# Patient Record
Sex: FEMALE | Race: WHITE | NOT HISPANIC OR LATINO | Employment: OTHER | URBAN - METROPOLITAN AREA
[De-identification: names, ages, dates, MRNs, and addresses within clinical notes are randomized per-mention and may not be internally consistent; named-entity substitution may affect disease eponyms.]

---

## 2017-08-15 ENCOUNTER — APPOINTMENT (OUTPATIENT)
Dept: PREADMISSION TESTING | Facility: HOSPITAL | Age: 57
End: 2017-08-15

## 2017-08-15 ENCOUNTER — HOSPITAL ENCOUNTER (OUTPATIENT)
Dept: GENERAL RADIOLOGY | Facility: HOSPITAL | Age: 57
Discharge: HOME | End: 2017-08-15
Admitting: ORTHOPAEDIC SURGERY

## 2017-08-15 ENCOUNTER — HOSPITAL ENCOUNTER (OUTPATIENT)
Dept: GENERAL RADIOLOGY | Facility: HOSPITAL | Age: 57
Discharge: HOME | End: 2017-08-15

## 2017-08-15 LAB
ABO GROUP BLD: NORMAL
ALBUMIN SERPL-MCNC: 4 G/DL (ref 3.5–5.2)
ALBUMIN/GLOB SERPL: 1.2 G/DL
ALP SERPL-CCNC: 74 U/L (ref 39–117)
ALT SERPL W P-5'-P-CCNC: 18 U/L (ref 1–33)
ANION GAP SERPL CALCULATED.3IONS-SCNC: 14.3 MMOL/L
AST SERPL-CCNC: 18 U/L (ref 1–32)
BILIRUB SERPL-MCNC: 0.2 MG/DL (ref 0.1–1.2)
BILIRUB UR QL STRIP: NEGATIVE
BLD GP AB SCN SERPL QL: NEGATIVE
BUN BLD-MCNC: 16 MG/DL (ref 6–20)
BUN/CREAT SERPL: 23.9 (ref 7–25)
CALCIUM SPEC-SCNC: 9.3 MG/DL (ref 8.6–10.5)
CHLORIDE SERPL-SCNC: 105 MMOL/L (ref 98–107)
CLARITY UR: CLEAR
CO2 SERPL-SCNC: 23.7 MMOL/L (ref 22–29)
COLOR UR: YELLOW
CREAT BLD-MCNC: 0.67 MG/DL (ref 0.57–1)
DEPRECATED RDW RBC AUTO: 49.6 FL (ref 37–54)
ERYTHROCYTE [DISTWIDTH] IN BLOOD BY AUTOMATED COUNT: 13.3 % (ref 11.7–13)
GFR SERPL CREATININE-BSD FRML MDRD: 91 ML/MIN/1.73
GLOBULIN UR ELPH-MCNC: 3.3 GM/DL
GLUCOSE BLD-MCNC: 117 MG/DL (ref 65–99)
GLUCOSE UR STRIP-MCNC: NEGATIVE MG/DL
HCT VFR BLD AUTO: 40.8 % (ref 35.6–45.5)
HGB BLD-MCNC: 12.8 G/DL (ref 11.9–15.5)
HGB UR QL STRIP.AUTO: NEGATIVE
INR PPP: 0.9 (ref 0.9–1.1)
KETONES UR QL STRIP: NEGATIVE
LEUKOCYTE ESTERASE UR QL STRIP.AUTO: NEGATIVE
MCH RBC QN AUTO: 31.6 PG (ref 26.9–32)
MCHC RBC AUTO-ENTMCNC: 31.4 G/DL (ref 32.4–36.3)
MCV RBC AUTO: 100.7 FL (ref 80.5–98.2)
NITRITE UR QL STRIP: NEGATIVE
PH UR STRIP.AUTO: <=5 [PH] (ref 5–8)
PLATELET # BLD AUTO: 287 10*3/MM3 (ref 140–500)
PMV BLD AUTO: 10.4 FL (ref 6–12)
POTASSIUM BLD-SCNC: 3.7 MMOL/L (ref 3.5–5.2)
PROT SERPL-MCNC: 7.3 G/DL (ref 6–8.5)
PROT UR QL STRIP: NEGATIVE
PROTHROMBIN TIME: 11.8 SECONDS (ref 11.7–14.2)
RBC # BLD AUTO: 4.05 10*6/MM3 (ref 3.9–5.2)
RH BLD: POSITIVE
SODIUM BLD-SCNC: 143 MMOL/L (ref 136–145)
SP GR UR STRIP: 1.02 (ref 1–1.03)
UROBILINOGEN UR QL STRIP: NORMAL
WBC NRBC COR # BLD: 7.27 10*3/MM3 (ref 4.5–10.7)

## 2017-08-15 PROCEDURE — 71020 HC CHEST PA AND LATERAL: CPT

## 2017-08-15 PROCEDURE — 86901 BLOOD TYPING SEROLOGIC RH(D): CPT | Performed by: ORTHOPAEDIC SURGERY

## 2017-08-15 PROCEDURE — 73560 X-RAY EXAM OF KNEE 1 OR 2: CPT

## 2017-08-15 PROCEDURE — 86850 RBC ANTIBODY SCREEN: CPT | Performed by: ORTHOPAEDIC SURGERY

## 2017-08-15 PROCEDURE — 81003 URINALYSIS AUTO W/O SCOPE: CPT | Performed by: ORTHOPAEDIC SURGERY

## 2017-08-15 PROCEDURE — 86900 BLOOD TYPING SEROLOGIC ABO: CPT | Performed by: ORTHOPAEDIC SURGERY

## 2017-08-15 PROCEDURE — 85027 COMPLETE CBC AUTOMATED: CPT | Performed by: ORTHOPAEDIC SURGERY

## 2017-08-15 PROCEDURE — 80053 COMPREHEN METABOLIC PANEL: CPT | Performed by: ORTHOPAEDIC SURGERY

## 2017-08-15 PROCEDURE — 85610 PROTHROMBIN TIME: CPT | Performed by: ORTHOPAEDIC SURGERY

## 2017-08-15 PROCEDURE — 93010 ELECTROCARDIOGRAM REPORT: CPT | Performed by: INTERNAL MEDICINE

## 2017-08-15 PROCEDURE — 93005 ELECTROCARDIOGRAM TRACING: CPT

## 2017-08-15 PROCEDURE — 36415 COLL VENOUS BLD VENIPUNCTURE: CPT

## 2017-08-15 RX ORDER — LANOLIN ALCOHOL/MO/W.PET/CERES
1000 CREAM (GRAM) TOPICAL DAILY
COMMUNITY
End: 2021-10-13

## 2017-08-15 RX ORDER — FEXOFENADINE HCL 180 MG/1
180 TABLET ORAL DAILY
COMMUNITY
End: 2019-08-02

## 2017-08-15 RX ORDER — ATORVASTATIN CALCIUM 10 MG/1
10 TABLET, FILM COATED ORAL NIGHTLY
COMMUNITY
End: 2019-09-06 | Stop reason: SDUPTHER

## 2017-08-15 RX ORDER — IBUPROFEN 200 MG
400 TABLET ORAL EVERY 6 HOURS PRN
COMMUNITY
End: 2017-08-25 | Stop reason: HOSPADM

## 2017-08-15 RX ORDER — NAPROXEN SODIUM 220 MG
220 TABLET ORAL 2 TIMES DAILY PRN
COMMUNITY
End: 2017-08-25 | Stop reason: HOSPADM

## 2017-08-15 RX ORDER — TRAMADOL HYDROCHLORIDE 50 MG/1
50 TABLET ORAL AS NEEDED
COMMUNITY
End: 2019-02-01

## 2017-08-15 RX ORDER — ZOLPIDEM TARTRATE 10 MG/1
10 TABLET ORAL NIGHTLY
COMMUNITY
End: 2019-02-01

## 2017-08-15 RX ORDER — MELOXICAM 7.5 MG/1
7.5 TABLET ORAL 2 TIMES DAILY
COMMUNITY
End: 2017-08-25 | Stop reason: HOSPADM

## 2017-08-15 RX ORDER — UREA 40 %
1 CREAM (GRAM) TOPICAL DAILY PRN
COMMUNITY
End: 2020-09-30

## 2017-08-15 RX ORDER — ACETAMINOPHEN, ASPIRIN AND CAFFEINE 250; 250; 65 MG/1; MG/1; MG/1
1 TABLET, FILM COATED ORAL EVERY 6 HOURS PRN
COMMUNITY
End: 2019-02-01

## 2017-08-15 RX ORDER — ESCITALOPRAM OXALATE 10 MG/1
10 TABLET ORAL DAILY
COMMUNITY
End: 2019-09-06 | Stop reason: SDUPTHER

## 2017-08-15 RX ORDER — ESTRADIOL 1 MG/1
1 TABLET ORAL DAILY
COMMUNITY
End: 2019-09-06 | Stop reason: SDUPTHER

## 2017-08-15 NOTE — DISCHARGE INSTRUCTIONS
Take the following medications the morning of surgery with a small sip of water:  NONE    Arrive to hospital on your day of surgery at 7:30 AM.        General Instructions:  • Do not eat solid food after midnight the night before surgery.  • You may drink clear liquids day of surgery but must stop at least one hour before your hospital arrival time 6:30 AM.  • It is beneficial for you to have a clear drink that contains carbohydrates the day of surgery.  We suggest a 20 ounce bottle of Gatorade or Powerade for non-diabetic patients or a 20 ounce bottle of G2 or Powerade Zero for diabetic patients. (Pediatric patients, are not advised to drink a 20 ounce carbohydrate drink)    Clear liquids are liquids you can see through.  Nothing red in color.     Plain water                               Sports drinks  Sodas                                   Gelatin (Jell-O)  Fruit juices without pulp such as white grape juice and apple juice  Popsicles that contain no fruit or yogurt  Tea or coffee (no cream or milk added)  Gatorade / Powerade  G2 / Powerade Zero    • Infants may have breast milk up to four hours before surgery.  • Infants drinking formula may drink formula up to six hours before surgery.   • Patients who avoid smoking, chewing tobacco and alcohol for 4 weeks prior to surgery have a reduced risk of post-operative complications.  Quit smoking as many days before surgery as you can.  • Do not smoke, use chewing tobacco or drink alcohol the day of surgery.   • If applicable bring your C-PAP/ BI-PAP machine.  • Bring any papers given to you in the doctor’s office.  • Wear clean comfortable clothes and socks.  • Do not wear contact lenses or make-up.  Bring a case for your glasses.   • Bring crutches or walker if applicable.  • Leave all other valuables and jewelry at home.  • The Pre-Admission Testing nurse will instruct you to bring medications if unable to obtain an accurate list in Pre-Admission Testing.        If  you were given a blood bank ID arm band remember to bring it with you the day of surgery.    Preventing a Surgical Site Infection:  • For 2 to 3 days before surgery, avoid shaving with a razor because the razor can irritate skin and make it easier to develop an infection.  • The night prior to surgery sleep in a clean bed with clean clothing.  Do not allow pets to sleep with you.  • Shower on the morning of surgery using a fresh bar of anti-bacterial soap (such as Dial) and clean washcloth.  Dry with a clean towel and dress in clean clothing.  • Ask your surgeon if you will be receiving antibiotics prior to surgery.  • Make sure you, your family, and all healthcare providers clean their hands with soap and water or an alcohol based hand  before caring for you or your wound.    Day of surgery:  Upon arrival, a Pre-op nurse and Anesthesiologist will review your health history, obtain vital signs, and answer questions you may have.  The only belongings needed at this time will be your home medications and if applicable your C-PAP/BI-PAP machine.  If you are staying overnight your family can leave the rest of your belongings in the car and bring them to your room later.  A Pre-op nurse will start an IV and you may receive medication in preparation for surgery, including something to help you relax.  Your family will be able to see you in the Pre-op area.  While you are in surgery your family should notify the waiting room  if they leave the waiting room area and provide a contact phone number.    Please be aware that surgery does come with discomfort.  We want to make every effort to control your discomfort so please discuss any uncontrolled symptoms with your nurse.   Your doctor will most likely have prescribed pain medications.      If you are going home after surgery you will receive individualized written care instructions before being discharged.  A responsible adult must drive you to and from  the hospital on the day of your surgery and stay with you for 24 hours.    If you are staying overnight following surgery, you will be transported to your hospital room following the recovery period.  [facility].    If you have any questions please call Pre-Admission Testing at [phone].  Deductibles and co-payments are collected on the day of service. Please be prepared to pay the required co-pay, deductible or deposit on the day of service as defined by your plan.    2% CHLORAHEXIDINE GLUCONATE* CLOTH  Preparing or “prepping” skin before surgery can reduce the risk of infection at the surgical site. To make the process easier, [facility] has chosen disposable cloths moistened with a rinse-free, 2% Chlorhexidine Gluconate (CHG) antiseptic solution. The steps below outline the prepping process and should be carefully followed.        Use the prep cloth on the area that is circled in the diagram             Directions Night before Surgery  1) Shower using a fresh bar of anti-bacterial soap (such as Dial) and clean washcloth.  Use a clean towel to completely dry your skin.  2) Do not use any lotions, oils or creams on your skin.  3) Open the package and remove 1 cloth, wipe your skin for 30 seconds in a circular motion.  Allow to dry for 3 minutes.  4) Repeat #3 with second cloth.  5) Do not touch your eyes, ears, or mouth with the prep cloth.  6) Allow the wet prep solution to air dry.  7) Discard the prep cloth and wash your hands with soap and water.   8) Dress in clean bed clothes and sleep on fresh clean bed sheets.   9) You may experience some temporary itching after the prep.    Directions Day of Surgery  1) Repeat steps 1,2,3,4,5,6,7, and 9.   2) Dress in clean clothes before coming to the hospital.    BACTROBAN NASAL OINTMENT  There are many germs normally in your nose. Bactroban is an ointment that will help reduce these germs. Please follow these  instructions for Bactroban use:      ____The day before surgery in the morning  Date________    ____The day before surgery in the evening              Date________    ____The day of surgery in the morning    Date________    **Squirt ½ package of Bactroban Ointment onto a cotton applicator and apply to inside of 1st nostril.  Squirt the remaining Bactroban and apply to the inside of the other nostril.

## 2017-08-22 ENCOUNTER — HOSPITAL ENCOUNTER (INPATIENT)
Facility: HOSPITAL | Age: 57
LOS: 3 days | Discharge: HOME HEALTH | End: 2017-08-25
Attending: ORTHOPAEDIC SURGERY | Admitting: ORTHOPAEDIC SURGERY

## 2017-08-22 ENCOUNTER — APPOINTMENT (OUTPATIENT)
Dept: GENERAL RADIOLOGY | Facility: HOSPITAL | Age: 57
End: 2017-08-22

## 2017-08-22 ENCOUNTER — ANESTHESIA EVENT (OUTPATIENT)
Dept: PERIOP | Facility: HOSPITAL | Age: 57
End: 2017-08-22

## 2017-08-22 ENCOUNTER — ANESTHESIA (OUTPATIENT)
Dept: PERIOP | Facility: HOSPITAL | Age: 57
End: 2017-08-22

## 2017-08-22 DIAGNOSIS — R26.2 DIFFICULTY WALKING: Primary | ICD-10-CM

## 2017-08-22 PROBLEM — F41.9 ANXIETY: Status: ACTIVE | Noted: 2017-08-22

## 2017-08-22 PROBLEM — M17.9 OA (OSTEOARTHRITIS) OF KNEE: Status: ACTIVE | Noted: 2017-08-22

## 2017-08-22 PROBLEM — D72.829 LEUKOCYTOSIS: Status: ACTIVE | Noted: 2017-08-22

## 2017-08-22 PROBLEM — R06.00 DYSPNEA: Status: ACTIVE | Noted: 2017-08-22

## 2017-08-22 LAB
ANION GAP SERPL CALCULATED.3IONS-SCNC: 19.7 MMOL/L
BASOPHILS # BLD AUTO: 0.01 10*3/MM3 (ref 0–0.2)
BASOPHILS NFR BLD AUTO: 0.1 % (ref 0–1.5)
BUN BLD-MCNC: 13 MG/DL (ref 6–20)
BUN/CREAT SERPL: 16.7 (ref 7–25)
CALCIUM SPEC-SCNC: 8.6 MG/DL (ref 8.6–10.5)
CHLORIDE SERPL-SCNC: 99 MMOL/L (ref 98–107)
CO2 SERPL-SCNC: 19.3 MMOL/L (ref 22–29)
CREAT BLD-MCNC: 0.78 MG/DL (ref 0.57–1)
DEPRECATED RDW RBC AUTO: 49.8 FL (ref 37–54)
EOSINOPHIL # BLD AUTO: 0 10*3/MM3 (ref 0–0.7)
EOSINOPHIL NFR BLD AUTO: 0 % (ref 0.3–6.2)
ERYTHROCYTE [DISTWIDTH] IN BLOOD BY AUTOMATED COUNT: 13.5 % (ref 11.7–13)
GFR SERPL CREATININE-BSD FRML MDRD: 76 ML/MIN/1.73
GLUCOSE BLD-MCNC: 168 MG/DL (ref 65–99)
GLUCOSE BLDC GLUCOMTR-MCNC: 159 MG/DL (ref 70–130)
HCT VFR BLD AUTO: 37.5 % (ref 35.6–45.5)
HGB BLD-MCNC: 11.9 G/DL (ref 11.9–15.5)
IMM GRANULOCYTES # BLD: 0.07 10*3/MM3 (ref 0–0.03)
IMM GRANULOCYTES NFR BLD: 0.4 % (ref 0–0.5)
LYMPHOCYTES # BLD AUTO: 0.65 10*3/MM3 (ref 0.9–4.8)
LYMPHOCYTES NFR BLD AUTO: 3.5 % (ref 19.6–45.3)
MCH RBC QN AUTO: 32 PG (ref 26.9–32)
MCHC RBC AUTO-ENTMCNC: 31.7 G/DL (ref 32.4–36.3)
MCV RBC AUTO: 100.8 FL (ref 80.5–98.2)
MONOCYTES # BLD AUTO: 0.31 10*3/MM3 (ref 0.2–1.2)
MONOCYTES NFR BLD AUTO: 1.7 % (ref 5–12)
NEUTROPHILS # BLD AUTO: 17.35 10*3/MM3 (ref 1.9–8.1)
NEUTROPHILS NFR BLD AUTO: 94.3 % (ref 42.7–76)
PLATELET # BLD AUTO: 264 10*3/MM3 (ref 140–500)
PMV BLD AUTO: 10.3 FL (ref 6–12)
POTASSIUM BLD-SCNC: 4.2 MMOL/L (ref 3.5–5.2)
RBC # BLD AUTO: 3.72 10*6/MM3 (ref 3.9–5.2)
SODIUM BLD-SCNC: 138 MMOL/L (ref 136–145)
WBC NRBC COR # BLD: 18.39 10*3/MM3 (ref 4.5–10.7)

## 2017-08-22 PROCEDURE — 25010000002 PROPOFOL 10 MG/ML EMULSION: Performed by: NURSE ANESTHETIST, CERTIFIED REGISTERED

## 2017-08-22 PROCEDURE — 25010000002 MORPHINE (PF) 10 MG/ML SOLUTION 1 ML VIAL: Performed by: ORTHOPAEDIC SURGERY

## 2017-08-22 PROCEDURE — 25010000003 CEFAZOLIN IN DEXTROSE 2-4 GM/100ML-% SOLUTION: Performed by: ORTHOPAEDIC SURGERY

## 2017-08-22 PROCEDURE — 25010000002 MIDAZOLAM PER 1 MG: Performed by: ANESTHESIOLOGY

## 2017-08-22 PROCEDURE — 25010000002 EPINEPHRINE PER 0.1 MG: Performed by: ORTHOPAEDIC SURGERY

## 2017-08-22 PROCEDURE — 25010000002 DEXAMETHASONE PER 1 MG: Performed by: NURSE ANESTHETIST, CERTIFIED REGISTERED

## 2017-08-22 PROCEDURE — 82962 GLUCOSE BLOOD TEST: CPT

## 2017-08-22 PROCEDURE — 25010000002 ROPIVACAINE PER 1 MG: Performed by: ANESTHESIOLOGY

## 2017-08-22 PROCEDURE — C1776 JOINT DEVICE (IMPLANTABLE): HCPCS | Performed by: ORTHOPAEDIC SURGERY

## 2017-08-22 PROCEDURE — 25010000002 FENTANYL CITRATE (PF) 100 MCG/2ML SOLUTION: Performed by: ANESTHESIOLOGY

## 2017-08-22 PROCEDURE — 97162 PT EVAL MOD COMPLEX 30 MIN: CPT

## 2017-08-22 PROCEDURE — C1713 ANCHOR/SCREW BN/BN,TIS/BN: HCPCS | Performed by: ORTHOPAEDIC SURGERY

## 2017-08-22 PROCEDURE — 25010000002 KETOROLAC TROMETHAMINE PER 15 MG: Performed by: ORTHOPAEDIC SURGERY

## 2017-08-22 PROCEDURE — 97110 THERAPEUTIC EXERCISES: CPT

## 2017-08-22 PROCEDURE — 85025 COMPLETE CBC W/AUTO DIFF WBC: CPT | Performed by: HOSPITALIST

## 2017-08-22 PROCEDURE — 25010000002 MORPHINE SULFATE (PF) 2 MG/ML SOLUTION: Performed by: ORTHOPAEDIC SURGERY

## 2017-08-22 PROCEDURE — 25010000002 ROPIVACAINE PER 1 MG: Performed by: ORTHOPAEDIC SURGERY

## 2017-08-22 PROCEDURE — 25010000002 FENTANYL CITRATE (PF) 100 MCG/2ML SOLUTION: Performed by: NURSE ANESTHETIST, CERTIFIED REGISTERED

## 2017-08-22 PROCEDURE — 25010000002 ONDANSETRON PER 1 MG: Performed by: NURSE ANESTHETIST, CERTIFIED REGISTERED

## 2017-08-22 PROCEDURE — 0SRD0J9 REPLACEMENT OF LEFT KNEE JOINT WITH SYNTHETIC SUBSTITUTE, CEMENTED, OPEN APPROACH: ICD-10-PCS | Performed by: ORTHOPAEDIC SURGERY

## 2017-08-22 PROCEDURE — 25010000002 ONDANSETRON PER 1 MG: Performed by: ORTHOPAEDIC SURGERY

## 2017-08-22 PROCEDURE — 25010000002 HYDROMORPHONE PER 4 MG: Performed by: NURSE ANESTHETIST, CERTIFIED REGISTERED

## 2017-08-22 PROCEDURE — 80048 BASIC METABOLIC PNL TOTAL CA: CPT | Performed by: HOSPITALIST

## 2017-08-22 PROCEDURE — 73560 X-RAY EXAM OF KNEE 1 OR 2: CPT

## 2017-08-22 DEVICE — CMT BONE PALACOS 120001: Type: IMPLANTABLE DEVICE | Site: KNEE | Status: FUNCTIONAL

## 2017-08-22 DEVICE — COMP FEM/KN VANGUARD INTLK CR 62.5MM NS LT: Type: IMPLANTABLE DEVICE | Site: KNEE | Status: FUNCTIONAL

## 2017-08-22 DEVICE — IMPLANTABLE DEVICE: Type: IMPLANTABLE DEVICE | Site: KNEE | Status: FUNCTIONAL

## 2017-08-22 DEVICE — TRY TIB INTERLK PRI 67MM: Type: IMPLANTABLE DEVICE | Site: KNEE | Status: FUNCTIONAL

## 2017-08-22 DEVICE — STEM TIB PRI FINN 46X40MM: Type: IMPLANTABLE DEVICE | Site: KNEE | Status: FUNCTIONAL

## 2017-08-22 DEVICE — PAT 3PEG THN 31X6.2  31MM: Type: IMPLANTABLE DEVICE | Site: KNEE | Status: FUNCTIONAL

## 2017-08-22 DEVICE — BEAR TIB/KN VANGUARD AS 10X67MM NS: Type: IMPLANTABLE DEVICE | Site: KNEE | Status: FUNCTIONAL

## 2017-08-22 RX ORDER — HYDROMORPHONE HCL 110MG/55ML
PATIENT CONTROLLED ANALGESIA SYRINGE INTRAVENOUS AS NEEDED
Status: DISCONTINUED | OUTPATIENT
Start: 2017-08-22 | End: 2017-08-22 | Stop reason: SURG

## 2017-08-22 RX ORDER — SODIUM CHLORIDE 0.9 % (FLUSH) 0.9 %
1-10 SYRINGE (ML) INJECTION AS NEEDED
Status: DISCONTINUED | OUTPATIENT
Start: 2017-08-22 | End: 2017-08-22 | Stop reason: HOSPADM

## 2017-08-22 RX ORDER — PROPOFOL 10 MG/ML
VIAL (ML) INTRAVENOUS AS NEEDED
Status: DISCONTINUED | OUTPATIENT
Start: 2017-08-22 | End: 2017-08-22 | Stop reason: SURG

## 2017-08-22 RX ORDER — BISACODYL 10 MG
10 SUPPOSITORY, RECTAL RECTAL DAILY PRN
Status: DISCONTINUED | OUTPATIENT
Start: 2017-08-22 | End: 2017-08-25 | Stop reason: HOSPADM

## 2017-08-22 RX ORDER — ACETAMINOPHEN, ASPIRIN AND CAFFEINE 250; 250; 65 MG/1; MG/1; MG/1
1 TABLET, FILM COATED ORAL EVERY 6 HOURS PRN
Status: DISCONTINUED | OUTPATIENT
Start: 2017-08-22 | End: 2017-08-25 | Stop reason: HOSPADM

## 2017-08-22 RX ORDER — MIDAZOLAM HYDROCHLORIDE 1 MG/ML
1 INJECTION INTRAMUSCULAR; INTRAVENOUS
Status: DISCONTINUED | OUTPATIENT
Start: 2017-08-22 | End: 2017-08-22 | Stop reason: HOSPADM

## 2017-08-22 RX ORDER — DOCUSATE SODIUM 100 MG/1
100 CAPSULE, LIQUID FILLED ORAL 2 TIMES DAILY PRN
Status: DISCONTINUED | OUTPATIENT
Start: 2017-08-22 | End: 2017-08-25 | Stop reason: HOSPADM

## 2017-08-22 RX ORDER — CHOLECALCIFEROL (VITAMIN D3) 125 MCG
1000 CAPSULE ORAL DAILY
Status: DISCONTINUED | OUTPATIENT
Start: 2017-08-22 | End: 2017-08-25 | Stop reason: HOSPADM

## 2017-08-22 RX ORDER — FENTANYL CITRATE 50 UG/ML
50 INJECTION, SOLUTION INTRAMUSCULAR; INTRAVENOUS
Status: DISCONTINUED | OUTPATIENT
Start: 2017-08-22 | End: 2017-08-22 | Stop reason: HOSPADM

## 2017-08-22 RX ORDER — EPHEDRINE SULFATE 50 MG/ML
5 INJECTION, SOLUTION INTRAVENOUS ONCE AS NEEDED
Status: DISCONTINUED | OUTPATIENT
Start: 2017-08-22 | End: 2017-08-22 | Stop reason: HOSPADM

## 2017-08-22 RX ORDER — KETOROLAC TROMETHAMINE 30 MG/ML
15 INJECTION, SOLUTION INTRAMUSCULAR; INTRAVENOUS EVERY 8 HOURS PRN
Status: DISPENSED | OUTPATIENT
Start: 2017-08-22 | End: 2017-08-24

## 2017-08-22 RX ORDER — HYDRALAZINE HYDROCHLORIDE 20 MG/ML
5 INJECTION INTRAMUSCULAR; INTRAVENOUS
Status: DISCONTINUED | OUTPATIENT
Start: 2017-08-22 | End: 2017-08-22 | Stop reason: HOSPADM

## 2017-08-22 RX ORDER — FERROUS SULFATE 325(65) MG
325 TABLET ORAL
Status: DISCONTINUED | OUTPATIENT
Start: 2017-08-23 | End: 2017-08-25 | Stop reason: HOSPADM

## 2017-08-22 RX ORDER — FLUMAZENIL 0.1 MG/ML
0.2 INJECTION INTRAVENOUS AS NEEDED
Status: DISCONTINUED | OUTPATIENT
Start: 2017-08-22 | End: 2017-08-22 | Stop reason: HOSPADM

## 2017-08-22 RX ORDER — MIDAZOLAM HYDROCHLORIDE 1 MG/ML
2 INJECTION INTRAMUSCULAR; INTRAVENOUS
Status: DISCONTINUED | OUTPATIENT
Start: 2017-08-22 | End: 2017-08-22 | Stop reason: HOSPADM

## 2017-08-22 RX ORDER — PROMETHAZINE HYDROCHLORIDE 25 MG/ML
12.5 INJECTION, SOLUTION INTRAMUSCULAR; INTRAVENOUS ONCE AS NEEDED
Status: DISCONTINUED | OUTPATIENT
Start: 2017-08-22 | End: 2017-08-22 | Stop reason: HOSPADM

## 2017-08-22 RX ORDER — HYDROMORPHONE HYDROCHLORIDE 1 MG/ML
0.5 INJECTION, SOLUTION INTRAMUSCULAR; INTRAVENOUS; SUBCUTANEOUS
Status: DISCONTINUED | OUTPATIENT
Start: 2017-08-22 | End: 2017-08-22 | Stop reason: HOSPADM

## 2017-08-22 RX ORDER — HYDROCODONE BITARTRATE AND ACETAMINOPHEN 7.5; 325 MG/1; MG/1
1 TABLET ORAL ONCE AS NEEDED
Status: DISCONTINUED | OUTPATIENT
Start: 2017-08-22 | End: 2017-08-22 | Stop reason: HOSPADM

## 2017-08-22 RX ORDER — DIPHENHYDRAMINE HYDROCHLORIDE 50 MG/ML
12.5 INJECTION INTRAMUSCULAR; INTRAVENOUS
Status: DISCONTINUED | OUTPATIENT
Start: 2017-08-22 | End: 2017-08-22 | Stop reason: HOSPADM

## 2017-08-22 RX ORDER — ZOLPIDEM TARTRATE 5 MG/1
10 TABLET ORAL NIGHTLY
Status: DISCONTINUED | OUTPATIENT
Start: 2017-08-22 | End: 2017-08-25 | Stop reason: HOSPADM

## 2017-08-22 RX ORDER — ONDANSETRON 4 MG/1
4 TABLET, FILM COATED ORAL EVERY 6 HOURS PRN
Status: DISCONTINUED | OUTPATIENT
Start: 2017-08-22 | End: 2017-08-25 | Stop reason: HOSPADM

## 2017-08-22 RX ORDER — TRANEXAMIC ACID 100 MG/ML
INJECTION, SOLUTION INTRAVENOUS AS NEEDED
Status: DISCONTINUED | OUTPATIENT
Start: 2017-08-22 | End: 2017-08-22 | Stop reason: SURG

## 2017-08-22 RX ORDER — ONDANSETRON 2 MG/ML
INJECTION INTRAMUSCULAR; INTRAVENOUS AS NEEDED
Status: DISCONTINUED | OUTPATIENT
Start: 2017-08-22 | End: 2017-08-22 | Stop reason: SURG

## 2017-08-22 RX ORDER — SENNA AND DOCUSATE SODIUM 50; 8.6 MG/1; MG/1
2 TABLET, FILM COATED ORAL 2 TIMES DAILY
Status: DISCONTINUED | OUTPATIENT
Start: 2017-08-22 | End: 2017-08-25 | Stop reason: HOSPADM

## 2017-08-22 RX ORDER — CETIRIZINE HYDROCHLORIDE 10 MG/1
10 TABLET ORAL DAILY
Status: DISCONTINUED | OUTPATIENT
Start: 2017-08-22 | End: 2017-08-25 | Stop reason: HOSPADM

## 2017-08-22 RX ORDER — TRAMADOL HYDROCHLORIDE 50 MG/1
50 TABLET ORAL AS NEEDED
Status: DISCONTINUED | OUTPATIENT
Start: 2017-08-22 | End: 2017-08-25 | Stop reason: HOSPADM

## 2017-08-22 RX ORDER — PROMETHAZINE HYDROCHLORIDE 25 MG/1
25 TABLET ORAL ONCE AS NEEDED
Status: DISCONTINUED | OUTPATIENT
Start: 2017-08-22 | End: 2017-08-22 | Stop reason: HOSPADM

## 2017-08-22 RX ORDER — FAMOTIDINE 10 MG/ML
20 INJECTION, SOLUTION INTRAVENOUS ONCE
Status: COMPLETED | OUTPATIENT
Start: 2017-08-22 | End: 2017-08-22

## 2017-08-22 RX ORDER — CEFAZOLIN SODIUM 2 G/100ML
2 INJECTION, SOLUTION INTRAVENOUS ONCE
Status: COMPLETED | OUTPATIENT
Start: 2017-08-22 | End: 2017-08-22

## 2017-08-22 RX ORDER — ONDANSETRON 2 MG/ML
4 INJECTION INTRAMUSCULAR; INTRAVENOUS EVERY 6 HOURS PRN
Status: DISCONTINUED | OUTPATIENT
Start: 2017-08-22 | End: 2017-08-25 | Stop reason: HOSPADM

## 2017-08-22 RX ORDER — OXYCODONE HYDROCHLORIDE AND ACETAMINOPHEN 5; 325 MG/1; MG/1
2 TABLET ORAL EVERY 4 HOURS PRN
Status: DISCONTINUED | OUTPATIENT
Start: 2017-08-22 | End: 2017-08-23

## 2017-08-22 RX ORDER — EPHEDRINE SULFATE 50 MG/ML
INJECTION, SOLUTION INTRAVENOUS AS NEEDED
Status: DISCONTINUED | OUTPATIENT
Start: 2017-08-22 | End: 2017-08-22 | Stop reason: SURG

## 2017-08-22 RX ORDER — ASPIRIN 325 MG
325 TABLET, DELAYED RELEASE (ENTERIC COATED) ORAL 2 TIMES DAILY WITH MEALS
Status: DISCONTINUED | OUTPATIENT
Start: 2017-08-22 | End: 2017-08-25 | Stop reason: HOSPADM

## 2017-08-22 RX ORDER — DIAZEPAM 5 MG/1
5 TABLET ORAL 2 TIMES DAILY PRN
Status: DISCONTINUED | OUTPATIENT
Start: 2017-08-22 | End: 2017-08-25 | Stop reason: HOSPADM

## 2017-08-22 RX ORDER — ONDANSETRON 2 MG/ML
4 INJECTION INTRAMUSCULAR; INTRAVENOUS ONCE AS NEEDED
Status: DISCONTINUED | OUTPATIENT
Start: 2017-08-22 | End: 2017-08-22 | Stop reason: HOSPADM

## 2017-08-22 RX ORDER — ACETAMINOPHEN 325 MG/1
325 TABLET ORAL EVERY 4 HOURS PRN
Status: DISCONTINUED | OUTPATIENT
Start: 2017-08-22 | End: 2017-08-25 | Stop reason: HOSPADM

## 2017-08-22 RX ORDER — ATORVASTATIN CALCIUM 10 MG/1
10 TABLET, FILM COATED ORAL NIGHTLY
Status: DISCONTINUED | OUTPATIENT
Start: 2017-08-22 | End: 2017-08-25 | Stop reason: HOSPADM

## 2017-08-22 RX ORDER — ACETAMINOPHEN 500 MG
1000 TABLET ORAL ONCE
Status: DISCONTINUED | OUTPATIENT
Start: 2017-08-22 | End: 2017-08-22 | Stop reason: HOSPADM

## 2017-08-22 RX ORDER — UREA 10 %
1 LOTION (ML) TOPICAL NIGHTLY PRN
Status: DISCONTINUED | OUTPATIENT
Start: 2017-08-22 | End: 2017-08-25 | Stop reason: HOSPADM

## 2017-08-22 RX ORDER — NALOXONE HCL 0.4 MG/ML
0.2 VIAL (ML) INJECTION AS NEEDED
Status: DISCONTINUED | OUTPATIENT
Start: 2017-08-22 | End: 2017-08-22 | Stop reason: HOSPADM

## 2017-08-22 RX ORDER — LABETALOL HYDROCHLORIDE 5 MG/ML
5 INJECTION, SOLUTION INTRAVENOUS
Status: DISCONTINUED | OUTPATIENT
Start: 2017-08-22 | End: 2017-08-22 | Stop reason: HOSPADM

## 2017-08-22 RX ORDER — SODIUM CHLORIDE 0.9 % (FLUSH) 0.9 %
1-10 SYRINGE (ML) INJECTION AS NEEDED
Status: DISCONTINUED | OUTPATIENT
Start: 2017-08-22 | End: 2017-08-25 | Stop reason: HOSPADM

## 2017-08-22 RX ORDER — DIPHENHYDRAMINE HCL 25 MG
50 CAPSULE ORAL EVERY 6 HOURS PRN
Status: DISCONTINUED | OUTPATIENT
Start: 2017-08-22 | End: 2017-08-25 | Stop reason: HOSPADM

## 2017-08-22 RX ORDER — CHLORHEXIDINE GLUCONATE 4 G/100ML
SOLUTION TOPICAL ONCE
COMMUNITY
End: 2017-08-25 | Stop reason: HOSPADM

## 2017-08-22 RX ORDER — LIDOCAINE HYDROCHLORIDE 20 MG/ML
INJECTION, SOLUTION INFILTRATION; PERINEURAL AS NEEDED
Status: DISCONTINUED | OUTPATIENT
Start: 2017-08-22 | End: 2017-08-22 | Stop reason: SURG

## 2017-08-22 RX ORDER — ACETAMINOPHEN 325 MG/1
650 TABLET ORAL EVERY 4 HOURS PRN
Status: DISCONTINUED | OUTPATIENT
Start: 2017-08-22 | End: 2017-08-25 | Stop reason: HOSPADM

## 2017-08-22 RX ORDER — ESCITALOPRAM OXALATE 10 MG/1
10 TABLET ORAL DAILY
Status: DISCONTINUED | OUTPATIENT
Start: 2017-08-22 | End: 2017-08-25 | Stop reason: HOSPADM

## 2017-08-22 RX ORDER — SODIUM CHLORIDE, SODIUM LACTATE, POTASSIUM CHLORIDE, CALCIUM CHLORIDE 600; 310; 30; 20 MG/100ML; MG/100ML; MG/100ML; MG/100ML
9 INJECTION, SOLUTION INTRAVENOUS CONTINUOUS
Status: DISCONTINUED | OUTPATIENT
Start: 2017-08-22 | End: 2017-08-25 | Stop reason: HOSPADM

## 2017-08-22 RX ORDER — DEXAMETHASONE SODIUM PHOSPHATE 10 MG/ML
INJECTION INTRAMUSCULAR; INTRAVENOUS AS NEEDED
Status: DISCONTINUED | OUTPATIENT
Start: 2017-08-22 | End: 2017-08-22 | Stop reason: SURG

## 2017-08-22 RX ORDER — ONDANSETRON 4 MG/1
4 TABLET, ORALLY DISINTEGRATING ORAL EVERY 6 HOURS PRN
Status: DISCONTINUED | OUTPATIENT
Start: 2017-08-22 | End: 2017-08-25 | Stop reason: HOSPADM

## 2017-08-22 RX ORDER — ROPIVACAINE HYDROCHLORIDE 5 MG/ML
INJECTION, SOLUTION EPIDURAL; INFILTRATION; PERINEURAL AS NEEDED
Status: DISCONTINUED | OUTPATIENT
Start: 2017-08-22 | End: 2017-08-22 | Stop reason: SURG

## 2017-08-22 RX ORDER — SODIUM CHLORIDE 450 MG/100ML
100 INJECTION, SOLUTION INTRAVENOUS CONTINUOUS
Status: DISCONTINUED | OUTPATIENT
Start: 2017-08-22 | End: 2017-08-23

## 2017-08-22 RX ORDER — ESTRADIOL 1 MG/1
1 TABLET ORAL DAILY
Status: DISCONTINUED | OUTPATIENT
Start: 2017-08-22 | End: 2017-08-25 | Stop reason: HOSPADM

## 2017-08-22 RX ORDER — OXYCODONE HYDROCHLORIDE AND ACETAMINOPHEN 5; 325 MG/1; MG/1
1 TABLET ORAL EVERY 4 HOURS PRN
Status: DISCONTINUED | OUTPATIENT
Start: 2017-08-22 | End: 2017-08-23

## 2017-08-22 RX ORDER — CLINDAMYCIN PHOSPHATE 900 MG/50ML
900 INJECTION INTRAVENOUS EVERY 8 HOURS
Status: COMPLETED | OUTPATIENT
Start: 2017-08-22 | End: 2017-08-22

## 2017-08-22 RX ORDER — OXYCODONE AND ACETAMINOPHEN 7.5; 325 MG/1; MG/1
1 TABLET ORAL ONCE AS NEEDED
Status: DISCONTINUED | OUTPATIENT
Start: 2017-08-22 | End: 2017-08-22 | Stop reason: HOSPADM

## 2017-08-22 RX ORDER — NALOXONE HCL 0.4 MG/ML
0.4 VIAL (ML) INJECTION
Status: DISCONTINUED | OUTPATIENT
Start: 2017-08-22 | End: 2017-08-25 | Stop reason: HOSPADM

## 2017-08-22 RX ORDER — PROMETHAZINE HYDROCHLORIDE 25 MG/1
25 SUPPOSITORY RECTAL ONCE AS NEEDED
Status: DISCONTINUED | OUTPATIENT
Start: 2017-08-22 | End: 2017-08-22 | Stop reason: HOSPADM

## 2017-08-22 RX ORDER — MORPHINE SULFATE 2 MG/ML
6 INJECTION, SOLUTION INTRAMUSCULAR; INTRAVENOUS
Status: DISCONTINUED | OUTPATIENT
Start: 2017-08-22 | End: 2017-08-25 | Stop reason: HOSPADM

## 2017-08-22 RX ORDER — PROMETHAZINE HYDROCHLORIDE 25 MG/1
12.5 TABLET ORAL ONCE AS NEEDED
Status: DISCONTINUED | OUTPATIENT
Start: 2017-08-22 | End: 2017-08-22 | Stop reason: HOSPADM

## 2017-08-22 RX ORDER — ALBUTEROL SULFATE 2.5 MG/3ML
2.5 SOLUTION RESPIRATORY (INHALATION) ONCE
Status: COMPLETED | OUTPATIENT
Start: 2017-08-22 | End: 2017-08-22

## 2017-08-22 RX ORDER — DIAZEPAM 5 MG/ML
5 INJECTION, SOLUTION INTRAMUSCULAR; INTRAVENOUS 2 TIMES DAILY PRN
Status: ACTIVE | OUTPATIENT
Start: 2017-08-22 | End: 2017-08-23

## 2017-08-22 RX ORDER — ALBUTEROL SULFATE 2.5 MG/3ML
SOLUTION RESPIRATORY (INHALATION)
Status: COMPLETED
Start: 2017-08-22 | End: 2017-08-22

## 2017-08-22 RX ADMIN — OXYCODONE HYDROCHLORIDE AND ACETAMINOPHEN 1 TABLET: 5; 325 TABLET ORAL at 20:59

## 2017-08-22 RX ADMIN — SODIUM CHLORIDE, POTASSIUM CHLORIDE, SODIUM LACTATE AND CALCIUM CHLORIDE: 600; 310; 30; 20 INJECTION, SOLUTION INTRAVENOUS at 10:11

## 2017-08-22 RX ADMIN — ONDANSETRON 4 MG: 2 INJECTION INTRAMUSCULAR; INTRAVENOUS at 18:00

## 2017-08-22 RX ADMIN — ZOLPIDEM TARTRATE 10 MG: 5 TABLET, FILM COATED ORAL at 20:59

## 2017-08-22 RX ADMIN — SODIUM CHLORIDE 100 ML/HR: 4.5 INJECTION, SOLUTION INTRAVENOUS at 14:10

## 2017-08-22 RX ADMIN — ALBUTEROL SULFATE 2.5 MG: 2.5 SOLUTION RESPIRATORY (INHALATION) at 18:09

## 2017-08-22 RX ADMIN — SODIUM CHLORIDE, POTASSIUM CHLORIDE, SODIUM LACTATE AND CALCIUM CHLORIDE: 600; 310; 30; 20 INJECTION, SOLUTION INTRAVENOUS at 11:30

## 2017-08-22 RX ADMIN — FAMOTIDINE 20 MG: 10 INJECTION, SOLUTION INTRAVENOUS at 09:01

## 2017-08-22 RX ADMIN — ONDANSETRON 4 MG: 2 INJECTION INTRAMUSCULAR; INTRAVENOUS at 11:47

## 2017-08-22 RX ADMIN — KETOROLAC TROMETHAMINE 15 MG: 30 INJECTION, SOLUTION INTRAMUSCULAR; INTRAVENOUS at 16:11

## 2017-08-22 RX ADMIN — FENTANYL CITRATE 50 MCG: 50 INJECTION INTRAMUSCULAR; INTRAVENOUS at 08:52

## 2017-08-22 RX ADMIN — HYDROMORPHONE HYDROCHLORIDE 1 MG: 2 INJECTION, SOLUTION INTRAMUSCULAR; INTRAVENOUS; SUBCUTANEOUS at 10:34

## 2017-08-22 RX ADMIN — FENTANYL CITRATE 25 MCG: 50 INJECTION INTRAMUSCULAR; INTRAVENOUS at 10:56

## 2017-08-22 RX ADMIN — ESCITALOPRAM 10 MG: 10 TABLET, FILM COATED ORAL at 21:00

## 2017-08-22 RX ADMIN — OXYCODONE HYDROCHLORIDE AND ACETAMINOPHEN 2 TABLET: 5; 325 TABLET ORAL at 16:11

## 2017-08-22 RX ADMIN — CLINDAMYCIN PHOSPHATE 900 MG: 18 INJECTION, SOLUTION INTRAMUSCULAR; INTRAVENOUS at 21:37

## 2017-08-22 RX ADMIN — ROPIVACAINE HYDROCHLORIDE 30 ML: 5 INJECTION, SOLUTION EPIDURAL; INFILTRATION; PERINEURAL at 08:59

## 2017-08-22 RX ADMIN — DEXAMETHASONE SODIUM PHOSPHATE 8 MG: 10 INJECTION INTRAMUSCULAR; INTRAVENOUS at 10:19

## 2017-08-22 RX ADMIN — EPHEDRINE SULFATE 10 MG: 50 INJECTION INTRAMUSCULAR; INTRAVENOUS; SUBCUTANEOUS at 10:34

## 2017-08-22 RX ADMIN — CLINDAMYCIN PHOSPHATE 900 MG: 18 INJECTION, SOLUTION INTRAMUSCULAR; INTRAVENOUS at 15:23

## 2017-08-22 RX ADMIN — LIDOCAINE HYDROCHLORIDE 60 MG: 20 INJECTION, SOLUTION INFILTRATION; PERINEURAL at 10:14

## 2017-08-22 RX ADMIN — FENTANYL CITRATE 50 MCG: 50 INJECTION INTRAMUSCULAR; INTRAVENOUS at 12:34

## 2017-08-22 RX ADMIN — MUPIROCIN: 20 OINTMENT TOPICAL at 21:25

## 2017-08-22 RX ADMIN — FENTANYL CITRATE 25 MCG: 50 INJECTION INTRAMUSCULAR; INTRAVENOUS at 11:04

## 2017-08-22 RX ADMIN — CEFAZOLIN SODIUM 2 G: 2 INJECTION, SOLUTION INTRAVENOUS at 11:48

## 2017-08-22 RX ADMIN — MORPHINE SULFATE 6 MG: 2 INJECTION, SOLUTION INTRAMUSCULAR; INTRAVENOUS at 14:10

## 2017-08-22 RX ADMIN — MIDAZOLAM 2 MG: 1 INJECTION INTRAMUSCULAR; INTRAVENOUS at 08:52

## 2017-08-22 RX ADMIN — DOCUSATE SODIUM 100 MG: 100 CAPSULE, LIQUID FILLED ORAL at 16:11

## 2017-08-22 RX ADMIN — ATORVASTATIN CALCIUM 10 MG: 10 TABLET, FILM COATED ORAL at 21:01

## 2017-08-22 RX ADMIN — CEFAZOLIN SODIUM 2 G: 2 INJECTION, SOLUTION INTRAVENOUS at 10:15

## 2017-08-22 RX ADMIN — TRANEXAMIC ACID 1000 MG: 100 INJECTION, SOLUTION INTRAVENOUS at 11:24

## 2017-08-22 RX ADMIN — FENTANYL CITRATE 50 MCG: 50 INJECTION INTRAMUSCULAR; INTRAVENOUS at 10:44

## 2017-08-22 RX ADMIN — ASPIRIN 325 MG: 325 TABLET, DELAYED RELEASE ORAL at 20:59

## 2017-08-22 RX ADMIN — PROPOFOL 200 MG: 10 INJECTION, EMULSION INTRAVENOUS at 10:14

## 2017-08-22 RX ADMIN — ESTRADIOL 1 MG: 1 TABLET ORAL at 21:00

## 2017-08-22 NOTE — NURSING NOTE
"Patient called staff into room at approximately 1800 c/o SOA and difficulty breathing. This RN walked into room, patient up to chair with visible increased work of breathing. o2 saturation 100% on room air. 2 liters of o2 per NC applied. BP stable (see flowsheet). RRT called. Attempted to call Dr. Priest. Unable to reach- message left on voicemail. RRT responded to call and breathing treatment given by RT. Patient stable and voices \"feeling better\" after treatment. VSS post treatment. RRT and this RN left patient stable in chair. Approximately 15-20 minutes later patient called out for staff again c/o SOA and difficulty breathing. Patient asking for another breathing treatment. This RN educated patient on time frame for breathing treatment and it is too close to the previous dose. Patient also c/o of feeling \"shaky\" and \"nervous.\" This RN attempted to explain that those are side effect of the Albuterol treatment. o2 saturation % on 2 liters at this time. Another rapid response called. Patient was escorted back to bed with the assistance of RN and RRT. This RN spoke with Dr. Blood (on-call for Dr. Priest) who ordered PRN breathing treatments and STAT [facility] consult. This RN called in consult to [facility] office. RRT left patient stable in bed. This RN spoke with Dr. Priest who said to continue control treatment and see what [facility] has to say. This RN spoke with Dr. Soares with A and let him know what was going on and that the patient is currently stable. He stated he will see shortly.   "

## 2017-08-22 NOTE — CODE DOCUMENTATION
Consult placed to A, pt assisted back to bed. No acute distress noted. Pt complains of nausea. Prn med not avail at this time.

## 2017-08-22 NOTE — ANESTHESIA PREPROCEDURE EVALUATION
Anesthesia Evaluation     Nursing notes reviewed   history of anesthetic complications:  NPO Solid Status: > 8 hours  NPO Liquid Status: > 2 hours     Airway   Mallampati: II  TM distance: >3 FB  no difficulty expected  Dental - normal exam     Pulmonary - normal exam   Cardiovascular - normal exam        Neuro/Psych  GI/Hepatic/Renal/Endo      Musculoskeletal     Abdominal    Substance History      OB/GYN          Other                                        Anesthesia Plan    ASA 2     general   (Adductor canal block)  intravenous induction   Anesthetic plan and risks discussed with patient.

## 2017-08-22 NOTE — THERAPY EVALUATION
Acute Care - Physical Therapy Initial Evaluation  [facility]     Patient Name: Denisse Flores  : 1960  MRN: [id number]  Today's Date: 2017   Onset of Illness/Injury or Date of Surgery Date: 17            Admit Date: 2017     Visit Dx:    ICD-10-CM ICD-9-CM   1. Difficulty walking R26.2 719.7     Patient Active Problem List   Diagnosis   • OA (osteoarthritis) of knee     Past Medical History:   Diagnosis Date   • Anxiety    • Arthritis    • High cholesterol    • History of skin cancer    • Migraine    • Vitamin D deficiency      Past Surgical History:   Procedure Laterality Date   •  SECTION      X 3   • CHOLECYSTECTOMY     • HYSTERECTOMY            PT ASSESSMENT (last 72 hours)      PT Evaluation       17 1539 17 1412    Rehab Evaluation    Document Type evaluation  -     Subjective Information agree to therapy  -     Patient Effort, Rehab Treatment good  -CH     Symptoms Noted During/After Treatment none  -CH     General Information    Onset of Illness/Injury or Date of Surgery Date 17  -     General Observations supine in bed, no acute distress noted at rest  -CH     Pertinent History Of Current Problem pt is POD 0 L TKA  -CH     Precautions/Limitations fall precautions  -CH     Prior Level of Function independent:;gait;transfer;bed mobility;ADL's  -     Equipment Currently Used at Home none  -     Plans/Goals Discussed With patient  -     Barriers to Rehab medically complex  -     Living Environment    Lives With  spouse  -    Living Arrangements  apartment  -    Home Accessibility  no concerns;bed and bath on same level  -AA    Stair Railings at Home  none  -AA    Type of Financial/Environmental Concern  none  -AA    Transportation Available  car;family or friend will provide  -    Living Environment Comment  wants rehab at discharge  -AA    Clinical Impression    Patient/Family Goals Statement to return to [facility]  -     Criteria for  Skilled Therapeutic Interventions Met treatment indicated  -     Impairments Found (describe specific impairments) gait, locomotion, and balance;muscle performance  -     Rehab Potential good, to achieve stated therapy goals  -     Pain Assessment    Pain Assessment 0-10  -     Pain Score 6  -     Pain Location Knee  -     Pain Orientation Left  -     Pain Intervention(s) Repositioned  -     Cognitive Assessment/Intervention    Current Cognitive/Communication Assessment functional  -     Orientation Status oriented x 4  -     Follows Commands/Answers Questions 100% of the time  -     Personal Safety WNL/WFL  -     Personal Safety Interventions fall prevention program maintained;gait belt;nonskid shoes/slippers when out of bed  -     ROM (Range of Motion)    General ROM lower extremity range of motion deficits identified   L knee ROM limited post-op  -     MMT (Manual Muscle Testing)    General MMT Assessment lower extremity strength deficits identified   L LE weakness noted post-op  -     Bed Mobility, Assessment/Treatment    Bed Mob, Supine to Sit, Titus verbal cues required;nonverbal cues required (demo/gesture);minimum assist (75% patient effort)  -     Bed Mob, Sit to Supine, Titus not tested   sitting in chair  -     Transfer Assessment/Treatment    Transfers, Sit-Stand Titus verbal cues required;nonverbal cues required (demo/gesture);minimum assist (75% patient effort)  -     Transfers, Stand-Sit Titus verbal cues required;nonverbal cues required (demo/gesture);minimum assist (75% patient effort)  -     Transfers, Sit-Stand-Sit, Assist Device rolling walker  -     Gait Assessment/Treatment    Gait, Titus Level verbal cues required;nonverbal cues required (demo/gesture);contact guard assist  -     Gait, Assistive Device rolling walker  -     Gait, Distance (Feet) 5   bed to chair  -     Gait, Gait Deviations tre  decreased;forward flexed posture;step length decreased;stride length decreased  -     Gait, Safety Issues balance decreased during turns;step length decreased  -     Gait, Impairments strength decreased;impaired balance  -     Gait, Comment pt with dizziness, nausea and feeling as though she is going to pass out  -     Motor Skills/Interventions    Additional Documentation Balance Skills Training (Group)  -     Balance Skills Training    Standing-Level of Assistance Contact guard  -     Static Standing Balance Support assistive device  -     Gait Balance-Level of Assistance Contact guard  -     Gait Balance Support assistive device  -     Therapy Exercises    Exercise Protocols total knee  -     Total Knee Exercises left:;10 reps;completed protocol;with assist  -     Positioning and Restraints    Pre-Treatment Position in bed  -     Post Treatment Position chair  -     In Chair reclined;call light within reach;encouraged to call for assist;notified nsg;TEMITOPEE elevated  -       08/22/17 0805       General Information    Equipment Currently Used at Home none  -KM       User Key  (r) = Recorded By, (t) = Taken By, (c) = Cosigned By    Initials Name Provider Type     Stefania Lepe, EDISON Physical Therapist    AA Joana Gr, RN Registered Nurse     Chanel Chisholm, RN Registered Nurse          Physical Therapy Education     Title: PT OT SLP Therapies (Done)     Topic: Physical Therapy (Done)     Point: Mobility training (Done)    Learning Progress Summary    Learner Readiness Method Response Comment Documented by Status   Patient Acceptance E,BEENA,CORY MATAMOROS,NR   08/22/17 1603 Done               Point: Home exercise program (Done)    Learning Progress Summary    Learner Readiness Method Response Comment Documented by Status   Patient Acceptance E,TB,CORY MATAMOROS,NR   08/22/17 1603 Done               Point: Body mechanics (Done)    Learning Progress Summary    Learner Readiness Method Response  Comment Documented by Status   Patient Acceptance E,TB,CORY MATAMOROS,NR   08/22/17 1603 Done               Point: Precautions (Done)    Learning Progress Summary    Learner Readiness Method Response Comment Documented by Status   Patient Acceptance BEENA TRIPATHI D VU,NR   08/22/17 1603 Done                      User Key     Initials Effective Dates Name Provider Type Discipline     12/01/15 -  Stefania Lepe, PT Physical Therapist PT                PT Recommendation and Plan  Anticipated Discharge Disposition: skilled nursing facility, home with home health (pt states she is interested in SNF because  works)  Planned Therapy Interventions: balance training, bed mobility training, gait training, home exercise program, patient/family education, transfer training  PT Frequency: 2 times/day  Plan of Care Review  Plan Of Care Reviewed With: patient  Outcome Summary/Follow up Plan: Pt presents with impaired functional mobility and gait secondary to L LE pain, weakness, and decreased activity tolerance s/p L TKA. Gait distance was limited today secondary to pt having dizziness, nausea and feeling as though she may pass out. Pt may benefit from skilled PT to address strength, ROM, and mobilty.          IP PT Goals       08/22/17 1604          Transfer Training PT LTG    Transfer Training PT LTG, Time to Achieve 3 days  -CH      Transfer Training PT LTG, Activity Type all transfers  -CH      Transfer Training PT LTG, [place] Level supervision required  -CH      Transfer Training PT LTG, Assist Device walker, rolling  -CH      Gait Training PT LTG    Gait Training Goal PT LTG, Time to Achieve 1 wk  -CH      Gait Training Goal PT LTG, [place] Level supervision required  -CH      Gait Training Goal PT LTG, Assist Device walker, rolling  -CH      Gait Training Goal PT LTG, Distance to Achieve 150  -CH      Range of Motion PT LTG    Range of Motion Goal PT LTG, Time to Achieve 3 days  -CH      Range fo Motion Goal PT LTG,  Joint L knee  -      Range of Motion Goal PT LTG, AROM Measure 0-90  -        User Key  (r) = Recorded By, (t) = Taken By, (c) = Cosigned By    Initials Name Provider Type     Stefania Lepe PT Physical Therapist                Outcome Measures       08/22/17 1600          How much help from another person do you currently need...    Turning from your back to your side while in flat bed without using bedrails? 3  -CH      Moving from lying on back to sitting on the side of a flat bed without bedrails? 3  -CH      Moving to and from a bed to a chair (including a wheelchair)? 3  -CH      Standing up from a chair using your arms (e.g., wheelchair, bedside chair)? 3  -CH      Climbing 3-5 steps with a railing? 2  -CH      To walk in hospital room? 2  -CH      AM-PAC 6 Clicks Score 16  -CH      Functional Assessment    Outcome Measure Options AM-PAC 6 Clicks Basic Mobility (PT)  -        User Key  (r) = Recorded By, (t) = Taken By, (c) = Cosigned By    Initials Name Provider Type     Stefania Lepe PT Physical Therapist           Time Calculation:         PT Charges       08/22/17 1607          Time Calculation    Start Time 1520  -      Stop Time 1539  -      Time Calculation (min) 19 min  -      PT Received On 08/22/17  -      PT - Next Appointment 08/23/17  -      PT Goal Re-Cert Due Date 08/29/17  -        User Key  (r) = Recorded By, (t) = Taken By, (c) = Cosigned By    Initials Name Provider Type     Stefania Lepe PT Physical Therapist          Therapy Charges for Today     Code Description Service Date Service Provider Modifiers Qty    32678033718  PT EVAL MOD COMPLEXITY 2 8/22/2017 Stefania Lepe, PT GP 1    70350925169 HC PT THER PROC EA 15 MIN 8/22/2017 Stefania Lepe, PT GP 1          PT G-Codes  Outcome Measure Options: AM-PAC 6 Clicks Basic Mobility (PT)      Stefania Lepe, PT  8/22/2017

## 2017-08-22 NOTE — ANESTHESIA POSTPROCEDURE EVALUATION
"Patient: Denisse Flores    Procedure Summary     Date Anesthesia Start Anesthesia Stop Room / Location    08/22/17 1011 1214  [facility] / [facility]       Procedure Diagnosis Surgeon Provider    LT TOTAL KNEE ARTHROPLASTY (Left Knee) No diagnosis on file. MD Danilo Martni MD          Anesthesia Type: general  Last vitals  BP   119/77 (08/22/17 1315)    Temp        Pulse   95 (08/22/17 1315)   Resp   16 (08/22/17 1315)    SpO2   98 % (08/22/17 1315)      Post Anesthesia Care and Evaluation    Patient location during evaluation: PACU  Patient participation: complete - patient participated  Level of consciousness: awake and alert  Pain management: adequate  Airway patency: patent  Anesthetic complications: No anesthetic complications  PONV Status: none  Cardiovascular status: acceptable  Respiratory status: acceptable  Hydration status: acceptable    Comments: /77  Pulse 95  Temp 36.7 °C (98 °F) (Oral)   Resp 16  Ht 65\" (165.1 cm)  Wt 155 lb (70.3 kg)  SpO2 98%  BMI 25.79 kg/m2        "

## 2017-08-22 NOTE — CODE DOCUMENTATION
RRT called d/t pt complaint of SOA. Pt has been sitting up in chair. Called RN d/t SOA. Upon arrival pt sitting up in bed, currently O2 sats 99% RA, placed on O2 d/t complaint of soa. Pt had shallow respirations, diff to assess lung sounds d/t noisy breathing. No acute distress noted. Rt at bedside for breathing tx.

## 2017-08-22 NOTE — OP NOTE
Operative Note    Patient Name:  Denisse Flores  YOB: 1960  Medical Records Number:  [id number]    Date of Procedure:  8/22/2017    Pre-operative Diagnosis:  Primary Osteoarthritis Left Knee    Post-operative Diagnosis:  Primary Osteoarthritis Left Knee    Procedure Performed:  Left Total Knee Arthroplasty    Implants:  Biomet Vanguard TKA, 62.5 Femoral Component, 67 Tibial Tray with a 40 mm Modular Stem, 31 3-Peg Patella, 10 AS  Polyethylene Insert    Surgeon:  Pedro Priest M.D.    Assistant: Devi Stratton (who was present during the critical portions of the case, thereby decreasing operative time and patient morbidity)    Anesthetic Type:  General    Estimated Blood Loss:  250cc's  No Complications      Indications for Procedure:  Denisse Flores is a 57 y.o. female suffering from end stage degenerative changes in the left knee.  The patients pain is becoming disabling, despite extensive conservative care, including NSAIDS, therapy and injections.  The patient wishes to undergo left total knee arthroplasty.  The risks, benefits and alternatives were discussed and the patient wishes to proceed with total knee arthroplasty.      Procedure Performed:    After informed consent was obtained and pre-operative IV Kefzol given, prior to tourniquet inflation, the patient was taken to the operating room and placed supine on the operating table.  After general anesthesia induced, the patient's left lower extremity was prepped with chloraprep and draped in a sterile fashion.    A midline incision was made overlying the left knee and we sharply dissected down to expose the parapatellar retinaculum.  A mid-vastus, muscle sparing, parapatellar arthrotomy was performed and we elevated the soft tissue both medially and laterally.  The menisci and ACL were excised.  We everted the patella and measured this to be 22 mm and we removed 7 mm of bone down to 15mm.  We measured the patella to be 31 and drilled our three  "drill holes.  We protected the patella with the patella protector and turned our attention to the femur and the tibia.    We drilled drill holes into the femoral and the tibial intramedullary canals and proceeded to irrigate the canals to remove the fatty marrow.  We used the intramedullary henry and the 5 degree valgus cut block to make our distal femoral cut.  Once we had a smooth surface, we measured the femur to be 62.5.  We assured we had rotational alignment using the epicondylar axis, then we used the four-in-one cut block to make our anterior, posterior, anterior and posterior chamfer cuts.  We placed our femoral trial, had excellent fit, and extended the knee and marked Wilkinson's line on the tibia.      We then turned our attention to the tibia, where we irrigated the canal again.  We used the intramedullary henry and the 3 degree posterior sloped cut block to remove 2 mm of bone from the effected side of the tibia.  Prior to making our cut, we assured we had rotational alignment using the external guide and Amber's line.  Once we had a smooth surface, we measured the tibia to be 67.  We then removed soft tissue and bony debris from the posterior aspect of the knee.    We placed our trial implants and had excellent fit, range of motion, stability and ligament balance, throughout a complete arc of motion with a 10 AS polyethylene liner.  We removed our trial implants, punched the tibial keel, copiously irrigated the knee, gave 1 gm of IV Tranxemic Acid, then cemented our implants in place using palacos cement.  Once the cement cured, we trialed again with the 10 and 12 AS, standard and posterior lipped polyethylene liners.  The 10 AS gave us the best range of motion, stability and ligament balance, throughout a complete arc of motion.  We removed the trials, copiously irrigated the knee, gave IV antibiotics and local anesthetic, then placed our permanent polyethylene liner.  We placed a 1/8\" hemovac drain, " then closed the arthrotomy with #1 Vicryl pop-off sutures.  The subcutaneous tissue was closed with 2-0 vicryl pop-off sutures.  The skin was closed with staples.  We placed a sterile dressing of Xeroform, 4x4's, abdominal pads, cast padding and an Ace Wrap.  The patient was then awakened from general anesthesia and taken to the recovery room in stable condition.    The patient will be started on Aspirin 325mg twice daily for DVT prophylaxis.  IV antibiotics will be discontinued within 24 hours of surgery.  Immediately prior to surgery, there were no acute Thromboembolic nor Cardiovascular risk factors.  An updated Medical Reconciliation form is on the chart.

## 2017-08-22 NOTE — CODE DOCUMENTATION
RRT called (second one) d/t pt complains again of SOA. Pt still sitting up in bed, resp even, unlabored. No distress noted. Lung sounds clear/diminished. Resp shallow. No wheezing noted. O2 sats 100% 1L/NC. Call placed to MD,  Primary RN spoke with Dr. Blood. New orders to consult A rec'd.

## 2017-08-22 NOTE — CODE DOCUMENTATION
Breathing tx done. Pt with quiet respirations. States feels much better. No distress noted. Primary Rn to notify MD. O2 sats 100% 1 L/NC.

## 2017-08-22 NOTE — H&P
"History and Physical    Patient Name:  Denises Flores  YOB: 1960    Medical Records Number:  [id number]    Date of Admission:  8/22/2017  7:12 AM    Chief Complaint:  OA (osteoarthritis) of knee [M17.9]    Denisse Flores is a 57 y.o. female who presents c/o severe left knee pain.  The pain has been on and off for many years, worsening to the point where the pain is becoming disabling.  The pain is a constant dull ache with occasional sharp, stabbing pain.  The patient has failed conservative treatment and would like to proceed with total knee arthroplasty.    /69 (BP Location: Left arm, Patient Position: Lying)  Pulse 72  Temp 98 °F (36.7 °C)  Resp 14  Ht 65\" (165.1 cm)  Wt 155 lb (70.3 kg)  SpO2 98%  BMI 25.79 kg/m2    Past Medical History:    Past Medical History:   Diagnosis Date   • Anxiety    • Arthritis    • High cholesterol    • History of skin cancer 1970'S   • Migraine    • Vitamin D deficiency        Social History:    Social History     Social History   • Marital status:      Spouse name: N/A   • Number of children: N/A   • Years of education: N/A     Occupational History   • Not on file.     Social History Main Topics   • Smoking status: Never Smoker   • Smokeless tobacco: Never Used   • Alcohol use Yes      Comment: RARELY   • Drug use: No   • Sexual activity: Defer     Other Topics Concern   • Not on file     Social History Narrative       Family History:    Family History   Problem Relation Age of Onset   • Malig Hyperthermia Neg Hx        Current Medications:    Current Facility-Administered Medications:   •  acetaminophen (TYLENOL) tablet 1,000 mg, 1,000 mg, Oral, Once, Pedro Priest MD  •  ceFAZolin in dextrose (ANCEF) IVPB solution 2 g, 2 g, Intravenous, Once, Pedro Priest MD  •  fentaNYL citrate (PF) (SUBLIMAZE) injection 50 mcg, 50 mcg, Intravenous, Q10 Min PRN, Fausto Orellana MD, 50 mcg at 08/22/17 0852  •  lactated ringers infusion, 9 mL/hr, Intravenous, " Continuous, Fausto Orellana MD  •  midazolam (VERSED) injection 1 mg, 1 mg, Intravenous, Q5 Min PRN, 2 mg at 08/22/17 0852 **OR** midazolam (VERSED) injection 2 mg, 2 mg, Intravenous, Q5 Min PRN, Fausto Orellana MD  •  ropivacaine (NAROPIN) 50 mL, Morphine (PF) 5 mg, ketorolac (TORADOL) 30 mg, EPINEPHrine (ADRENALIN) 0.3 mg in sodium chloride 0.9 % 101.8 mL, , Injection, Once, Pedro Priest MD  •  sodium chloride 0.9 % flush 1-10 mL, 1-10 mL, Intravenous, PRN, Fausto Orellana MD    Facility-Administered Medications Ordered in Other Encounters:   •  ropivacaine (NAROPIN) 0.5 % injection, , , PRN, Fausto Orellana MD, 30 mL at 08/22/17 0859    Allergies:    Allergies   Allergen Reactions   • Celecoxib Swelling     FACIAL SWELLING       Review of Systems:   HEENT: Patient denies any headaches, vision changes, change in hearing, or tinnitus, Patient denies any rhinorrhea,epistaxis, sinus pain, mouth or dental problems, sore throat or hoarseness, or dysphagia  Pulmonary: Patient denies any cough, congestion, SOA, or wheezing  Cardiovascular: Patient denies any chest pain, dyspnea, palpitations, weakness, intolerance of exercise, varicosities, swelling of extremities, known murmur  Gastrointestinal:  Patient denies nausea, vomiting, diarrhea, constipation, loss  of appetite, change in appetite, dysphagia, gas, heartburn, melena, change in bowel habits, use of laxatives or other drugs to alter the function of the gastrointestinal tract.  Genital/Urinary: Patient denies dysuria, change in color of urine, change in frequency of urination, pain with urgency, incontinence, retention, or nocturia.  Musculoskeletal: Patient denies increased warmth; redness; or swelling of joints; limitation of function; deformity; crepitation: pain in a joint or an extremity, the neck, or the back, especially with movement.  Neurological: Patient denies dizziness, tremor, ataxia, difficulty in speaking, change in speech, paresthesia, loss of  sensation, seizures, syncope, changes in memory.  Endocrine system: Patient denies tremors, palpitations, intolerance of heat or cold, polyuria, polydipsia, polyphagia, diaphoresis, exophthalmos, or goiter.  Psychological: Patient denies thoughts/plans or harming self or other; depression,  insomnia, night terrors, pasha, memory loss, disorientation.  Skin: Patient denies any bruising, rashes, discoloration, pruritus, wounds, ulcers, decubiti, changes in the hair or nails  Hematopoietic: Patient denies history of spontaneous or excessive bleeding, epistaxis, hematuria, melena, fatigue, enlarged or tender lymph nodes, pallor, history of anemia.        Physical Exam:   Awake, A&O x3, affect normal, no acute distress  Ambulating with a limp due to knee pain  Knee ROM is limited due to pain (5-115)  Strength is 4/5 in the quad, hamstring and calf  Cap refill is normal, Sensation intact    Card:  RR, HD Stable  Pulm:  Regular breathing, no S.O.A  Abd:  Soft, NT, ND    Lab Results (last 24 hours)     ** No results found for the last 24 hours. **          Xr Knee 1 Or 2 View Left    Result Date: 8/15/2017  Narrative: PA AND LATERAL CHEST X-RAY AND 2 VIEWS LEFT KNEE - 08/15/2017  HISTORY: Preop left knee surgery.  FINDINGS:  CHEST: There are no prior chest x-rays for comparison. The cardiomediastinal silhouette and the pulmonary vasculature are within normal limits. The lungs are clear. The costophrenic angles are sharp.      Impression: No active disease is seen in the chest.   LEFT KNEE:  Two views including standing AP and lateral views of the left knee are submitted for interpretation. I see no radiographic evidence of acute fracture or malalignment or acute osseous abnormality in the bones of the left knee.  There is perhaps minimal joint space narrowing in the medial and lateral compartments; no significant marginal spurring is seen.  IMPRESSION:  Other than equivocal minimal joint space narrowing in the medial and  lateral compartments of the left knee, unremarkable plain films of the left knee.  This report was finalized on 8/15/2017 1:07 PM by Dr. Pedro Blair MD.      Xr Chest Pa & Lateral    Result Date: 8/15/2017  Narrative: PA AND LATERAL CHEST X-RAY AND 2 VIEWS LEFT KNEE - 08/15/2017  HISTORY: Preop left knee surgery.  FINDINGS:  CHEST: There are no prior chest x-rays for comparison. The cardiomediastinal silhouette and the pulmonary vasculature are within normal limits. The lungs are clear. The costophrenic angles are sharp.      Impression: No active disease is seen in the chest.   LEFT KNEE:  Two views including standing AP and lateral views of the left knee are submitted for interpretation. I see no radiographic evidence of acute fracture or malalignment or acute osseous abnormality in the bones of the left knee.  There is perhaps minimal joint space narrowing in the medial and lateral compartments; no significant marginal spurring is seen.  IMPRESSION:  Other than equivocal minimal joint space narrowing in the medial and lateral compartments of the left knee, unremarkable plain films of the left knee.  This report was finalized on 8/15/2017 1:07 PM by Dr. Pedro Blair MD.          Assessment:  End-stage Primary left Knee Osteoarthritis    Plan:  Patient's pain is becoming disabling, despite extensive conservative treatment.  Radiographs reveal end-stage degenerative changes.  The risks of surgery, including, but not limited to, heart attack, stroke, dying, DVT, arthrofibrosis and infection were discussed.  The alternatives and benefits were also discussed.  All questions answered and the patient wishes to proceed with left total knee arthroplasty.

## 2017-08-22 NOTE — ANESTHESIA PROCEDURE NOTES
Peripheral Block    Patient location during procedure: holding area  Start time: 8/22/2017 8:54 AM  Stop time: 8/22/2017 9:00 AM  Reason for block: at surgeon's request and post-op pain management  Performed by  Anesthesiologist: DEEDEE WINTER  Preanesthetic Checklist  Completed: patient identified, site marked, surgical consent, pre-op evaluation, timeout performed, IV checked, risks and benefits discussed and monitors and equipment checked  Prep:  Sterile barriers:cap, gloves and mask  Prep: ChloraPrep  Patient monitoring: blood pressure monitoring, continuous pulse oximetry and EKG  Procedure  Sedation:yes  Performed under: PNB  Guidance:ultrasound guided  ULTRASOUND INTERPRETATION.  Using ultrasound guidance a 21 G gauge needle was placed in close proximity to the femoral nerve, at which point, under ultrasound guidance anesthetic was injected in the area of the nerve and spread of the anesthesia was seen on ultrasound in close proximity thereto.  There were no abnormalities seen on ultrasound; a digital image was taken; and the patient tolerated the procedure with no complications. Images:still images obtained    Laterality:left  Block Type:adductor canal block  Injection Technique:single-shotNeedle Gauge:21 G    Medications  Local Injected:ropivacaine 0.5% without epinephrine Local Amount Injected:30mL  Post Assessment  Injection Assessment: negative aspiration for heme, no paresthesia on injection and incremental injection  Patient Tolerance:comfortable throughout block  Complications:no

## 2017-08-23 PROBLEM — G47.00 INSOMNIA: Status: ACTIVE | Noted: 2017-08-23

## 2017-08-23 LAB
ANION GAP SERPL CALCULATED.3IONS-SCNC: 15.3 MMOL/L
BUN BLD-MCNC: 12 MG/DL (ref 6–20)
BUN/CREAT SERPL: 16.4 (ref 7–25)
CALCIUM SPEC-SCNC: 8.8 MG/DL (ref 8.6–10.5)
CHLORIDE SERPL-SCNC: 100 MMOL/L (ref 98–107)
CO2 SERPL-SCNC: 21.7 MMOL/L (ref 22–29)
CREAT BLD-MCNC: 0.73 MG/DL (ref 0.57–1)
GFR SERPL CREATININE-BSD FRML MDRD: 82 ML/MIN/1.73
GLUCOSE BLD-MCNC: 150 MG/DL (ref 65–99)
HCT VFR BLD AUTO: 34.7 % (ref 35.6–45.5)
HGB BLD-MCNC: 11.5 G/DL (ref 11.9–15.5)
POTASSIUM BLD-SCNC: 4.5 MMOL/L (ref 3.5–5.2)
SODIUM BLD-SCNC: 137 MMOL/L (ref 136–145)

## 2017-08-23 PROCEDURE — 25010000002 MORPHINE SULFATE (PF) 2 MG/ML SOLUTION: Performed by: ORTHOPAEDIC SURGERY

## 2017-08-23 PROCEDURE — 85018 HEMOGLOBIN: CPT | Performed by: ORTHOPAEDIC SURGERY

## 2017-08-23 PROCEDURE — 85014 HEMATOCRIT: CPT | Performed by: ORTHOPAEDIC SURGERY

## 2017-08-23 PROCEDURE — 97110 THERAPEUTIC EXERCISES: CPT

## 2017-08-23 PROCEDURE — 97150 GROUP THERAPEUTIC PROCEDURES: CPT

## 2017-08-23 PROCEDURE — 80048 BASIC METABOLIC PNL TOTAL CA: CPT | Performed by: ORTHOPAEDIC SURGERY

## 2017-08-23 RX ORDER — HYDROCODONE BITARTRATE AND ACETAMINOPHEN 10; 325 MG/1; MG/1
2 TABLET ORAL EVERY 4 HOURS PRN
Status: DISCONTINUED | OUTPATIENT
Start: 2017-08-23 | End: 2017-08-25 | Stop reason: HOSPADM

## 2017-08-23 RX ORDER — HYDROCODONE BITARTRATE AND ACETAMINOPHEN 10; 325 MG/1; MG/1
1 TABLET ORAL EVERY 4 HOURS PRN
Status: DISCONTINUED | OUTPATIENT
Start: 2017-08-23 | End: 2017-08-25 | Stop reason: HOSPADM

## 2017-08-23 RX ORDER — POLYETHYLENE GLYCOL 3350 17 G/17G
17 POWDER, FOR SOLUTION ORAL DAILY PRN
Status: DISCONTINUED | OUTPATIENT
Start: 2017-08-23 | End: 2017-08-25 | Stop reason: HOSPADM

## 2017-08-23 RX ADMIN — HYDROCODONE BITARTRATE AND ACETAMINOPHEN 2 TABLET: 10; 325 TABLET ORAL at 17:44

## 2017-08-23 RX ADMIN — DOCUSATE SODIUM 100 MG: 100 CAPSULE, LIQUID FILLED ORAL at 17:44

## 2017-08-23 RX ADMIN — ESTRADIOL 1 MG: 1 TABLET ORAL at 09:16

## 2017-08-23 RX ADMIN — CYANOCOBALAMIN TAB 500 MCG 1000 MCG: 500 TAB at 09:15

## 2017-08-23 RX ADMIN — HYDROCODONE BITARTRATE AND ACETAMINOPHEN 2 TABLET: 10; 325 TABLET ORAL at 21:44

## 2017-08-23 RX ADMIN — ASPIRIN 325 MG: 325 TABLET, DELAYED RELEASE ORAL at 17:43

## 2017-08-23 RX ADMIN — POLYETHYLENE GLYCOL 3350 17 G: 17 POWDER, FOR SOLUTION ORAL at 18:20

## 2017-08-23 RX ADMIN — CETIRIZINE HYDROCHLORIDE 10 MG: 10 TABLET, FILM COATED ORAL at 09:16

## 2017-08-23 RX ADMIN — MORPHINE SULFATE 6 MG: 2 INJECTION, SOLUTION INTRAMUSCULAR; INTRAVENOUS at 18:20

## 2017-08-23 RX ADMIN — OXYCODONE HYDROCHLORIDE AND ACETAMINOPHEN 2 TABLET: 5; 325 TABLET ORAL at 14:52

## 2017-08-23 RX ADMIN — ESCITALOPRAM 10 MG: 10 TABLET, FILM COATED ORAL at 09:15

## 2017-08-23 RX ADMIN — ZOLPIDEM TARTRATE 10 MG: 5 TABLET, FILM COATED ORAL at 21:44

## 2017-08-23 RX ADMIN — OXYCODONE HYDROCHLORIDE AND ACETAMINOPHEN 1 TABLET: 5; 325 TABLET ORAL at 07:21

## 2017-08-23 RX ADMIN — ATORVASTATIN CALCIUM 10 MG: 10 TABLET, FILM COATED ORAL at 20:33

## 2017-08-23 RX ADMIN — DOCUSATE SODIUM -SENNOSIDES 2 TABLET: 50; 8.6 TABLET, COATED ORAL at 09:16

## 2017-08-23 RX ADMIN — ASPIRIN 325 MG: 325 TABLET, DELAYED RELEASE ORAL at 09:15

## 2017-08-23 RX ADMIN — MUPIROCIN: 20 OINTMENT TOPICAL at 17:44

## 2017-08-23 RX ADMIN — MUPIROCIN: 20 OINTMENT TOPICAL at 09:16

## 2017-08-23 RX ADMIN — OXYCODONE HYDROCHLORIDE AND ACETAMINOPHEN 1 TABLET: 5; 325 TABLET ORAL at 01:08

## 2017-08-23 RX ADMIN — FERROUS SULFATE TAB 325 MG (65 MG ELEMENTAL FE) 325 MG: 325 (65 FE) TAB at 09:15

## 2017-08-23 RX ADMIN — OXYCODONE HYDROCHLORIDE AND ACETAMINOPHEN 2 TABLET: 5; 325 TABLET ORAL at 11:04

## 2017-08-23 NOTE — PROGRESS NOTES
"Ortho POD 1    Patients Name:  Denisse Flores  YOB: 1960  Medical Records Number:  [id number]    No complaints except pain    /62 (BP Location: Left arm, Patient Position: Lying)  Pulse 93  Temp 98.3 °F (36.8 °C) (Oral)   Resp 16  Ht 65\" (165.1 cm)  Wt 155 lb (70.3 kg)  SpO2 95%  BMI 25.79 kg/m2    LLE:  NVI, calf nontender, Sensation intact  No signs of DVT    Incision: clean, no infection    Lab Results (last 24 hours)     Procedure Component Value Units Date/Time    POC Glucose Fingerstick [[id number]]  (Abnormal) Collected:  08/22/17 1801    Specimen:  Blood Updated:  08/22/17 1803     Glucose 159 (H) mg/dL     Narrative:       Meter: [id number] : [id number] Jatinder Mistry    CBC & Differential [[id number]] Collected:  08/22/17 2043    Specimen:  Blood Updated:  08/22/17 2059    Narrative:       The following orders were created for panel order CBC & Differential.  Procedure                               Abnormality         Status                     ---------                               -----------         ------                     CBC Auto Differential[[id number]]        Abnormal            Final result                 Please view results for these tests on the individual orders.    CBC Auto Differential [[id number]]  (Abnormal) Collected:  08/22/17 2043    Specimen:  Blood Updated:  08/22/17 2059     WBC 18.39 (H) 10*3/mm3      RBC 3.72 (L) 10*6/mm3      Hemoglobin 11.9 g/dL      Hematocrit 37.5 %      .8 (H) fL      MCH 32.0 pg      MCHC 31.7 (L) g/dL      RDW 13.5 (H) %      RDW-SD 49.8 fl      MPV 10.3 fL      Platelets 264 10*3/mm3      Neutrophil % 94.3 (H) %      Lymphocyte % 3.5 (L) %      Monocyte % 1.7 (L) %      Eosinophil % 0.0 (L) %      Basophil % 0.1 %      Immature Grans % 0.4 %      Neutrophils, Absolute 17.35 (H) 10*3/mm3      Lymphocytes, Absolute 0.65 (L) 10*3/mm3      Monocytes, Absolute 0.31 10*3/mm3      Eosinophils, Absolute 0.00 10*3/mm3      " Basophils, Absolute 0.01 10*3/mm3      Immature Grans, Absolute 0.07 (H) 10*3/mm3     Basic Metabolic Panel [[id number]]  (Abnormal) Collected:  08/22/17 2043    Specimen:  Blood Updated:  08/22/17 2125     Glucose 168 (H) mg/dL      BUN 13 mg/dL      Creatinine 0.78 mg/dL      Sodium 138 mmol/L      Potassium 4.2 mmol/L      Chloride 99 mmol/L      CO2 19.3 (L) mmol/L      Calcium 8.6 mg/dL      eGFR Non African Amer 76 mL/min/1.73      BUN/Creatinine Ratio 16.7     Anion Gap 19.7 mmol/L     Narrative:       GFR Normal >60  Chronic Kidney Disease <60  Kidney Failure <15    Hemoglobin & Hematocrit, Blood [[id number]]  (Abnormal) Collected:  08/23/17 0452    Specimen:  Blood Updated:  08/23/17 0522     Hemoglobin 11.5 (L) g/dL      Hematocrit 34.7 (L) %     Basic Metabolic Panel [[id number]]  (Abnormal) Collected:  08/23/17 0452    Specimen:  Blood Updated:  08/23/17 0541     Glucose 150 (H) mg/dL      BUN 12 mg/dL      Creatinine 0.73 mg/dL      Sodium 137 mmol/L      Potassium 4.5 mmol/L      Chloride 100 mmol/L      CO2 21.7 (L) mmol/L      Calcium 8.8 mg/dL      eGFR Non African Amer 82 mL/min/1.73      BUN/Creatinine Ratio 16.4     Anion Gap 15.3 mmol/L     Narrative:       GFR Normal >60  Chronic Kidney Disease <60  Kidney Failure <15          Xr Knee 1 Or 2 View Left    Result Date: 8/22/2017  Narrative: 2-VIEW PORTABLE LEFT KNEE  HISTORY: Knee replacement for osteoarthritis.  FINDINGS: The patient has had recent total knee replacement and the alignment appears satisfactory.  This report was finalized on 8/22/2017 1:49 PM by Dr. Dameon Bruno MD.      Xr Knee 1 Or 2 View Left    Result Date: 8/15/2017  Narrative: PA AND LATERAL CHEST X-RAY AND 2 VIEWS LEFT KNEE - 08/15/2017  HISTORY: Preop left knee surgery.  FINDINGS:  CHEST: There are no prior chest x-rays for comparison. The cardiomediastinal silhouette and the pulmonary vasculature are within normal limits. The lungs are clear. The costophrenic angles are  fred.      Impression: No active disease is seen in the chest.   LEFT KNEE:  Two views including standing AP and lateral views of the left knee are submitted for interpretation. I see no radiographic evidence of acute fracture or malalignment or acute osseous abnormality in the bones of the left knee.  There is perhaps minimal joint space narrowing in the medial and lateral compartments; no significant marginal spurring is seen.  IMPRESSION:  Other than equivocal minimal joint space narrowing in the medial and lateral compartments of the left knee, unremarkable plain films of the left knee.  This report was finalized on 8/15/2017 1:07 PM by Dr. Pedro Blair MD.      Xr Chest Pa & Lateral    Result Date: 8/15/2017  Narrative: PA AND LATERAL CHEST X-RAY AND 2 VIEWS LEFT KNEE - 08/15/2017  HISTORY: Preop left knee surgery.  FINDINGS:  CHEST: There are no prior chest x-rays for comparison. The cardiomediastinal silhouette and the pulmonary vasculature are within normal limits. The lungs are clear. The costophrenic angles are sharp.      Impression: No active disease is seen in the chest.   LEFT KNEE:  Two views including standing AP and lateral views of the left knee are submitted for interpretation. I see no radiographic evidence of acute fracture or malalignment or acute osseous abnormality in the bones of the left knee.  There is perhaps minimal joint space narrowing in the medial and lateral compartments; no significant marginal spurring is seen.  IMPRESSION:  Other than equivocal minimal joint space narrowing in the medial and lateral compartments of the left knee, unremarkable plain films of the left knee.  This report was finalized on 8/15/2017 1:07 PM by Dr. Pedro Blair MD.        S/p Left TKA  WBAT with walker  ASA for DVT prophylaxis

## 2017-08-23 NOTE — PROGRESS NOTES
Discharge Planning Assessment  [facility]     Patient Name: Denisse Flores  MRN: [id number]  Today's Date: 8/23/2017    Admit Date: 8/22/2017          Discharge Needs Assessment     None            Discharge Plan       08/23/17 1602    Case Management/Social Work Plan    Plan Home w/ spouse and [facility].    Patient/Family In Agreement With Plan yes    Additional Comments S/w pt verified facesheet and discussed d/c planning. Pt is agreeable to d/c home w/ [facility], she had planned to d/c to a SNF, but she did not register w/ a facility. [facility], she is walking 100 feet.  Karin/[facility] notified and can accept. CCP will follow pt's progress for d/c needs.         Discharge Placement     Facility/Agency Request Status Selected? Address Phone Number Fax Number    [facility] Accepted    [address] [address] [phone]                Demographic Summary     None            Functional Status     None            Psychosocial     None            Abuse/Neglect     None            Legal     None            Substance Abuse     None            Patient Forms     None          Liana Huffman, SHANIA

## 2017-08-23 NOTE — PROGRESS NOTES
Name: Denisse Floers ADMIT: 2017   : 1960  PCP: Maye Mccurdy, APRN    MRN: [id number] LOS: 1 days   AGE/SEX: 57 y.o. female  ROOM: [facility]   Subjective   Subjective  Cc - knee pain    Knee pain worse today  Worked with PT  Partial improved with po pain meds  No more soa/anxiety    ROS  No f/c  No n/v  Objective   Vital Signs  Temp:  [97.7 °F (36.5 °C)-99.3 °F (37.4 °C)] 98.3 °F (36.8 °C)  Heart Rate:  [87-98] 87  Resp:  [16-18] 16  BP: ()/(54-75) 120/75  SpO2:  [95 %-99 %] 99 %  on  Flow (L/min):  [1] 1;   O2 Device: room air  Body mass index is 25.79 kg/(m^2).    Physical Exam    Alert, nad  Lungs clear no resp distress  Soft, nt  Knee with post op swelling  o x 3    Results Review:       I reviewed the patient's new clinical results.    Results from last 7 days  Lab Units 17  0452 17  2043   WBC 10*3/mm3  --  18.39*   HEMOGLOBIN g/dL 11.5* 11.9   PLATELETS 10*3/mm3  --  264     Results from last 7 days  Lab Units 17  0452 17  2043   SODIUM mmol/L 137 138   POTASSIUM mmol/L 4.5 4.2   CHLORIDE mmol/L 100 99   CO2 mmol/L 21.7* 19.3*   BUN mg/dL 12 13   CREATININE mg/dL 0.73 0.78   GLUCOSE mg/dL 150* 168*   Estimated Creatinine Clearance: 84.6 mL/min (by C-G formula based on Cr of 0.73).  Results from last 7 days  Lab Units 17  0452 17  2043   CALCIUM mg/dL 8.8 8.6     XR Knee 1 or 2 View Left [[id number]] Not Reviewed        Order Status: Completed Collected: 17 1245        Updated: 17 1352       Narrative:         2-VIEW PORTABLE LEFT KNEE     HISTORY: Knee replacement for osteoarthritis.     FINDINGS: The patient has had recent total knee replacement and the  alignment appears satisfactory.     This report was finalized on 2017 1:49 PM by Dr. Dameon Bruno MD.          XR Knee 1 or 2 View Left [[id number]] Not Reviewed       Order Status: Completed Collected: 08/15/17 1025        Updated: 08/15/17 1310       Narrative:         PA AND  LATERAL CHEST X-RAY AND 2 VIEWS LEFT KNEE - 08/15/2017     HISTORY: Preop left knee surgery.     FINDINGS:    CHEST: There are no prior chest x-rays for comparison. The  cardiomediastinal silhouette and the pulmonary vasculature are within  normal limits. The lungs are clear. The costophrenic angles are sharp.          Impression:         No active disease is seen in the chest.        LEFT KNEE:  Two views including standing AP and lateral views of the  left knee are submitted for interpretation. I see no radiographic  evidence of acute fracture or malalignment or acute osseous abnormality  in the bones of the left knee.  There is perhaps minimal joint space  narrowing in the medial and lateral compartments; no significant  marginal spurring is seen.     IMPRESSION:  Other than equivocal minimal joint space narrowing in the  medial and lateral compartments of the left knee, unremarkable plain  films of the left knee.          aspirin 325 mg Oral BID With Meals   atorvastatin 10 mg Oral Nightly   cetirizine 10 mg Oral Daily   escitalopram 10 mg Oral Daily   estradiol 1 mg Oral Daily   ferrous sulfate 325 mg Oral Daily With Breakfast   mupirocin  Each Nare BID   sennosides-docusate sodium 2 tablet Oral BID   vitamin B-12 1,000 mcg Oral Daily   zolpidem 10 mg Oral Nightly       lactated ringers 9 mL/hr Last Rate: Stopped (08/22/17 1410)   sodium chloride 100 mL/hr Last Rate: 100 mL/hr (08/22/17 1410)   Diet Regular      Assessment/Plan   Assessment:     Active Hospital Problems (** Indicates Principal Problem)    Diagnosis Date Noted   • OA (osteoarthritis) of knee [M17.9] 08/22/2017   • Anxiety [F41.9] 08/22/2017   • Dyspnea [R06.00] 08/22/2017   • Leukocytosis [D72.829] 08/22/2017      Resolved Hospital Problems    Diagnosis Date Noted Date Resolved   No resolved problems to display.       Plan:   - Event 8/22 likely seems to be panic attack. No more anxiety, no more dyspnea.  -Continue pain control, PT, incentive  spirometry, DVT proph  -Repeat CBC in AM to monitor leukocytosis  -Ambien prn    D/W family, d/w RN    Disposition  TBD.      Luis Crow MD  [facility]  08/23/17  7:03 PM

## 2017-08-23 NOTE — CONSULTS
[facility]  [phone]    CONSULT NOTE    INTERNAL MEDICINE   [facility]     Referring Provider: Pedro Priest MD  Reason for Consultation:  Stat consult for shortness of breath  Chief complaint:  Shortness of breath and shaking    Subjective .     History of present illness:    This is a 57-year-old female in relatively good health who presents to the hospital for elective total knee.  She underwent a left total knee replacement today and tolerated the surgery well.  Postoperatively she was having an uneventful course until earlier this evening she developed some shortness of breath.  She come out of surgery without any difficulty and was resting in the room awaiting dinner to arrival when she began shaking and became short of breath.  The nurse evaluated her and called her rapid response.  The team came and evaluated the patient gave her breathing treatment and her symptoms improved.  About 40 minutes later she had another episode with shortness of breath and shaking.  This resolved on its own without difficulty.  A stat response was called again.  We were consulted to evaluate the patient for these shaking episodes with shortness of breath.  She's presently resting symptom free heart rate and blood pressure within normal limits.  She was never hypoxic never had any wheezing or nursing evaluation.  She denied any chest pain palpitations or abdominal pain or other symptoms.  Review of Systems  Review of Systems - History obtained from the patient  General ROS: negative for - chills, fatigue or fever  Psychological ROS: negative for - behavioral disorder, depression or sleep disturbances  Ophthalmic ROS: negative for - blurry vision, decreased vision or double vision  ENT ROS: negative for - headaches, hearing change or nasal congestion  Allergy and Immunology ROS: negative for - nasal congestion, postnasal drip or seasonal allergies  Hematological and Lymphatic  ROS: negative for - bleeding problems, blood clots or blood transfusions  Endocrine ROS: negative for - palpitations, polydipsia/polyuria or skin changes  Respiratory ROS: positive for - shortness of breath  negative for - cough or sputum changes  Cardiovascular ROS: no chest pain or dyspnea on exertion  Gastrointestinal ROS: no abdominal pain, change in bowel habits, or black or bloody stools  Genito-Urinary ROS: no dysuria, trouble voiding, or hematuria  Musculoskeletal ROS: negative for - muscle pain or muscular weakness  Neurological ROS: no TIA or stroke symptoms  Dermatological ROS: negative for rash and skin lesion changes      Past Medical History:   Diagnosis Date   • Anxiety    • Arthritis    • High cholesterol    • History of skin cancer    • Migraine    • Vitamin D deficiency      Past Surgical History:   Procedure Laterality Date   •  SECTION      X 3   • CHOLECYSTECTOMY     • HYSTERECTOMY       Family History   Problem Relation Age of Onset   • Malig Hyperthermia Neg Hx      Social History   Substance Use Topics   • Smoking status: Never Smoker   • Smokeless tobacco: Never Used   • Alcohol use Yes      Comment: RARELY     Prescriptions Prior to Admission   Medication Sig Dispense Refill Last Dose   • aspirin-acetaminophen-caffeine (EXCEDRIN MIGRAINE) 250-250-65 MG per tablet Take 1 tablet by mouth Every 6 (Six) Hours As Needed for Headache.   Past Month at Unknown time   • chlorhexidine (HIBICLENS) 4 % external liquid Apply  topically 1 (One) Time.   2017 at 0500   • fexofenadine (ALLEGRA) 180 MG tablet Take 180 mg by mouth Daily.   Past Month at Unknown time   • mupirocin (BACTROBAN) 2 % ointment Apply 1 application topically 1 (One) Time.   2017 at 0630   • urea (CARMOL) 40 % cream Apply 1 application topically Daily As Needed for Dry Skin (DRY FEET).   Past Week at Unknown time   • atorvastatin (LIPITOR) 10 MG tablet Take 10 mg by mouth Every Night.   2017 at 2130   •  "escitalopram (LEXAPRO) 10 MG tablet Take 10 mg by mouth Daily.   8/21/2017 at 1000   • estradiol (ESTRACE) 1 MG tablet Take 1 mg by mouth Daily.   8/21/2017 at 1000   • ibuprofen (ADVIL,MOTRIN) 200 MG tablet Take 400 mg by mouth Every 6 (Six) Hours As Needed for Mild Pain (1-3). PT TO HOLD MED PRIOR TO OR   8/8/2017 at 1000   • meloxicam (MOBIC) 7.5 MG tablet Take 7.5 mg by mouth 2 (Two) Times a Day.   8/8/2017 at 1000   • naproxen sodium (ALEVE) 220 MG tablet Take 220 mg by mouth 2 (Two) Times a Day As Needed for Mild Pain (1-3). PT TO HOLD MED PRIOR TO OR   8/8/2017 at 1000   • traMADol (ULTRAM) 50 MG tablet Take 50 mg by mouth As Needed for Moderate Pain (4-6).   8/8/2017 at 1000   • vitamin B-12 (CYANOCOBALAMIN) 1000 MCG tablet Take 1,000 mcg by mouth Daily.   8/15/2017 at 1000   • VITAMIN D, CHOLECALCIFEROL, PO Take 1.25 mg by mouth 2 (Two) Times a Week. MONDAYS AND THURSDAYS   8/15/2017 at 1000   • zolpidem (AMBIEN) 10 MG tablet Take 10 mg by mouth Every Night.   8/21/2017 at 2230       aspirin 325 mg Oral BID With Meals   atorvastatin 10 mg Oral Nightly   cetirizine 10 mg Oral Daily   clindamycin 900 mg Intravenous Q8H   escitalopram 10 mg Oral Daily   estradiol 1 mg Oral Daily   [START ON 8/23/2017] ferrous sulfate 325 mg Oral Daily With Breakfast   mupirocin  Each Nare BID   sennosides-docusate sodium 2 tablet Oral BID   vitamin B-12 1,000 mcg Oral Daily   zolpidem 10 mg Oral Nightly     Allergies:   Celecoxib    Objective     Vital Signs   Temp:  [97.7 °F (36.5 °C)-98.2 °F (36.8 °C)] 97.7 °F (36.5 °C)  Heart Rate:  [] 102  Resp:  [12-24] 22  BP: (116-144)/() 137/76    Intake/Output Summary (Last 24 hours) at 08/22/17 2035  Last data filed at 08/22/17 1900   Gross per 24 hour   Intake             1200 ml   Output              450 ml   Net              750 ml     Flowsheet Rows         First Filed Value    Admission Height  65\" (165.1 cm) Documented at 08/22/2017 0801    Admission Weight  155 lb " (70.3 kg) Documented at 08/22/2017 0801          Physical Exam:     General Appearance:    Alert, cooperative, in no acute distress   Head:    Normocephalic, without obvious abnormality, atraumatic   Eyes:            Lids and lashes normal, conjunctivae and sclerae normal, no   icterus, no pallor, corneas clear, PERRLA   Ears:    Ears appear intact with no abnormalities noted   Throat:   No oral lesions, no thrush, oral mucosa moist   Neck:   No adenopathy, supple, trachea midline, no thyromegaly, no   carotid bruit, no JVD   Back:     No kyphosis present, no scoliosis present, no skin lesions,      erythema or scars, no tenderness to percussion or                   palpation,   range of motion normal   Lungs:     Clear to auscultation,respirations regular, even and                  unlabored    Heart:    Regular rhythm and normal rate, normal S1 and S2, no            murmur, no gallop, no rub, no click   Chest Wall:    No abnormalities observed   Abdomen:     Normal bowel sounds, no masses, no organomegaly, soft        non-tender, non-distended, no guarding, no rebound                tenderness   Rectal:     Deferred   Extremities:   Moves all extremities well, no edema, no cyanosis, no             rednessLeft leg is in a Ace wrap with ice around the knee there is a drain in place with some serous segment is drainage in the back.     Pulses:   Pulses palpable and equal bilaterally   Skin:   No bleeding, bruising or rash   Lymph nodes:   No palpable adenopathy   Neurologic:   Cranial nerves 2 - 12 grossly intact, sensation intact, DTR       present and equal bilaterally         Results Review:   I reviewed the patient's new clinical results.  I reviewed the patient's new imaging results and agree with the interpretation.  I reviewed the patient's other test results and agree with the interpretation    Results from last 7 days  Lab Units 08/22/17 2043   WBC 10*3/mm3 18.39*   HEMOGLOBIN g/dL 11.9   HEMATOCRIT % 37.5    PLATELETS 10*3/mm3 264       Results from last 7 days  Lab Units 08/22/17  2043   SODIUM mmol/L 138   POTASSIUM mmol/L 4.2   CHLORIDE mmol/L 99   CO2 mmol/L 19.3*   BUN mg/dL 13   CREATININE mg/dL 0.78   GLUCOSE mg/dL 168*   CALCIUM mg/dL 8.6     Assessment/Plan     Active Problems:    OA (osteoarthritis) of knee    Anxiety    Dyspnea    Leukocytosis    I think she probably had a panic attack.  This doesn't clint a primary lung problem or oxygen saturations in the middle lobe.  She was a little tachycardic during the event that her heart rate is normalized now.  She is presently symptom free and feeling much better.  This could just be a reaction to anesthesia pain and other issues in the postoperative setting.  We'll monitor overnight tonight if she has another one of these episodes are coming up to the bedside and evaluated her during the episode.  Otherwise will continue supportive treatment.  She does have a significant leukocytosis but this is probably reactive from surgery.  If it persists tomorrow and into the next day may need further evaluation.      I discussed the patients findings and my recommendations with patient, family and nursing staff    Thank you very much for allowing us to participate in your patient's care.    Jonny Soares MD  08/22/17  8:35 PM    Time: 45 mins

## 2017-08-23 NOTE — THERAPY TREATMENT NOTE
Acute Care - Physical Therapy Treatment Note  [facility]     Patient Name: Denisse Flores  : 1960  MRN: [id number]  Today's Date: 2017  Onset of Illness/Injury or Date of Surgery Date: 17          Admit Date: 2017    Visit Dx:    ICD-10-CM ICD-9-CM   1. Difficulty walking R26.2 719.7     Patient Active Problem List   Diagnosis   • OA (osteoarthritis) of knee   • Anxiety   • Dyspnea   • Leukocytosis               Adult Rehabilitation Note       17 0942          Rehab Assessment/Intervention    Discipline physical therapy assistant  -RW      Document Type therapy note (daily note)  -RW      Subjective Information agree to therapy  -RW      Patient Effort, Rehab Treatment good  -RW      Precautions/Limitations fall precautions  -RW      Recorded by [RW] Eri Fonseca PTA      Pain Assessment    Pain Assessment 0-10  -RW      Pain Score 4  -RW      Pain Type Acute pain;Surgical pain  -RW      Pain Location Knee  -RW      Pain Orientation Left  -RW      Pain Intervention(s) Medication (See MAR);Repositioned;Ambulation/increased activity;Cold applied  -RW      Response to Interventions tolerated  -RW      Recorded by [RW] Eri Fonseca PTA      Cognitive Assessment/Intervention    Current Cognitive/Communication Assessment functional  -RW      Orientation Status oriented x 4  -RW      Follows Commands/Answers Questions 100% of the time  -RW      Personal Safety WNL/WFL  -RW      Personal Safety Interventions fall prevention program maintained;gait belt;nonskid shoes/slippers when out of bed  -RW      Recorded by [RW] Eri Fonseca PTA      ROM (Range of Motion)    General ROM Detail L knee 8-84'  -RW      Recorded by [RW] Eri Fonseca PTA      Bed Mobility, Assessment/Treatment    Bed Mobility, Assistive Device bed rails;head of bed elevated  -RW      Bed Mob, Supine to Sit, [place] not tested   Pt up in chair  -RW      Bed Mob, Sit to Supine, [place] contact guard  assist;verbal cues required  -RW      Recorded by [RW] Eri Fonseca PTA      Transfer Assessment/Treatment    Transfers, Sit-Stand [place] contact guard assist;stand by assist  -RW      Transfers, Stand-Sit [place] stand by assist  -RW      Transfers, Sit-Stand-Sit, Assist Device rolling walker  -RW      Recorded by [RW] Eri Fonseca PTA      Gait Assessment/Treatment    Gait, [place] Level stand by assist;verbal cues required  -RW      Gait, Assistive Device rolling walker  -RW      Gait, Distance (Feet) 100  -RW      Gait, Gait Pattern Analysis swing-through gait  -RW      Gait, Gait Deviations forward flexed posture;left:;antalgic;tre decreased  -RW      Recorded by [RW] Eri Fonseca PTA      Therapy Exercises    Exercise Protocols total knee  -RW      Total Knee Exercises left:;15 reps;completed protocol  -RW      Recorded by [RW] Eri Fonseca PTA      Positioning and Restraints    Pre-Treatment Position sitting in chair/recliner  -RW      Post Treatment Position bed  -RW      In Bed fowlers;call light within reach;encouraged to call for assist   ice applied to L knee  -RW      Recorded by [RW] Eri Fonseca PTA        User Key  (r) = Recorded By, (t) = Taken By, (c) = Cosigned By    Initials Name Effective Dates     Eri Fonseca PTA 04/06/16 -                 IP PT Goals       08/22/17 1604          Transfer Training PT LTG    Transfer Training PT LTG, Time to Achieve 3 days  -CH      Transfer Training PT LTG, Activity Type all transfers  -CH      Transfer Training PT LTG, [place] Level supervision required  -CH      Transfer Training PT LTG, Assist Device walker, rolling  -CH      Gait Training PT LTG    Gait Training Goal PT LTG, Time to Achieve 1 wk  -CH      Gait Training Goal PT LTG, [place] Level supervision required  -CH      Gait Training Goal PT LTG, Assist Device walker, rolling  -CH      Gait Training Goal PT LTG, Distance to Achieve 150  -CH       Range of Motion PT LTG    Range of Motion Goal PT LTG, Time to Achieve 3 days  -CH      Range fo Motion Goal PT LTG, Joint L knee  -CH      Range of Motion Goal PT LTG, AROM Measure 0-90  -CH        User Key  (r) = Recorded By, (t) = Taken By, (c) = Cosigned By    Initials Name Provider Type    CH Stefania Lepe, PT Physical Therapist          Physical Therapy Education     Title: PT OT SLP Therapies (Done)     Topic: Physical Therapy (Done)     Point: Mobility training (Done)    Learning Progress Summary    Learner Readiness Method Response Comment Documented by Status   Patient Acceptance E,TB,D VU,NR   08/23/17 1148 Done    Acceptance E,TB,D VU,NR   08/22/17 1603 Done               Point: Home exercise program (Done)    Learning Progress Summary    Learner Readiness Method Response Comment Documented by Status   Patient Acceptance E,TB,D VU,NR   08/23/17 1148 Done    Acceptance E,TB,D VU,NR   08/22/17 1603 Done               Point: Body mechanics (Done)    Learning Progress Summary    Learner Readiness Method Response Comment Documented by Status   Patient Acceptance E,TB,D VU,[facility] 08/23/17 1148 Done    Acceptance E,TB,D VU,NR   08/22/17 1603 Done               Point: Precautions (Done)    Learning Progress Summary    Learner Readiness Method Response Comment Documented by Status   Patient Acceptance E,TB,D VU,NR   08/23/17 1148 Done    Acceptance E,TB,D VU,NR   08/22/17 1603 Done                      User Key     Initials Effective Dates Name Provider Type Discipline     12/01/15 -  Stefania Lepe, PT Physical Therapist PT     04/06/16 -  Eri Fonseca PTA Physical Therapy Assistant PT                    PT Recommendation and Plan  Anticipated Discharge Disposition: skilled nursing facility, home with home health (pt states she is interested in SNF because  works)  Planned Therapy Interventions: balance training, bed mobility training, gait training, home exercise program,  patient/family education, transfer training  PT Frequency: 2 times/day  Plan of Care Review  Plan Of Care Reviewed With: patient  Outcome Summary/Follow up Plan: Pt increased ambulation distance and exercise tolerance. Progressing well w/ PT.           Outcome Measures       08/23/17 0942 08/22/17 1600       How much help from another person do you currently need...    Turning from your back to your side while in flat bed without using bedrails? 3  -RW 3  -CH     Moving from lying on back to sitting on the side of a flat bed without bedrails? 3  -RW 3  -CH     Moving to and from a bed to a chair (including a wheelchair)? 3  -RW 3  -CH     Standing up from a chair using your arms (e.g., wheelchair, bedside chair)? 3  -RW 3  -CH     Climbing 3-5 steps with a railing? 3  -RW 2  -CH     To walk in hospital room? 3  -RW 2  -CH     AM-PAC 6 Clicks Score 18  -RW 16  -CH     Functional Assessment    Outcome Measure Options AM-PAC 6 Clicks Basic Mobility (PT)  -RW AM-PAC 6 Clicks Basic Mobility (PT)  -CH       User Key  (r) = Recorded By, (t) = Taken By, (c) = Cosigned By    Initials Name Provider Type     Stefania Lepe, PT Physical Therapist    RW Eri Fonseca PTA Physical Therapy Assistant           Time Calculation:         PT Charges       08/23/17 1145          Time Calculation    Start Time 0942  -RW      Stop Time 1015  -RW      Time Calculation (min) 33 min  -RW      PT Received On 08/23/17  -      PT - Next Appointment 08/23/17  -        User Key  (r) = Recorded By, (t) = Taken By, (c) = Cosigned By    Initials Name Provider Type     Eri Fonseca PTA Physical Therapy Assistant          Therapy Charges for Today     Code Description Service Date Service Provider Modifiers Qty    70006149368 HC PT THER PROC EA 15 MIN 8/23/2017 Eri Fonseca PTA GP 1    65342440327 HC PT THER PROC GROUP 8/23/2017 Eri Fonseca PTA GP 1          PT G-Codes  Outcome Measure Options: AM-PAC 6 Clicks Basic  Mobility (PT)    Eri Fonseca, PTA  8/23/2017

## 2017-08-23 NOTE — THERAPY TREATMENT NOTE
Acute Care - Physical Therapy Treatment Note  [facility]     Patient Name: Denisse Flores  : 1960  MRN: [id number]  Today's Date: 2017  Onset of Illness/Injury or Date of Surgery Date: 17          Admit Date: 2017    Visit Dx:    ICD-10-CM ICD-9-CM   1. Difficulty walking R26.2 719.7     Patient Active Problem List   Diagnosis   • OA (osteoarthritis) of knee   • Anxiety   • Dyspnea   • Leukocytosis               Adult Rehabilitation Note       17 1400 17 0942       Rehab Assessment/Intervention    Discipline physical therapy assistant  -RW physical therapy assistant  -RW     Document Type therapy note (daily note)  -RW therapy note (daily note)  -RW     Subjective Information agree to therapy;complains of;pain  -RW agree to therapy  -RW     Patient Effort, Rehab Treatment good  -RW good  -RW     Precautions/Limitations  fall precautions  -RW     Recorded by [RW] Eri Fonseca PTA [RW] Eri Fonseca PTA     Pain Assessment    Pain Assessment 0-10  -RW 0-10  -RW     Pain Score 7  -RW 4  -RW     Pain Type Acute pain;Surgical pain  -RW Acute pain;Surgical pain  -RW     Pain Location Knee  -RW Knee  -RW     Pain Orientation Left  -RW Left  -RW     Pain Intervention(s) Medication (See MAR);Cold applied;Repositioned;Ambulation/increased activity  -RW Medication (See MAR);Repositioned;Ambulation/increased activity;Cold applied  -RW     Response to Interventions tolerated  -RW tolerated  -RW     Recorded by [RW] Eri Fonseca PTA [RW] Eri Fonseca PTA     Cognitive Assessment/Intervention    Current Cognitive/Communication Assessment functional  -RW functional  -RW     Orientation Status oriented x 4  -RW oriented x 4  -RW     Follows Commands/Answers Questions 100% of the time  -% of the time  -RW     Personal Safety WNL/WFL  -RW WNL/WFL  -RW     Personal Safety Interventions fall prevention program maintained;gait belt;nonskid shoes/slippers when out of bed  -RW fall  prevention program maintained;gait belt;nonskid shoes/slippers when out of bed  -RW     Recorded by [RW] Eri Fonseca PTA [RW] Eri Fonseca PTA     ROM (Range of Motion)    General ROM Detail  L knee 8-84'  -RW     Recorded by  [RW] Eri Fonseca PTA     Bed Mobility, Assessment/Treatment    Bed Mobility, Assistive Device bed rails;head of bed elevated  -RW bed rails;head of bed elevated  -RW     Bed Mob, Supine to Sit, [place] not tested   Pt up in chair  -RW not tested   Pt up in chair  -RW     Bed Mob, Sit to Supine, [place] minimum assist (75% patient effort);verbal cues required   assist w/ LLE  -RW contact guard assist;verbal cues required  -RW     Recorded by [RW] Eri Fonseca PTA [RW] Eri Fonseca PTA     Transfer Assessment/Treatment    Transfers, Sit-Stand [place] stand by assist  -RW contact guard assist;stand by assist  -RW     Transfers, Stand-Sit [place] stand by assist  -RW stand by assist  -RW     Transfers, Sit-Stand-Sit, Assist Device rolling walker  -RW rolling walker  -RW     Recorded by [RW] Eri Fonseca PTA [RW] Eri Fonseca PTA     Gait Assessment/Treatment    Gait, [place] Level stand by assist;verbal cues required  -RW stand by assist;verbal cues required  -RW     Gait, Assistive Device rolling walker  -RW rolling walker  -RW     Gait, Distance (Feet) 100  -  -RW     Gait, Gait Pattern Analysis swing-through gait  -RW swing-through gait  -RW     Gait, Gait Deviations forward flexed posture;left:;antalgic;tre decreased  -RW forward flexed posture;left:;antalgic;tre decreased  -RW     Recorded by [RW] Eri Fonseca PTA [RW] Eri Fonseca PTA     Therapy Exercises    Exercise Protocols total knee  -RW total knee  -RW     Total Knee Exercises left:;20 reps;completed protocol  -RW left:;15 reps;completed protocol  -RW     Recorded by [RW] Eri Fonseca PTA [RW] Eri Fonseca PTA     Positioning and Restraints     Pre-Treatment Position sitting in chair/recliner  -RW sitting in chair/recliner  -RW     Post Treatment Position bed  -RW bed  -RW     In Bed fowlers;call light within reach;encouraged to call for assist   ice applied to L knee  -RW fowlers;call light within reach;encouraged to call for assist   ice applied to L knee  -RW     Recorded by [RW] Eri Fonseca PTA [RW] Eri Fonseca PTA       User Key  (r) = Recorded By, (t) = Taken By, (c) = Cosigned By    Initials Name Effective Dates    RW Eri Fonseac PTA 04/06/16 -                 IP PT Goals       08/22/17 1604          Transfer Training PT LTG    Transfer Training PT LTG, Time to Achieve 3 days  -CH      Transfer Training PT LTG, Activity Type all transfers  -CH      Transfer Training PT LTG, [place] Level supervision required  -CH      Transfer Training PT LTG, Assist Device walker, rolling  -CH      Gait Training PT LTG    Gait Training Goal PT LTG, Time to Achieve 1 wk  -CH      Gait Training Goal PT LTG, [place] Level supervision required  -CH      Gait Training Goal PT LTG, Assist Device walker, rolling  -CH      Gait Training Goal PT LTG, Distance to Achieve 150  -CH      Range of Motion PT LTG    Range of Motion Goal PT LTG, Time to Achieve 3 days  -CH      Range fo Motion Goal PT LTG, Joint L knee  -CH      Range of Motion Goal PT LTG, AROM Measure 0-90  -CH        User Key  (r) = Recorded By, (t) = Taken By, (c) = Cosigned By    Initials Name Provider Type     Stefania Lepe, PT Physical Therapist          Physical Therapy Education     Title: PT OT SLP Therapies (Done)     Topic: Physical Therapy (Done)     Point: Mobility training (Done)    Learning Progress Summary    Learner Readiness Method Response Comment Documented by Status   Patient Acceptance BEENA TRIPATHI D VU, NR  RW 08/23/17 1148 Done    Acceptance BEENA TRIPATHI D VU, NR   08/22/17 1603 Done               Point: Home exercise program (Done)    Learning Progress Summary    Learner  Readiness Method Response Comment Documented by Status   Patient Acceptance E,TB,D VU,NR   08/23/17 1148 Done    Acceptance E,TB,D VU,NR   08/22/17 1603 Done               Point: Body mechanics (Done)    Learning Progress Summary    Learner Readiness Method Response Comment Documented by Status   Patient Acceptance E,TB,D VU,NR   08/23/17 1148 Done    Acceptance E,TB,D VU,NR   08/22/17 1603 Done               Point: Precautions (Done)    Learning Progress Summary    Learner Readiness Method Response Comment Documented by Status   Patient Acceptance E,TB,D VU,NR   08/23/17 1148 Done    Acceptance E,TB,D VU,NR   08/22/17 1603 Done                      User Key     Initials Effective Dates Name Provider Type Discipline     12/01/15 -  Stefania Lepe, PT Physical Therapist PT     04/06/16 -  Eri Fonseca, PTA Physical Therapy Assistant PT                    PT Recommendation and Plan  Anticipated Discharge Disposition: skilled nursing facility, home with home health (pt states she is interested in SNF because  works)  Planned Therapy Interventions: balance training, bed mobility training, gait training, home exercise program, patient/family education, transfer training  PT Frequency: 2 times/day  Plan of Care Review  Plan Of Care Reviewed With: patient  Outcome Summary/Follow up Plan: Pt increased ambulation distance and exercise tolerance. Progressing well w/ PT.           Outcome Measures       08/23/17 0942 08/22/17 1600       How much help from another person do you currently need...    Turning from your back to your side while in flat bed without using bedrails? 3  -RW 3  -CH     Moving from lying on back to sitting on the side of a flat bed without bedrails? 3  -RW 3  -CH     Moving to and from a bed to a chair (including a wheelchair)? 3  -RW 3  -CH     Standing up from a chair using your arms (e.g., wheelchair, bedside chair)? 3  -RW 3  -CH     Climbing 3-5 steps with a railing? 3   -RW 2  -CH     To walk in hospital room? 3  -RW 2  -CH     AM-PAC 6 Clicks Score 18  -RW 16  -CH     Functional Assessment    Outcome Measure Options AM-PAC 6 Clicks Basic Mobility (PT)  -RW AM-PAC 6 Clicks Basic Mobility (PT)  -CH       User Key  (r) = Recorded By, (t) = Taken By, (c) = Cosigned By    Initials Name Provider Type     Stefania Lepe, PT Physical Therapist    RW Eri Fonseca PTA Physical Therapy Assistant           Time Calculation:         PT Charges       08/23/17 1407 08/23/17 1145       Time Calculation    Start Time 1403  -RW 0942  -RW     Stop Time 1445  -RW 1015  -RW     Time Calculation (min) 42 min  -RW 33 min  -RW     PT Received On 08/23/17  -RW 08/23/17  -     PT - Next Appointment 08/24/17  - 08/23/17  -RW       User Key  (r) = Recorded By, (t) = Taken By, (c) = Cosigned By    Initials Name Provider Type     Eri Fonseca PTA Physical Therapy Assistant          Therapy Charges for Today     Code Description Service Date Service Provider Modifiers Qty    95588496153 HC PT THER PROC EA 15 MIN 8/23/2017 Eri Fonseca PTA GP 1    98889378333 HC PT THER PROC GROUP 8/23/2017 Eri Fonseca PTA GP 1    60714671584 HC PT THER PROC EA 15 MIN 8/23/2017 Eri Fonseca PTA GP 1    57627389573 HC PT THER PROC GROUP 8/23/2017 Eri Fonseca PTA GP 1          PT G-Codes  Outcome Measure Options: AM-PAC 6 Clicks Basic Mobility (PT)    Eri Fonseca PTA  8/23/2017

## 2017-08-24 LAB
DEPRECATED RDW RBC AUTO: 50.5 FL (ref 37–54)
ERYTHROCYTE [DISTWIDTH] IN BLOOD BY AUTOMATED COUNT: 13.7 % (ref 11.7–13)
HCT VFR BLD AUTO: 33.6 % (ref 35.6–45.5)
HGB BLD-MCNC: 10.9 G/DL (ref 11.9–15.5)
MCH RBC QN AUTO: 32.6 PG (ref 26.9–32)
MCHC RBC AUTO-ENTMCNC: 32.4 G/DL (ref 32.4–36.3)
MCV RBC AUTO: 100.6 FL (ref 80.5–98.2)
PLATELET # BLD AUTO: 250 10*3/MM3 (ref 140–500)
PMV BLD AUTO: 10.4 FL (ref 6–12)
RBC # BLD AUTO: 3.34 10*6/MM3 (ref 3.9–5.2)
WBC NRBC COR # BLD: 15.74 10*3/MM3 (ref 4.5–10.7)

## 2017-08-24 PROCEDURE — 85027 COMPLETE CBC AUTOMATED: CPT | Performed by: INTERNAL MEDICINE

## 2017-08-24 PROCEDURE — 97110 THERAPEUTIC EXERCISES: CPT

## 2017-08-24 PROCEDURE — 97150 GROUP THERAPEUTIC PROCEDURES: CPT

## 2017-08-24 RX ADMIN — HYDROCODONE BITARTRATE AND ACETAMINOPHEN 2 TABLET: 10; 325 TABLET ORAL at 13:43

## 2017-08-24 RX ADMIN — CETIRIZINE HYDROCHLORIDE 10 MG: 10 TABLET, FILM COATED ORAL at 08:00

## 2017-08-24 RX ADMIN — DOCUSATE SODIUM -SENNOSIDES 2 TABLET: 50; 8.6 TABLET, COATED ORAL at 18:04

## 2017-08-24 RX ADMIN — HYDROCODONE BITARTRATE AND ACETAMINOPHEN 2 TABLET: 10; 325 TABLET ORAL at 01:52

## 2017-08-24 RX ADMIN — ASPIRIN 325 MG: 325 TABLET, DELAYED RELEASE ORAL at 08:00

## 2017-08-24 RX ADMIN — ATORVASTATIN CALCIUM 10 MG: 10 TABLET, FILM COATED ORAL at 20:56

## 2017-08-24 RX ADMIN — HYDROCODONE BITARTRATE AND ACETAMINOPHEN 2 TABLET: 10; 325 TABLET ORAL at 18:05

## 2017-08-24 RX ADMIN — POLYETHYLENE GLYCOL 3350 17 G: 17 POWDER, FOR SOLUTION ORAL at 07:58

## 2017-08-24 RX ADMIN — HYDROCODONE BITARTRATE AND ACETAMINOPHEN 2 TABLET: 10; 325 TABLET ORAL at 06:05

## 2017-08-24 RX ADMIN — CYANOCOBALAMIN TAB 500 MCG 1000 MCG: 500 TAB at 08:00

## 2017-08-24 RX ADMIN — ZOLPIDEM TARTRATE 10 MG: 5 TABLET, FILM COATED ORAL at 22:05

## 2017-08-24 RX ADMIN — HYDROCODONE BITARTRATE AND ACETAMINOPHEN 2 TABLET: 10; 325 TABLET ORAL at 10:00

## 2017-08-24 RX ADMIN — ASPIRIN 325 MG: 325 TABLET, DELAYED RELEASE ORAL at 18:04

## 2017-08-24 RX ADMIN — DOCUSATE SODIUM -SENNOSIDES 2 TABLET: 50; 8.6 TABLET, COATED ORAL at 08:00

## 2017-08-24 RX ADMIN — MUPIROCIN: 20 OINTMENT TOPICAL at 08:02

## 2017-08-24 RX ADMIN — ESCITALOPRAM 10 MG: 10 TABLET, FILM COATED ORAL at 08:00

## 2017-08-24 RX ADMIN — HYDROCODONE BITARTRATE AND ACETAMINOPHEN 2 TABLET: 10; 325 TABLET ORAL at 22:05

## 2017-08-24 RX ADMIN — FERROUS SULFATE TAB 325 MG (65 MG ELEMENTAL FE) 325 MG: 325 (65 FE) TAB at 08:00

## 2017-08-24 RX ADMIN — ESTRADIOL 1 MG: 1 TABLET ORAL at 08:00

## 2017-08-24 NOTE — THERAPY TREATMENT NOTE
Acute Care - Physical Therapy Treatment Note  [facility]     Patient Name: Denisse Flores  : 1960  MRN: [id number]  Today's Date: 2017  Onset of Illness/Injury or Date of Surgery Date: 17          Admit Date: 2017    Visit Dx:    ICD-10-CM ICD-9-CM   1. Difficulty walking R26.2 719.7     Patient Active Problem List   Diagnosis   • OA (osteoarthritis) of knee   • Anxiety   • Dyspnea   • Leukocytosis   • Insomnia               Adult Rehabilitation Note       17 1535 17 1246 17 1400    Rehab Assessment/Intervention    Discipline physical therapist  -MA physical therapist  -MA physical therapy assistant  -RW    Document Type therapy note (daily note)  -MA therapy note (daily note)  -MA therapy note (daily note)  -RW    Subjective Information agree to therapy;complains of;pain  -MA agree to therapy;complains of;pain  -MA agree to therapy;complains of;pain  -RW    Patient Effort, Rehab Treatment good  -MA good  -MA good  -RW    Symptoms Noted During/After Treatment increased pain  -MA increased pain;dizziness  -MA     Precautions/Limitations fall precautions  -MA fall precautions  -MA     Recorded by [MA] Charmaine Espinoza, PT [MA] Charmaine Espinoza, PT [RW] Eri Fonseca, [facility]    Pain Assessment    Pain Assessment 0-10  -MA 0-10  -MA 0-10  -RW    Pain Score 8  -MA 4  -MA 7  -RW    Pain Type Acute pain;Surgical pain  -MA Acute pain;Surgical pain  -MA Acute pain;Surgical pain  -RW    Pain Location Knee  -MA Knee  -MA Knee  -RW    Pain Orientation Left  -MA Left  -MA Left  -RW    Pain Intervention(s) Cold applied;Ambulation/increased activity;Elevated;Rest  -MA Cold applied;Repositioned;Ambulation/increased activity;Rest  -MA Medication (See MAR);Cold applied;Repositioned;Ambulation/increased activity  -RW    Response to Interventions tolerance improved with PM session  -MA tolerated fairly  -MA tolerated  -RW    Recorded by [MA] Charmaine Espinoza, PT [MA] Charmaine Espinoza,  PT [RW] Eri Fonseca, RIAZ    Vision Assessment/Intervention    Visual Impairment WFL  -MA WFL  -MA     Recorded by [MA] Charmaine Espinoza, PT [MA] Charmaine Espinoza PT     Cognitive Assessment/Intervention    Current Cognitive/Communication Assessment functional  -MA functional  -MA functional  -RW    Orientation Status oriented x 4  -MA oriented x 4  -MA oriented x 4  -RW    Follows Commands/Answers Questions 100% of the time;able to follow multi-step instructions;needs increased time  -% of the time;able to follow multi-step instructions  -% of the time  -RW    Personal Safety WNL/WFL  -MA WNL/WFL  -MA WNL/WFL  -RW    Personal Safety Interventions fall prevention program maintained;gait belt;nonskid shoes/slippers when out of bed  -MA fall prevention program maintained;gait belt;nonskid shoes/slippers when out of bed  -MA fall prevention program maintained;gait belt;nonskid shoes/slippers when out of bed  -RW    Recorded by [MA] Charmaine Espinoza, PT [MA] Charmaine Espinoza, PT [RW] Eri Fonseca, PTA    ROM (Range of Motion)    General ROM Detail  L knee 12-84'  -MA     Recorded by  [MA] Charmaine Espinoza PT     Bed Mobility, Assessment/Treatment    Bed Mobility, Assistive Device   bed rails;head of bed elevated  -RW    Bed Mob, Supine to Sit, [place]   not tested   Pt up in chair  -RW    Bed Mob, Sit to Supine, [place]   minimum assist (75% patient effort);verbal cues required   assist w/ LLE  -RW    Bed Mobility, Comment Patient up in chair  -MA Patient up in chair  -MA     Recorded by [MA] Charmaine Espinoza, PT [MA] Charmaine Espinoza, PT [RW] Eri Fonseca, RIAZ    Transfer Assessment/Treatment    Transfers, Sit-Stand [place] stand by assist  -MA stand by assist;verbal cues required  -MA stand by assist  -RW    Transfers, Stand-Sit [place] stand by assist  -MA stand by assist  -MA stand by assist  -RW    Transfers, Sit-Stand-Sit, Assist Device rolling walker  -MA  rolling walker  -MA rolling walker  -RW    Transfer, Comment VC for pushing from chair for sit<>stand  -MA VC for posture  -MA     Recorded by [MA] Charmaine Espinoza, PT [MA] Charmaine Espinoza, PT [RW] Eri Fonseca PTA    Gait Assessment/Treatment    Gait, [place] Level stand by assist;verbal cues required  -MA stand by assist;verbal cues required  -MA stand by assist;verbal cues required  -RW    Gait, Assistive Device rolling walker  -MA rolling walker  -MA rolling walker  -RW    Gait, Distance (Feet) 75  -MA 50  -  -RW    Gait, Gait Pattern Analysis swing-through gait  -MA swing-through gait  -MA swing-through gait  -RW    Gait, Gait Deviations left:;antalgic;bilateral:;tre decreased;step length decreased  -MA left:;antalgic;bilateral:;tre decreased;decreased heel strike;step length decreased  -MA forward flexed posture;left:;antalgic;tre decreased  -RW    Gait, Comment  Episodes of dizziness noted and unable to further ambulation due to increased pain  -MA     Recorded by [MA] Charmaine Espinoza, PT [MA] Charmaine Espinoza, PT [RW] Eri Fonseca PTA    Therapy Exercises    Exercise Protocols total knee  -MA total knee  -MA total knee  -RW    Total Knee Exercises left:;30 reps;completed protocol   assistance required with SAQ and SLR  -MA left:;25 reps;completed protocol   required assistance for SLR  -MA left:;20 reps;completed protocol  -RW    Recorded by [MA] Charmaine Espinoza, PT [MA] Charmaine Espinoza, PT [RW] Eri Fonseca PTA    Positioning and Restraints    Pre-Treatment Position sitting in chair/recliner  -MA sitting in chair/recliner  -MA sitting in chair/recliner  -RW    Post Treatment Position chair  -MA chair  -MA bed  -RW    In Bed   fowlers;call light within reach;encouraged to call for assist   ice applied to L knee  -RW    In Chair reclined;call light within reach;encouraged to call for assist;legs elevated  -MA sitting;call light within reach;encouraged to  call for assist;legs elevated   ice pack over L knee  -MA     Recorded by [MA] Charmaine Espinoza, PT [MA] Charmaine Espinoza, PT [RW] Eri Fonseca PTA      08/23/17 0942          Rehab Assessment/Intervention    Discipline physical therapy assistant  -RW      Document Type therapy note (daily note)  -RW      Subjective Information agree to therapy  -RW      Patient Effort, Rehab Treatment good  -RW      Precautions/Limitations fall precautions  -RW      Recorded by [RW] Eri Fonseca PTA      Pain Assessment    Pain Assessment 0-10  -RW      Pain Score 4  -RW      Pain Type Acute pain;Surgical pain  -RW      Pain Location Knee  -RW      Pain Orientation Left  -RW      Pain Intervention(s) Medication (See MAR);Repositioned;Ambulation/increased activity;Cold applied  -RW      Response to Interventions tolerated  -RW      Recorded by [RW] Eri Fonseca PTA      Cognitive Assessment/Intervention    Current Cognitive/Communication Assessment functional  -RW      Orientation Status oriented x 4  -RW      Follows Commands/Answers Questions 100% of the time  -RW      Personal Safety WNL/WFL  -RW      Personal Safety Interventions fall prevention program maintained;gait belt;nonskid shoes/slippers when out of bed  -RW      Recorded by [RW] Eri Fonseca PTA      ROM (Range of Motion)    General ROM Detail L knee 8-84'  -RW      Recorded by [RW] Eri Fonseca PTA      Bed Mobility, Assessment/Treatment    Bed Mobility, Assistive Device bed rails;head of bed elevated  -RW      Bed Mob, Supine to Sit, [place] not tested   Pt up in chair  -RW      Bed Mob, Sit to Supine, [place] contact guard assist;verbal cues required  -RW      Recorded by [RW] Eri Fonseca PTA      Transfer Assessment/Treatment    Transfers, Sit-Stand [place] contact guard assist;stand by assist  -RW      Transfers, Stand-Sit [place] stand by assist  -RW      Transfers, Sit-Stand-Sit, Assist Device rolling walker  -RW       Recorded by [RW] Eri Fonseca PTA      Gait Assessment/Treatment    Gait, [place] Level stand by assist;verbal cues required  -RW      Gait, Assistive Device rolling walker  -RW      Gait, Distance (Feet) 100  -RW      Gait, Gait Pattern Analysis swing-through gait  -RW      Gait, Gait Deviations forward flexed posture;left:;antalgic;tre decreased  -RW      Recorded by [RW] Eri Fonseca PTA      Therapy Exercises    Exercise Protocols total knee  -RW      Total Knee Exercises left:;15 reps;completed protocol  -RW      Recorded by [RW] Eri Fonseca PTA      Positioning and Restraints    Pre-Treatment Position sitting in chair/recliner  -RW      Post Treatment Position bed  -RW      In Bed fowlers;call light within reach;encouraged to call for assist   ice applied to L knee  -RW      Recorded by [RW] Eri Fonseca PTA        User Key  (r) = Recorded By, (t) = Taken By, (c) = Cosigned By    Initials Name Effective Dates    RW Eri Fonesca, PTA 04/06/16 -     MA Charmaine Espinoza, PT 12/13/16 -                 IP PT Goals       08/22/17 1604          Transfer Training PT LTG    Transfer Training PT LTG, Time to Achieve 3 days  -CH      Transfer Training PT LTG, Activity Type all transfers  -CH      Transfer Training PT LTG, [place] Level supervision required  -CH      Transfer Training PT LTG, Assist Device walker, rolling  -CH      Gait Training PT LTG    Gait Training Goal PT LTG, Time to Achieve 1 wk  -CH      Gait Training Goal PT LTG, [place] Level supervision required  -CH      Gait Training Goal PT LTG, Assist Device walker, rolling  -CH      Gait Training Goal PT LTG, Distance to Achieve 150  -CH      Range of Motion PT LTG    Range of Motion Goal PT LTG, Time to Achieve 3 days  -CH      Range fo Motion Goal PT LTG, Joint L knee  -CH      Range of Motion Goal PT LTG, AROM Measure 0-90  -CH        User Key  (r) = Recorded By, (t) = Taken By, (c) = Cosigned By    Initials  Name Provider Type     Stefania Lepe, PT Physical Therapist          Physical Therapy Education     Title: PT OT SLP Therapies (Done)     Topic: Physical Therapy (Done)     Point: Mobility training (Done)    Learning Progress Summary    Learner Readiness Method Response Comment Documented by Status   Patient Acceptance E VU  MA 08/24/17 1249 Done    Acceptance E,TB,D VU,NR   08/23/17 1148 Done    Acceptance E,TB,D VU,NR   08/22/17 1603 Done               Point: Home exercise program (Done)    Learning Progress Summary    Learner Readiness Method Response Comment Documented by Status   Patient Acceptance E VU  MA 08/24/17 1249 Done    Acceptance E,TB,D VU,NR   08/23/17 1148 Done    Acceptance E,TB,D VU,NR   08/22/17 1603 Done               Point: Body mechanics (Done)    Learning Progress Summary    Learner Readiness Method Response Comment Documented by Status   Patient Acceptance E VU  MA 08/24/17 1249 Done    Acceptance E,TB,D VU,NR   08/23/17 1148 Done    Acceptance E,TB,D VU,NR   08/22/17 1603 Done               Point: Precautions (Done)    Learning Progress Summary    Learner Readiness Method Response Comment Documented by Status   Patient Acceptance E VU  MA 08/24/17 1249 Done    Acceptance E,TB,D VU,[facility] 08/23/17 1148 Done    Acceptance E,TB,D VU,[facility] 08/22/17 1603 Done                      User Key     Initials Effective Dates Name Provider Type Discipline     12/01/15 -  Stefania Lepe, PT Physical Therapist PT     04/06/16 -  Eri Fonseca, PTA Physical Therapy Assistant PT    MA 12/13/16 -  Charmaine Espinoza, PT Physical Therapist PT                    PT Recommendation and Plan  Anticipated Discharge Disposition: skilled nursing facility, home with home health (pt states she is interested in SNF because  works)  Planned Therapy Interventions: balance training, bed mobility training, gait training, home exercise program, patient/family education, transfer  training  PT Frequency: 2 times/day  Plan of Care Review  Plan Of Care Reviewed With: patient  Outcome Summary/Follow up Plan: Episodes of pain and dizziness noted this date. Tolerated exercises and ambulation fairly. Ambulaiton limited due to pain. Will continue to address defiicits and progress as appropriate. RW ordered for patient.          Outcome Measures       08/24/17 1200 08/23/17 0942 08/22/17 1600    How much help from another person do you currently need...    Turning from your back to your side while in flat bed without using bedrails? 3  -MA 3  -RW 3  -CH    Moving from lying on back to sitting on the side of a flat bed without bedrails? 3  -MA 3  -RW 3  -CH    Moving to and from a bed to a chair (including a wheelchair)? 3  -MA 3  -RW 3  -CH    Standing up from a chair using your arms (e.g., wheelchair, bedside chair)? 3  -MA 3  -RW 3  -CH    Climbing 3-5 steps with a railing? 3  -MA 3  -RW 2  -CH    To walk in hospital room? 3  -MA 3  -RW 2  -CH    AM-PAC 6 Clicks Score 18  -MA 18  -RW 16  -CH    Functional Assessment    Outcome Measure Options AM-PAC 6 Clicks Basic Mobility (PT)  -MA AM-PAC 6 Clicks Basic Mobility (PT)  -RW AM-PAC 6 Clicks Basic Mobility (PT)  -CH      User Key  (r) = Recorded By, (t) = Taken By, (c) = Cosigned By    Initials Name Provider Type    PATRICIA Lepe, PT Physical Therapist    KENIA Fonseca, PTA Physical Therapy Assistant    ROBER Espinoza, PT Physical Therapist           Time Calculation:         PT Charges       08/24/17 1538 08/24/17 1251       Time Calculation    Start Time 1400  -MA 1010  -MA     Stop Time 1450  -MA 1050  -MA     Time Calculation (min) 50 min  -MA 40 min  -MA     PT Received On 08/24/17  -MA 08/24/17  -MA     PT - Next Appointment 08/25/17  -MA 08/24/17  -MA       User Key  (r) = Recorded By, (t) = Taken By, (c) = Cosigned By    Initials Name Provider Type    ROBER Espinoza, PT Physical Therapist          Therapy Charges for  Today     Code Description Service Date Service Provider Modifiers Qty    89617825665 HC PT THER PROC GROUP 8/24/2017 Charmaine Espinoza, PT GP 1    51169346650 HC PT THER PROC EA 15 MIN 8/24/2017 Charmaine Espinoza, PT GP 1    43374556592 HC PT THER PROC GROUP 8/24/2017 Charmaine Espinoza, PT GP 1    36557111842 HC PT THER PROC EA 15 MIN 8/24/2017 Charmaine Espinoza, PT GP 1          PT G-Codes  Outcome Measure Options: AM-PAC 6 Clicks Basic Mobility (PT)    Charmaine Espinoza, PT  8/24/2017

## 2017-08-24 NOTE — THERAPY TREATMENT NOTE
Acute Care - Physical Therapy Treatment Note  [facility]     Patient Name: Denisse Flores  : 1960  MRN: [id number]  Today's Date: 2017  Onset of Illness/Injury or Date of Surgery Date: 17          Admit Date: 2017    Visit Dx:    ICD-10-CM ICD-9-CM   1. Difficulty walking R26.2 719.7     Patient Active Problem List   Diagnosis   • OA (osteoarthritis) of knee   • Anxiety   • Dyspnea   • Leukocytosis   • Insomnia               Adult Rehabilitation Note       17 1246 17 1400 17 0942    Rehab Assessment/Intervention    Discipline physical therapist  -MA physical therapy assistant  -RW physical therapy assistant  -RW    Document Type therapy note (daily note)  -MA therapy note (daily note)  -RW therapy note (daily note)  -RW    Subjective Information agree to therapy;complains of;pain  -MA agree to therapy;complains of;pain  -RW agree to therapy  -RW    Patient Effort, Rehab Treatment good  -MA good  -RW good  -RW    Symptoms Noted During/After Treatment increased pain;dizziness  -MA      Precautions/Limitations fall precautions  -MA  fall precautions  -RW    Recorded by [MA] Charmaine Espinoza, PT [RW] Eri Fonseca, PTA [RW] Eri Fonseca PTA    Pain Assessment    Pain Assessment 0-10  -MA 0-10  -RW 0-10  -RW    Pain Score 4  -MA 7  -RW 4  -RW    Pain Type Acute pain;Surgical pain  -MA Acute pain;Surgical pain  -RW Acute pain;Surgical pain  -RW    Pain Location Knee  -MA Knee  -RW Knee  -RW    Pain Orientation Left  -MA Left  -RW Left  -RW    Pain Intervention(s) Cold applied;Repositioned;Ambulation/increased activity;Rest  -MA Medication (See MAR);Cold applied;Repositioned;Ambulation/increased activity  -RW Medication (See MAR);Repositioned;Ambulation/increased activity;Cold applied  -RW    Response to Interventions tolerated fairly  -MA tolerated  -RW tolerated  -RW    Recorded by [MA] Charmaine Espinoza, PT [RW] Eri Fonseca, PTA [RW] Eri Fonseca PTA    Vision  Assessment/Intervention    Visual Impairment WFL  -MA      Recorded by [MA] Charmaine Espinoza, PT      Cognitive Assessment/Intervention    Current Cognitive/Communication Assessment functional  -MA functional  -RW functional  -RW    Orientation Status oriented x 4  -MA oriented x 4  -RW oriented x 4  -RW    Follows Commands/Answers Questions 100% of the time;able to follow multi-step instructions  -% of the time  -% of the time  -RW    Personal Safety WNL/WFL  -MA WNL/WFL  -RW WNL/WFL  -RW    Personal Safety Interventions fall prevention program maintained;gait belt;nonskid shoes/slippers when out of bed  -MA fall prevention program maintained;gait belt;nonskid shoes/slippers when out of bed  -RW fall prevention program maintained;gait belt;nonskid shoes/slippers when out of bed  -RW    Recorded by [MA] Charmaine Espinoza, PT [RW] Eri Fonseca, PTA [RW] Eri Fonseca PTA    ROM (Range of Motion)    General ROM Detail L knee 12-84'  -MA  L knee 8-84'  -RW    Recorded by [MA] Charmaine Espinoza, PT  [RW] Eri Fonseca PTA    Bed Mobility, Assessment/Treatment    Bed Mobility, Assistive Device  bed rails;head of bed elevated  -RW bed rails;head of bed elevated  -RW    Bed Mob, Supine to Sit, [place]  not tested   Pt up in chair  -RW not tested   Pt up in chair  -RW    Bed Mob, Sit to Supine, [place]  minimum assist (75% patient effort);verbal cues required   assist w/ LLE  -RW contact guard assist;verbal cues required  -RW    Bed Mobility, Comment Patient up in chair  -MA      Recorded by [MA] Charmaine Espinoza, PT [RW] Eri Fonseca, PTA [RW] Eri Fonseca, PTA    Transfer Assessment/Treatment    Transfers, Sit-Stand [place] stand by assist;verbal cues required  -MA stand by assist  -RW contact guard assist;stand by assist  -RW    Transfers, Stand-Sit [place] stand by assist  -MA stand by assist  -RW stand by assist  -RW    Transfers, Sit-Stand-Sit, Assist Device rolling  walker  -MA rolling walker  -RW rolling walker  -RW    Transfer, Comment VC for posture  -MA      Recorded by [MA] Charmaine Espinoza, PT [RW] Eri Fonseca PTA [RW] Eri Fonseca PTA    Gait Assessment/Treatment    Gait, [place] Level stand by assist;verbal cues required  -MA stand by assist;verbal cues required  -RW stand by assist;verbal cues required  -RW    Gait, Assistive Device rolling walker  -MA rolling walker  -RW rolling walker  -RW    Gait, Distance (Feet) 50  -  -  -RW    Gait, Gait Pattern Analysis swing-through gait  -MA swing-through gait  -RW swing-through gait  -RW    Gait, Gait Deviations left:;antalgic;bilateral:;tre decreased;decreased heel strike;step length decreased  -MA forward flexed posture;left:;antalgic;tre decreased  -RW forward flexed posture;left:;antalgic;tre decreased  -RW    Gait, Comment Episodes of dizziness noted and unable to further ambulation due to increased pain  -MA      Recorded by [MA] Charmaine Espinoza, PT [RW] Eri Fonseca PTA [RW] Eri Fonseca PTA    Therapy Exercises    Exercise Protocols total knee  -MA total knee  -RW total knee  -RW    Total Knee Exercises left:;25 reps;completed protocol   required assistance for SLR  -MA left:;20 reps;completed protocol  -RW left:;15 reps;completed protocol  -RW    Recorded by [MA] Charmaine Espinoza, PT [RW] Eri Fonseca PTA [RW] Eri Fonseca PTA    Positioning and Restraints    Pre-Treatment Position sitting in chair/recliner  -MA sitting in chair/recliner  -RW sitting in chair/recliner  -RW    Post Treatment Position chair  -MA bed  -RW bed  -RW    In Bed  fowlers;call light within reach;encouraged to call for assist   ice applied to L knee  -RW fowlers;call light within reach;encouraged to call for assist   ice applied to L knee  -RW    In Chair sitting;call light within reach;encouraged to call for assist;legs elevated   ice pack over L knee  -MA      Recorded by [MA]  Charmaine Espinoza, PT [RW] Eri Fonseca, PTA [RW] Eri Fonseca, PTA      User Key  (r) = Recorded By, (t) = Taken By, (c) = Cosigned By    Initials Name Effective Dates    RW Eri Fonseca, PTA 04/06/16 -     MA Charmaine LUZ Alexis, PT 12/13/16 -                 IP PT Goals       08/22/17 1604          Transfer Training PT LTG    Transfer Training PT LTG, Time to Achieve 3 days  -CH      Transfer Training PT LTG, Activity Type all transfers  -CH      Transfer Training PT LTG, [place] Level supervision required  -CH      Transfer Training PT LTG, Assist Device walker, rolling  -CH      Gait Training PT LTG    Gait Training Goal PT LTG, Time to Achieve 1 wk  -CH      Gait Training Goal PT LTG, [place] Level supervision required  -CH      Gait Training Goal PT LTG, Assist Device walker, rolling  -CH      Gait Training Goal PT LTG, Distance to Achieve 150  -CH      Range of Motion PT LTG    Range of Motion Goal PT LTG, Time to Achieve 3 days  -CH      Range fo Motion Goal PT LTG, Joint L knee  -CH      Range of Motion Goal PT LTG, AROM Measure 0-90  -CH        User Key  (r) = Recorded By, (t) = Taken By, (c) = Cosigned By    Initials Name Provider Type    PATRICIA Lepe, PT Physical Therapist          Physical Therapy Education     Title: PT OT SLP Therapies (Done)     Topic: Physical Therapy (Done)     Point: Mobility training (Done)    Learning Progress Summary    Learner Readiness Method Response Comment Documented by Status   Patient Acceptance E VU  MA 08/24/17 1249 Done    Acceptance E,TB,D VU,NR   08/23/17 1148 Done    Acceptance E,TB,D VU,NR   08/22/17 1603 Done               Point: Home exercise program (Done)    Learning Progress Summary    Learner Readiness Method Response Comment Documented by Status   Patient Acceptance E VU  MA 08/24/17 1249 Done    Acceptance E,TB,D VU,NR   08/23/17 1148 Done    Acceptance E,TB,D VU,NR   08/22/17 1603 Done               Point: Body  mechanics (Done)    Learning Progress Summary    Learner Readiness Method Response Comment Documented by Status   Patient Acceptance E VU  MA 08/24/17 1249 Done    Acceptance E,TB,D VU,NR   08/23/17 1148 Done    Acceptance E,TB,D VU,NR   08/22/17 1603 Done               Point: Precautions (Done)    Learning Progress Summary    Learner Readiness Method Response Comment Documented by Status   Patient Acceptance E VU  MA 08/24/17 1249 Done    Acceptance E,TB,D VU,NR   08/23/17 1148 Done    Acceptance E,TB,D VU,NR   08/22/17 1603 Done                      User Key     Initials Effective Dates Name Provider Type Discipline     12/01/15 -  Stefania Lepe, PT Physical Therapist PT     04/06/16 -  Eri Fonseca, PTA Physical Therapy Assistant PT    MA 12/13/16 -  Charmaine Espinoza, PT Physical Therapist PT                    PT Recommendation and Plan  Anticipated Discharge Disposition: skilled nursing facility, home with home health (pt states she is interested in SNF because  works)  Planned Therapy Interventions: balance training, bed mobility training, gait training, home exercise program, patient/family education, transfer training  PT Frequency: 2 times/day  Plan of Care Review  Plan Of Care Reviewed With: (P) patient  Outcome Summary/Follow up Plan: (P) Episodes of pain and dizziness noted this date. Tolerated exercises and ambulation fairly. Ambulaiton limited due to pain. Will continue to address defiicits and progress as appropriate. RW ordered for patient.          Outcome Measures       08/24/17 1200 08/23/17 0942 08/22/17 1600    How much help from another person do you currently need...    Turning from your back to your side while in flat bed without using bedrails? 3  -MA 3  -RW 3  -CH    Moving from lying on back to sitting on the side of a flat bed without bedrails? 3  -MA 3  -RW 3  -CH    Moving to and from a bed to a chair (including a wheelchair)? 3  -MA 3  -RW 3  -CH    Standing  up from a chair using your arms (e.g., wheelchair, bedside chair)? 3  -MA 3  -RW 3  -CH    Climbing 3-5 steps with a railing? 3  -MA 3  -RW 2  -CH    To walk in hospital room? 3  -MA 3  -RW 2  -CH    AM-PAC 6 Clicks Score 18  -MA 18  -RW 16  -CH    Functional Assessment    Outcome Measure Options AM-PAC 6 Clicks Basic Mobility (PT)  -MA AM-PAC 6 Clicks Basic Mobility (PT)  -RW AM-PAC 6 Clicks Basic Mobility (PT)  -CH      User Key  (r) = Recorded By, (t) = Taken By, (c) = Cosigned By    Initials Name Provider Type     Stefania Lepe, PT Physical Therapist    KENIA Fonseca, PTA Physical Therapy Assistant    ROBER Espinoza, PT Physical Therapist           Time Calculation:         PT Charges       08/24/17 1251          Time Calculation    Start Time 1010  -MA      Stop Time 1050  -MA      Time Calculation (min) 40 min  -MA      PT Received On 08/24/17  -MA      PT - Next Appointment 08/24/17  -MA        User Key  (r) = Recorded By, (t) = Taken By, (c) = Cosigned By    Initials Name Provider Type    ROBER Espinoza, PT Physical Therapist          Therapy Charges for Today     Code Description Service Date Service Provider Modifiers Qty    09968721654  PT THER PROC GROUP 8/24/2017 Charmaine Espinoza, PT GP 1    [id number]  PT THER PROC EA 15 MIN 8/24/2017 Charmaine Espinoza, PT GP 1          PT G-Codes  Outcome Measure Options: AM-PAC 6 Clicks Basic Mobility (PT)    Charmaine Espinoza PT  8/24/2017

## 2017-08-24 NOTE — PROGRESS NOTES
"Ortho POD 2    Patient Name:  Denisse Flores  YOB: 1960  Medical Records Number:  [id number]    No complaints except pain    /77 (BP Location: Left arm, Patient Position: Lying)  Pulse 75  Temp 97.8 °F (36.6 °C) (Oral)   Resp 16  Ht 65\" (165.1 cm)  Wt 155 lb (70.3 kg)  SpO2 93%  BMI 25.79 kg/m2    LLE:  NVI, calf nontender, sensation intact  No signs of DVT    Incision: clean, no infection    Lab Results (last 24 hours)     Procedure Component Value Units Date/Time    CBC (No Diff) [[id number]]  (Abnormal) Collected:  08/24/17 0437    Specimen:  Blood Updated:  08/24/17 0508     WBC 15.74 (H) 10*3/mm3      RBC 3.34 (L) 10*6/mm3      Hemoglobin 10.9 (L) g/dL      Hematocrit 33.6 (L) %      .6 (H) fL      MCH 32.6 (H) pg      MCHC 32.4 g/dL      RDW 13.7 (H) %      RDW-SD 50.5 fl      MPV 10.4 fL      Platelets 250 10*3/mm3           S/p Left TKA  WBAT with walker  ASA for DVT prophylaxis  Dc tomorrow to  or rehab   "

## 2017-08-24 NOTE — PROGRESS NOTES
Name: Denisse Flores ADMIT: 2017   : 1960  PCP: Maye Mccurdy, APRN    MRN: [id number] LOS: 2 days   AGE/SEX: 57 y.o. female  ROOM: [facility]   Subjective   Subjective  CC/Reason for follow-up: left knee pain  No acute events.  Patient c/o increased left knee pain with mobilization, norco helps with this but she does not like the way it makes her feel. Taking PO well.  No n/v/d.  Denies cough, dyspnea, and dysuria.  Objective   Vital Signs  Temp:  [97.3 °F (36.3 °C)-98.7 °F (37.1 °C)] 97.3 °F (36.3 °C)  Heart Rate:  [61-87] 80  Resp:  [16] 16  BP: (104-126)/(52-79) 126/74  SpO2:  [93 %-99 %] 99 %  on   ;   O2 Device: room air  Body mass index is 25.79 kg/(m^2).    Physical Exam  General: alert, no distress  Head: NCAT  Eyes: EOMI, conjunctivae normal  Mouth/Throat: Oropharynx clear and moist  Neck: supple, no JVD  Cardiac: Normal rate, regular rhythm  Respiratory: Normal effort, clear to ascultation  Abdomen: soft, non-tender, bowel sounds are normoactive  Musculoskeletal: left knee in surgical dressing without swelling or surrounding erythema or drainage, no tenderness  Extremities: well-perfused, no cyanosis, no edema  Neuro: oriented x3, no gross deficits  Psych: mood and affect appropriate  Results Review:       I reviewed the patient's new clinical results.    Results from last 7 days  Lab Units 17  0437 17  04517  2043   WBC 10*3/mm3 15.74*  --  18.39*   HEMOGLOBIN g/dL 10.9* 11.5* 11.9   PLATELETS 10*3/mm3 250  --  264     Results from last 7 days  Lab Units 17  0452 17  2043   SODIUM mmol/L 137 138   POTASSIUM mmol/L 4.5 4.2   CHLORIDE mmol/L 100 99   CO2 mmol/L 21.7* 19.3*   BUN mg/dL 12 13   CREATININE mg/dL 0.73 0.78   GLUCOSE mg/dL 150* 168*   Estimated Creatinine Clearance: 84.6 mL/min (by C-G formula based on Cr of 0.73).  Results from last 7 days  Lab Units 17  0452 17  2043   CALCIUM mg/dL 8.8 8.6         aspirin 325 mg Oral BID With  Meals   atorvastatin 10 mg Oral Nightly   cetirizine 10 mg Oral Daily   escitalopram 10 mg Oral Daily   estradiol 1 mg Oral Daily   ferrous sulfate 325 mg Oral Daily With Breakfast   sennosides-docusate sodium 2 tablet Oral BID   vitamin B-12 1,000 mcg Oral Daily   zolpidem 10 mg Oral Nightly       lactated ringers 9 mL/hr Last Rate: Stopped (08/22/17 1410)   Diet Regular      Assessment/Plan   Assessment:     Active Hospital Problems (** Indicates Principal Problem)    Diagnosis Date Noted   • **OA (osteoarthritis) of knee [M17.9] 08/22/2017   • Insomnia [G47.00] 08/23/2017   • Anxiety [F41.9] 08/22/2017   • Dyspnea [R06.00] 08/22/2017   • Leukocytosis [D72.829] 08/22/2017      Resolved Hospital Problems    Diagnosis Date Noted Date Resolved   No resolved problems to display.       Plan:   OA of left knee  -s/p TKA 8/22  -pain control-encouraged the patient to try tramadol and see if this is any better  -mobility efforts    Dyspnea  -resolved  -likely from anxiety    Leukocytosis  -improved today, likely reactive from surgery  -no evidence of infection  -f/u CBC in AM    DVT prophylaxis  -ASA BID per primary team      Disposition  TBD.    [place] MD  [facility]  08/24/17  12:31 PM

## 2017-08-25 VITALS
SYSTOLIC BLOOD PRESSURE: 123 MMHG | BODY MASS INDEX: 25.83 KG/M2 | TEMPERATURE: 97.7 F | HEIGHT: 65 IN | HEART RATE: 75 BPM | WEIGHT: 155 LBS | OXYGEN SATURATION: 93 % | DIASTOLIC BLOOD PRESSURE: 76 MMHG | RESPIRATION RATE: 16 BRPM

## 2017-08-25 LAB
HCT VFR BLD AUTO: 34.3 % (ref 35.6–45.5)
HGB BLD-MCNC: 11 G/DL (ref 11.9–15.5)

## 2017-08-25 PROCEDURE — 97110 THERAPEUTIC EXERCISES: CPT

## 2017-08-25 PROCEDURE — 85018 HEMOGLOBIN: CPT | Performed by: ORTHOPAEDIC SURGERY

## 2017-08-25 PROCEDURE — 97150 GROUP THERAPEUTIC PROCEDURES: CPT

## 2017-08-25 PROCEDURE — 85014 HEMATOCRIT: CPT | Performed by: ORTHOPAEDIC SURGERY

## 2017-08-25 RX ORDER — HYDROCODONE BITARTRATE AND ACETAMINOPHEN 10; 325 MG/1; MG/1
1-2 TABLET ORAL EVERY 4 HOURS PRN
Qty: 120 TABLET | Refills: 0 | Status: SHIPPED | OUTPATIENT
Start: 2017-08-25 | End: 2019-02-01

## 2017-08-25 RX ORDER — PSEUDOEPHEDRINE HCL 30 MG
100 TABLET ORAL 2 TIMES DAILY PRN
Qty: 40 CAPSULE | Refills: 0 | Status: SHIPPED | OUTPATIENT
Start: 2017-08-25 | End: 2019-02-01

## 2017-08-25 RX ADMIN — ESTRADIOL 1 MG: 1 TABLET ORAL at 09:03

## 2017-08-25 RX ADMIN — ESCITALOPRAM 10 MG: 10 TABLET, FILM COATED ORAL at 09:03

## 2017-08-25 RX ADMIN — CYANOCOBALAMIN TAB 500 MCG 1000 MCG: 500 TAB at 09:03

## 2017-08-25 RX ADMIN — HYDROCODONE BITARTRATE AND ACETAMINOPHEN 2 TABLET: 10; 325 TABLET ORAL at 06:00

## 2017-08-25 RX ADMIN — DOCUSATE SODIUM -SENNOSIDES 2 TABLET: 50; 8.6 TABLET, COATED ORAL at 09:03

## 2017-08-25 RX ADMIN — HYDROCODONE BITARTRATE AND ACETAMINOPHEN 2 TABLET: 10; 325 TABLET ORAL at 11:59

## 2017-08-25 RX ADMIN — CETIRIZINE HYDROCHLORIDE 10 MG: 10 TABLET, FILM COATED ORAL at 09:03

## 2017-08-25 RX ADMIN — ASPIRIN 325 MG: 325 TABLET, DELAYED RELEASE ORAL at 09:03

## 2017-08-25 RX ADMIN — FERROUS SULFATE TAB 325 MG (65 MG ELEMENTAL FE) 325 MG: 325 (65 FE) TAB at 09:04

## 2017-08-25 RX ADMIN — HYDROCODONE BITARTRATE AND ACETAMINOPHEN 2 TABLET: 10; 325 TABLET ORAL at 02:05

## 2017-08-25 NOTE — THERAPY TREATMENT NOTE
Acute Care - Physical Therapy Treatment Note  [facility]     Patient Name: Denisse Flores  : 1960  MRN: [id number]  Today's Date: 2017  Onset of Illness/Injury or Date of Surgery Date: 17          Admit Date: 2017    Visit Dx:    ICD-10-CM ICD-9-CM   1. Difficulty walking R26.2 719.7     Patient Active Problem List   Diagnosis   • OA (osteoarthritis) of knee   • Anxiety   • Dyspnea   • Leukocytosis   • Insomnia               Adult Rehabilitation Note       17 0945 17 1535 17 1246    Rehab Assessment/Intervention    Discipline physical therapy assistant  -JM physical therapist  -MA physical therapist  -MA    Document Type therapy note (daily note)  -JM therapy note (daily note)  -MA therapy note (daily note)  -MA    Subjective Information agree to therapy;complains of;pain;fatigue;weakness  - agree to therapy;complains of;pain  -MA agree to therapy;complains of;pain  -MA    Patient Effort, Rehab Treatment  good  -MA good  -MA    Symptoms Noted During/After Treatment  increased pain  -MA increased pain;dizziness  -MA    Precautions/Limitations fall precautions  -JM fall precautions  -MA fall precautions  -MA    Recorded by [MARINA] Nadiya Hartman PTA [MA] Charmaine Espinoza, PT [MA] Charmaine Espinoza, PT    Pain Assessment    Pain Assessment 0-10  -JM 0-10  -MA 0-10  -MA    Pain Score 4  -JM 8  -MA 4  -MA    Pain Type Surgical pain  -JM Acute pain;Surgical pain  -MA Acute pain;Surgical pain  -MA    Pain Location Knee  -JM Knee  -MA Knee  -MA    Pain Orientation Left  -JM Left  -MA Left  -MA    Pain Intervention(s) Medication (See MAR);Repositioned  -JM Cold applied;Ambulation/increased activity;Elevated;Rest  -MA Cold applied;Repositioned;Ambulation/increased activity;Rest  -MA    Response to Interventions naina  -JM tolerance improved with PM session  -MA tolerated fairly  -MA    Recorded by [JM] Nadiya Hartman PTA [MA] Charmaine Espinoza, PT [MA] Charmaine Espinoza, PT     Vision Assessment/Intervention    Visual Impairment  WFL  -MA WFL  -MA    Recorded by  [MA] Charmaine Espinoza, PT [MA] Charmaine Espinoza PT    Cognitive Assessment/Intervention    Current Cognitive/Communication Assessment  functional  -MA functional  -MA    Orientation Status  oriented x 4  -MA oriented x 4  -MA    Follows Commands/Answers Questions  100% of the time;able to follow multi-step instructions;needs increased time  -% of the time;able to follow multi-step instructions  -MA    Personal Safety  WNL/WFL  -MA WNL/WFL  -MA    Personal Safety Interventions  fall prevention program maintained;gait belt;nonskid shoes/slippers when out of bed  -MA fall prevention program maintained;gait belt;nonskid shoes/slippers when out of bed  -MA    Recorded by  [MA] Charmaine Espinoza, PT [MA] Charmaine Espinoza PT    ROM (Range of Motion)    General ROM Detail L knee -10-75  -  L knee 12-84'  -MA    Recorded by [JM] Nadiya Hartman PTA  [MA] Charmaine Espinoza PT    Bed Mobility, Assessment/Treatment    Bed Mobility, Scoot/Bridge, [place] contact guard assist  -      Bed Mob, Supine to Sit, [place] contact guard assist;verbal cues required  -      Bed Mobility, Comment  Patient up in chair  -MA Patient up in chair  -MA    Recorded by [JM] Nadiya Hartman PTA [MA] Charmaine Espinoza, PT [MA] Charmaine Espinoza, PT    Transfer Assessment/Treatment    Transfers, Sit-Stand [place] verbal cues required;supervision required   slow paced  - stand by assist  -MA stand by assist;verbal cues required  -MA    Transfers, Stand-Sit [place] supervision required;verbal cues required  - stand by assist  -MA stand by assist  -MA    Transfers, Sit-Stand-Sit, Assist Device rolling walker  - rolling walker  -MA rolling walker  -MA    Transfer, Comment  VC for pushing from chair for sit<>stand  -MA VC for posture  -MA    Recorded by [MARINA] Nadiya Hartman PTA [MA] Charmaine Espinoza, PT [MA] Charmaine  LUZ Espinoza PT    Gait Assessment/Treatment    Gait, [place] Level supervision required;verbal cues required  -JM stand by assist;verbal cues required  -MA stand by assist;verbal cues required  -MA    Gait, Assistive Device rolling walker  -JM rolling walker  -MA rolling walker  -MA    Gait, Distance (Feet) 100  -JM 75  -MA 50  -MA    Gait, Gait Pattern Analysis  swing-through gait  -MA swing-through gait  -MA    Gait, Gait Deviations tre decreased;forward flexed posture;step length decreased;decreased heel strike;left:  - left:;antalgic;bilateral:;tre decreased;step length decreased  -MA left:;antalgic;bilateral:;tre decreased;decreased heel strike;step length decreased  -MA    Gait, Safety Issues step length decreased;weight-shifting ability decreased  -      Gait, Comment   Episodes of dizziness noted and unable to further ambulation due to increased pain  -MA    Recorded by [JM] Nadiya Hartman PTA [MA] Charmaine Espinoza, PT [MA] Charmaine Espinoza PT    Therapy Exercises    Exercise Protocols total knee  -JM total knee  -MA total knee  -MA    Total Knee Exercises left:;30 reps;completed protocol;with assist;SLR;SAQ  -JM left:;30 reps;completed protocol   assistance required with SAQ and SLR  -MA left:;25 reps;completed protocol   required assistance for SLR  -MA    Recorded by [JM] Nadiya Hartman PTA [MA] Charmaine Espinoza, PT [MA] Charmaine Espinoza, PT    Positioning and Restraints    Pre-Treatment Position sitting in chair/recliner  -JM sitting in chair/recliner  -MA sitting in chair/recliner  -MA    Post Treatment Position  chair  -MA chair  -MA    In Chair reclined;call light within reach;encouraged to call for assist  - reclined;call light within reach;encouraged to call for assist;legs elevated  -MA sitting;call light within reach;encouraged to call for assist;legs elevated   ice pack over L knee  -MA    Recorded by [MARINA] Nadiya Hartman PTA [MA] Charmaine Espinoza, PT [MA]  Charmaine Espinoza, PT      08/23/17 1400 08/23/17 0942       Rehab Assessment/Intervention    Discipline physical therapy assistant  -RW physical therapy assistant  -RW     Document Type therapy note (daily note)  -RW therapy note (daily note)  -RW     Subjective Information agree to therapy;complains of;pain  -RW agree to therapy  -RW     Patient Effort, Rehab Treatment good  -RW good  -RW     Precautions/Limitations  fall precautions  -RW     Recorded by [RW] Eri Fonseca PTA [RW] Eri Fonseca PTA     Pain Assessment    Pain Assessment 0-10  -RW 0-10  -RW     Pain Score 7  -RW 4  -RW     Pain Type Acute pain;Surgical pain  -RW Acute pain;Surgical pain  -RW     Pain Location Knee  -RW Knee  -RW     Pain Orientation Left  -RW Left  -RW     Pain Intervention(s) Medication (See MAR);Cold applied;Repositioned;Ambulation/increased activity  -RW Medication (See MAR);Repositioned;Ambulation/increased activity;Cold applied  -RW     Response to Interventions tolerated  -RW tolerated  -RW     Recorded by [RW] Eri Fonseca PTA [RW] Eri Fonseca PTA     Cognitive Assessment/Intervention    Current Cognitive/Communication Assessment functional  -RW functional  -RW     Orientation Status oriented x 4  -RW oriented x 4  -RW     Follows Commands/Answers Questions 100% of the time  -% of the time  -RW     Personal Safety WNL/WFL  -RW WNL/WFL  -RW     Personal Safety Interventions fall prevention program maintained;gait belt;nonskid shoes/slippers when out of bed  -RW fall prevention program maintained;gait belt;nonskid shoes/slippers when out of bed  -RW     Recorded by [RW] Eri Fonseca PTA [RW] Eri Fonseca PTA     ROM (Range of Motion)    General ROM Detail  L knee 8-84'  -RW     Recorded by  [RW] Eri Fonseca PTA     Bed Mobility, Assessment/Treatment    Bed Mobility, Assistive Device bed rails;head of bed elevated  -RW bed rails;head of bed elevated  -RW     Bed Mob, Supine to Sit,  [place] not tested   Pt up in chair  -RW not tested   Pt up in chair  -RW     Bed Mob, Sit to Supine, [place] minimum assist (75% patient effort);verbal cues required   assist w/ LLE  -RW contact guard assist;verbal cues required  -RW     Recorded by [RW] Eri Fonseca PTA [RW] Eri Fonseca PTA     Transfer Assessment/Treatment    Transfers, Sit-Stand [place] stand by assist  -RW contact guard assist;stand by assist  -RW     Transfers, Stand-Sit [place] stand by assist  -RW stand by assist  -RW     Transfers, Sit-Stand-Sit, Assist Device rolling walker  -RW rolling walker  -RW     Recorded by [RW] Eri Fonseca PTA [RW] Eri Fonseca PTA     Gait Assessment/Treatment    Gait, [place] Level stand by assist;verbal cues required  -RW stand by assist;verbal cues required  -RW     Gait, Assistive Device rolling walker  -RW rolling walker  -RW     Gait, Distance (Feet) 100  -  -RW     Gait, Gait Pattern Analysis swing-through gait  -RW swing-through gait  -RW     Gait, Gait Deviations forward flexed posture;left:;antalgic;tre decreased  -RW forward flexed posture;left:;antalgic;tre decreased  -RW     Recorded by [RW] Eri Fonseca PTA [RW] Eri Fonseca PTA     Therapy Exercises    Exercise Protocols total knee  -RW total knee  -RW     Total Knee Exercises left:;20 reps;completed protocol  -RW left:;15 reps;completed protocol  -RW     Recorded by [RW] Eri Fonseca PTA [RW] Eri Fonseca PTA     Positioning and Restraints    Pre-Treatment Position sitting in chair/recliner  -RW sitting in chair/recliner  -RW     Post Treatment Position bed  -RW bed  -RW     In Bed fowlers;call light within reach;encouraged to call for assist   ice applied to L knee  -RW fowlers;call light within reach;encouraged to call for assist   ice applied to L knee  -RW     Recorded by [RW] Eri Fonseca PTA [RW] Eri Fonseca PTA       User Key  (r) = Recorded By, (t) = Taken By,  (c) = Cosigned By    Initials Name Effective Dates     Nadiya Hartman, PTA 02/18/16 -     RW Eriestrella Fonseca, PTA 04/06/16 -     ROBER Espinoza, PT 12/13/16 -                 IP PT Goals       08/22/17 1604          Transfer Training PT LTG    Transfer Training PT LTG, Time to Achieve 3 days  -CH      Transfer Training PT LTG, Activity Type all transfers  -CH      Transfer Training PT LTG, [place] Level supervision required  -CH      Transfer Training PT LTG, Assist Device walker, rolling  -CH      Gait Training PT LTG    Gait Training Goal PT LTG, Time to Achieve 1 wk  -CH      Gait Training Goal PT LTG, [place] Level supervision required  -CH      Gait Training Goal PT LTG, Assist Device walker, rolling  -CH      Gait Training Goal PT LTG, Distance to Achieve 150  -CH      Range of Motion PT LTG    Range of Motion Goal PT LTG, Time to Achieve 3 days  -CH      Range fo Motion Goal PT LTG, Joint L knee  -CH      Range of Motion Goal PT LTG, AROM Measure 0-90  -CH        User Key  (r) = Recorded By, (t) = Taken By, (c) = Cosigned By    Initials Name Provider Type    CH Stefania Lepe, PT Physical Therapist          Physical Therapy Education     Title: PT OT SLP Therapies (Resolved)     Topic: Physical Therapy (Resolved)     Point: Mobility training (Resolved)    Learning Progress Summary    Learner Readiness Method Response Comment Documented by Status   Patient Acceptance E,TB,D VU,NR   08/25/17 1312 Done    Acceptance E VU  MA 08/24/17 1249 Done    Acceptance E,TB,D VU,NR   08/23/17 1148 Done    Acceptance E,TB,D VU,NR   08/22/17 1603 Done               Point: Home exercise program (Resolved)    Learning Progress Summary    Learner Readiness Method Response Comment Documented by Status   Patient Acceptance E,TB,D VU,NR   08/25/17 1312 Done    Acceptance E VU  MA 08/24/17 1249 Done    Acceptance E,TB,D VU,NR   08/23/17 1148 Done    Acceptance E,TB,D VU,NR   08/22/17 1603 Done                Point: Body mechanics (Resolved)    Learning Progress Summary    Learner Readiness Method Response Comment Documented by Status   Patient Acceptance E,TB,D VU,NR   08/25/17 1312 Done    Acceptance E VU  MA 08/24/17 1249 Done    Acceptance E,TB,D VU,NR   08/23/17 1148 Done    Acceptance E,TB,D VU,NR   08/22/17 1603 Done               Point: Precautions (Resolved)    Learning Progress Summary    Learner Readiness Method Response Comment Documented by Status   Patient Acceptance E,TB,D VU,NR   08/25/17 1312 Done    Acceptance E VU  MA 08/24/17 1249 Done    Acceptance E,TB,D VU,NR   08/23/17 1148 Done    Acceptance E,TB,D VU,NR   08/22/17 1603 Done                      User Key     Initials Effective Dates Name Provider Type Discipline     12/01/15 -  Stefania Lepe, PT Physical Therapist PT     02/18/16 -  Nadiya Hartman, PTA Physical Therapy Assistant PT     04/06/16 -  Eri Fonseca, PTA Physical Therapy Assistant PT    MA 12/13/16 -  Charmaine Espinoza, PT Physical Therapist PT                    PT Recommendation and Plan  Anticipated Discharge Disposition: skilled nursing facility, home with home health (pt states she is interested in SNF because  works)  Planned Therapy Interventions: balance training, bed mobility training, gait training, home exercise program, patient/family education, transfer training  PT Frequency: 2 times/day             Outcome Measures       08/25/17 1300 08/24/17 1200 08/23/17 0942    How much help from another person do you currently need...    Turning from your back to your side while in flat bed without using bedrails? 3  -JM 3  -MA 3  -RW    Moving from lying on back to sitting on the side of a flat bed without bedrails? 3  -JM 3  -MA 3  -RW    Moving to and from a bed to a chair (including a wheelchair)? 3  -JM 3  -MA 3  -RW    Standing up from a chair using your arms (e.g., wheelchair, bedside chair)? 3  - 3  -MA 3  -RW    Climbing 3-5  steps with a railing? 3  -JM 3  -MA 3  -RW    To walk in hospital room? 3  -JM 3  -MA 3  -RW    AM-PAC 6 Clicks Score 18  -JM 18  -MA 18  -RW    Functional Assessment    Outcome Measure Options  AM-PAC 6 Clicks Basic Mobility (PT)  -MA AM-PAC 6 Clicks Basic Mobility (PT)  -RW      08/22/17 1600          How much help from another person do you currently need...    Turning from your back to your side while in flat bed without using bedrails? 3  -CH      Moving from lying on back to sitting on the side of a flat bed without bedrails? 3  -CH      Moving to and from a bed to a chair (including a wheelchair)? 3  -CH      Standing up from a chair using your arms (e.g., wheelchair, bedside chair)? 3  -CH      Climbing 3-5 steps with a railing? 2  -CH      To walk in hospital room? 2  -CH      AM-PAC 6 Clicks Score 16  -CH      Functional Assessment    Outcome Measure Options AM-PAC 6 Clicks Basic Mobility (PT)  -        User Key  (r) = Recorded By, (t) = Taken By, (c) = Cosigned By    Initials Name Provider Type     Stefania Lepe, PT Physical Therapist    MARINA Hartman PTA Physical Therapy Assistant    KENIA Fonseca PTA Physical Therapy Assistant    ROBER Espinoza, PT Physical Therapist           Time Calculation:         PT Charges       08/25/17 1306          Time Calculation    Start Time 0938  -      Stop Time 1045  -      Time Calculation (min) 67 min  -      PT Received On 08/25/17  -        User Key  (r) = Recorded By, (t) = Taken By, (c) = Cosigned By    Initials Name Provider Type    MARINA Hartman PTA Physical Therapy Assistant          Therapy Charges for Today     Code Description Service Date Service Provider Modifiers Qty    63184303707 HC PT THER PROC EA 15 MIN 8/25/2017 Nadiya Hartman PTA GP 2    79733355529 HC PT THER PROC GROUP 8/25/2017 Nadiya Hartman PTA GP 1          PT G-Codes  Outcome Measure Options: AM-PAC 6 Clicks Basic Mobility (PT)    Nadiya Hartman  PTA  8/25/2017

## 2017-08-25 NOTE — PROGRESS NOTES
"Ortho POD 3    Patient Name:  Denisse Flores  YOB: 1960  Medical Records Number:  [id number]    No complaints    /76 (BP Location: Left arm, Patient Position: Lying)  Pulse 75  Temp 97.7 °F (36.5 °C) (Oral)   Resp 16  Ht 65\" (165.1 cm)  Wt 155 lb (70.3 kg)  SpO2 93%  BMI 25.79 kg/m2    LLE:  NVI, calf nontender, sensation intact  No signs of DVT    Incision: clean, no infection    Lab Results (last 24 hours)     Procedure Component Value Units Date/Time    Hemoglobin & Hematocrit, Blood [[id number]]  (Abnormal) Collected:  08/25/17 0418    Specimen:  Blood Updated:  08/25/17 0451     Hemoglobin 11.0 (L) g/dL      Hematocrit 34.3 (L) %           S/p Left TKA  WBAT with walker  ASA for DVT prophylaxis  Home with Home Health  "

## 2017-08-25 NOTE — DISCHARGE SUMMARY
Discharge Summary    Patient Name:  Denisse Flores  YOB: 1960  Medical Records Number:  [id number]    Date of Admission:  8/22/2017  Date of Discharge:  8/25/2017    Primary Discharge Diagnosis:  OA (osteoarthritis) of knee [M17.9]    Secondary Discharge Diagnosis:    Problem List Items Addressed This Visit     None          Procedures Performed:  Left Total Knee Arthroplasty      Hospital Course:    Denisse Flores is a 57 y.o.  female who underwent successful left tka on 8/22/2017.  Denisse Flores was started on Aspirin 325mg po twice daily immediately post-operatively for DVT prophylaxis.  On post-op day 1 the patients dressing was changed and their incision was clean, with no signs of infection and their calf was soft, with no signs of DVT.  The patient progressed well with physical therapy and the patients hemoglobin remained stable. On post-operative day 3 the patient was felt ready for discharge.      Total Knee Joint Replacement Discharge Instructions:    I. ACTIVITIES:  1. Exercises:  ? Complete exercise program as taught by the hospital physical therapist 2 times per day  ? Exercise program will be advanced by the physical therapist  ? During the day be up ambulating every 2 hours (while awake) for short distances  ? Complete the ankle pump exercises at least 10 times per hour (while awake)  ? Elevate legs most of the day the first week post operatively and thereafter elevate legs when in bed and for at least 30 minutes during the day. Caution must be taken to avoid pillow placement under the bend of the knee as this can led to flexion contractures of the knee.  ? Use cold packs 20-30 minutes approximately 5 times per day. This should be done before and after completing your exercises and at any time you are experiencing pain/ stiffness in your operative extremity.      2. Activities of Daily Living:  ? No tub baths, hot tubs, or swimming pools for 4 weeks  ? May shower and let water run over  the incision on post-operative day #5 if no drainage. Do not scrub or rub the incision. Simply let the water run over the incision and pat dry.    II. Restrictions  ? Do not cross legs or kneel  ? Your surgeon will discuss with you when you will be able to drive again.  ? Weight bearing is as tolerated  ? First week stay inside on even terrain. May go up and down stairs one stair at a time utilizing the hand rail  ? After one week, you may venture outside.    III. Precautions:  ? Everyone that comes near you should wash their hands  ? No elective dental, genital-urinary, or colon procedures or surgical procedures for 12 weeks after surgery unless absolutely necessary.  ?  If dental work or surgical procedure is deemed absolutely necessary, you will need to contact your surgeon as you will need to take antibiotics 1 hour prior to any dental work (including teeth cleanings).  ? Please discuss with your surgeon prophylactic antibiotics as the length of time this intervention will be necessary for you varies with each patient’s health history and situation.  ? Avoid sick people. If you must be around someone who is ill, they should wear a mask.  ? Avoid visits to the Emergency Room or Urgent Care unless you are having a life threatening event.   ? If ordered stockings are to be placed on in the morning and removed at night. Monitor the stockings to ensure that any swelling is not causing the stockings to become too tight. In this case, remove stockings immediately.    IV. INCISION CARE:  ? Wash your hands prior to dressing changes  ? Change the dressing as needed to keep incision clean and dry. Utilize dry gauze and paper tape. Avoid touching the side of the gauze that goes against the incision with your hands.  ? No creams or ointments to the incision  ? May remove dressing once the incision is free of drainage  ? Do not touch or pick at the incision  ? Check incision every day and notify surgeon immediately if any of  the following signs or symptoms are noted:  o Increase in redness  o Increase in swelling around the incision and of the entire extremity  o Increase in pain  o Drainage oozing from the incision  o Pulling apart of the edges of the incision  o Increase in overall body temperature (greater than 100.5 degrees)  ? Your surgeon will instruct you regarding suture or staple removal    V. Medications:   1. Anticoagulants: You will be discharged on an anticoagulant. This is a prophylactic medication that helps prevent blood clots during your post-operative period. The type and length of dosage varies based on your individual needs, procedure performed, and surgeon’s preference.  ? While taking the anticoagulant, you should avoid taking any additional aspirin, ibuprofen (Advil or Motrin), Aleve (Naprosyn) or other non-steroidal anti-inflammatory medications.   ? Notify surgeon immediately if any bay bleeding is noted in the urine, stool, emesis, or from the nose or the incision. Blood in the stool will often appear as black rather than red. Blood in urine may appear as pink. Blood in emesis may appear as brown/black like coffee grounds.  ? You will need to apply pressure for longer periods of time to any cuts or abrasions to stop bleeding  ? Avoid alcohol while taking anticoagulants    2. Stool Softeners: You will be at greater risk of constipation after surgery due to being less mobile and the pain medications.   ? Take stool softeners as instructed by your surgeon while on pain medications. Over the counter Colace 100 mg 1-2 capsules twice daily.   ? If stools become too loose or too frequent, please decreases the dosage or stop the stool softener.  ? If constipation occurs despite use of stool softeners, you are to continue the stool softeners and add a laxative (Milk of Magnesia 1 ounce daily as needed)  ? Drink plenty of fluids, and eat fruits and vegetables during your recovery time    3. Pain Medications utilized  after surgery are narcotics and the law requires that the following information be given to all patients that are prescribed narcotics:  ? CLASSIFICATION: Pain medications are called Opioids and are narcotics  ? LEGALITIES: It is illegal to share narcotics with others and to drive within 24 hours of taking narcotics  ? POTENTIAL SIDE EFFECTS: Potential side effects of opioids include: nausea, vomiting, itching, dizziness, drowsiness, dry mouth, constipation, and difficulty urinating.  ? POTENTIAL ADVERSE EFFECTS:   o Opioid tolerance can develop with use of pain medications and this simply means that it requires more and more of the medication to control pain; however, this is seen more in patients that use opioids for longer periods of time.  o Opioid dependence can develop with use of Opioids and this simply means that to stop the medication can cause withdrawal symptoms; however, this is seen with patients that use Opioids for longer periods of time.  o Opioid addiction can develop with use of Opioids and the incidence of this is very unlikely in patients who take the medications as ordered and stop the medications as instructed.  o Opioid overdose can be dangerous, but is unlikely when the medication is taken as ordered and stopped when ordered. It is important not to mix opioids with alcohol or with and type of sedative such as Benadryl as this can lead to over sedation and respiratory difficulty.  ? DOSAGE:   o Pain medications will need to be taken consistently for the first week to decrease pain and promote adequate pain relief and participation in physical therapy.  o After the initial surgical pain begins to resolve, you may begin to decrease the pain medication. By the end of 6-8 weeks, you should be off of pain medications.  o Refills will not be given by the office during evening hours, on weekends, or after 6-8 weeks post-op.  o To seek refills on pain medications during the initial 6 week  post-operative period, you must call the office 48 hours in advance to request the refill. The office will then notify you when to  the prescription. DO NOT wait until you are out of the medication to request a refill.    V. FOLLOW-UP VISITS:  ? You will need to follow up in the office with your surgeon in 3 weeks. Please call this number [phone] to schedule this appointment.  If you have any concerns or suspected complications prior to your follow up visit, please call your surgeons office. Do not wait until your appointment time if you suspect complications. These will need to be addressed in the office promptly.      Discharge Medications:     1) Norco 10/325 mg  1-2 po q 4-6 hours for pain control  2)  Aspirin 325 mg po twice daily for 2 weeks, then once daily for 4 weeks.    CC:Maye Mccurdy, APRN:Pedro Priest MD

## 2017-08-25 NOTE — PAYOR COMM NOTE
"Denisse Flores (57 y.o. Female)     Date of Birth Social Security Number Address Home Phone MRN    1960  [id number] [place] [address] [phone] [id number]    [employer] Marital Status          Mu-ism        Admission Date Admission Type Admitting Provider Attending Provider Department, Room/Bed    8/22/17 Elective Pedro Priest MD  [address], P797/1    Discharge Date Discharge Disposition Discharge Destination        8/25/2017 Home or Self Care             Attending Provider: (none)    Allergies:  Celecoxib    Isolation:  None   Infection:  None   Code Status:  FULL    Ht:  65\" (165.1 cm)   Wt:  155 lb (70.3 kg)    Admission Cmt:  None   Principal Problem:  OA (osteoarthritis) of knee [M17.9]                 Active Insurance as of 8/22/2017     Primary Coverage     Payor Plan Insurance Group Employer/Plan Group    [employer] PPO [id number]     Payor Plan Address Payor Plan Phone Number Effective From Effective To    [address] [phone] 4/1/2012     [place] [address]       Subscriber Name Subscriber Birth Date Member ID       KENNEDY FLORES 1960 [id number]                 Emergency Contacts      (Rel.) Home Phone Work Phone Mobile Phone    Kennedy Flores (Spouse) [phone] -- [phone]               Discharge Summary      Pedro Priest MD at 8/25/2017  8:10 AM            Discharge Summary    Patient Name:  Denisse Flores  YOB: 1960  Medical Records Number:  [id number]    Date of Admission:  8/22/2017  Date of Discharge:  8/25/2017    Primary Discharge Diagnosis:  OA (osteoarthritis) of knee [M17.9]    Secondary Discharge Diagnosis:    Problem List Items Addressed This Visit     None          Procedures Performed:  Left Total Knee Arthroplasty      Hospital Course:    Denisse Flores is a 57 y.o.  female who underwent successful left tka on 8/22/2017.  " Denisse Flores was started on Aspirin 325mg po twice daily immediately post-operatively for DVT prophylaxis.  On post-op day 1 the patients dressing was changed and their incision was clean, with no signs of infection and their calf was soft, with no signs of DVT.  The patient progressed well with physical therapy and the patients hemoglobin remained stable. On post-operative day 3 the patient was felt ready for discharge.      Total Knee Joint Replacement Discharge Instructions:    I. ACTIVITIES:  1. Exercises:  ? Complete exercise program as taught by the hospital physical therapist 2 times per day  ? Exercise program will be advanced by the physical therapist  ? During the day be up ambulating every 2 hours (while awake) for short distances  ? Complete the ankle pump exercises at least 10 times per hour (while awake)  ? Elevate legs most of the day the first week post operatively and thereafter elevate legs when in bed and for at least 30 minutes during the day. Caution must be taken to avoid pillow placement under the bend of the knee as this can led to flexion contractures of the knee.  ? Use cold packs 20-30 minutes approximately 5 times per day. This should be done before and after completing your exercises and at any time you are experiencing pain/ stiffness in your operative extremity.      2. Activities of Daily Living:  ? No tub baths, hot tubs, or swimming pools for 4 weeks  ? May shower and let water run over the incision on post-operative day #5 if no drainage. Do not scrub or rub the incision. Simply let the water run over the incision and pat dry.    II. Restrictions  ? Do not cross legs or kneel  ? Your surgeon will discuss with you when you will be able to drive again.  ? Weight bearing is as tolerated  ? First week stay inside on even terrain. May go up and down stairs one stair at a time utilizing the hand rail  ? After one week, you may venture outside.    III. Precautions:  ? Everyone that comes  near you should wash their hands  ? No elective dental, genital-urinary, or colon procedures or surgical procedures for 12 weeks after surgery unless absolutely necessary.  ?  If dental work or surgical procedure is deemed absolutely necessary, you will need to contact your surgeon as you will need to take antibiotics 1 hour prior to any dental work (including teeth cleanings).  ? Please discuss with your surgeon prophylactic antibiotics as the length of time this intervention will be necessary for you varies with each patient’s health history and situation.  ? Avoid sick people. If you must be around someone who is ill, they should wear a mask.  ? Avoid visits to the Emergency Room or Urgent Care unless you are having a life threatening event.   ? If ordered stockings are to be placed on in the morning and removed at night. Monitor the stockings to ensure that any swelling is not causing the stockings to become too tight. In this case, remove stockings immediately.    IV. INCISION CARE:  ? Wash your hands prior to dressing changes  ? Change the dressing as needed to keep incision clean and dry. Utilize dry gauze and paper tape. Avoid touching the side of the gauze that goes against the incision with your hands.  ? No creams or ointments to the incision  ? May remove dressing once the incision is free of drainage  ? Do not touch or pick at the incision  ? Check incision every day and notify surgeon immediately if any of the following signs or symptoms are noted:  o Increase in redness  o Increase in swelling around the incision and of the entire extremity  o Increase in pain  o Drainage oozing from the incision  o Pulling apart of the edges of the incision  o Increase in overall body temperature (greater than 100.5 degrees)  ? Your surgeon will instruct you regarding suture or staple removal    V. Medications:   1. Anticoagulants: You will be discharged on an anticoagulant. This is a prophylactic medication that  helps prevent blood clots during your post-operative period. The type and length of dosage varies based on your individual needs, procedure performed, and surgeon’s preference.  ? While taking the anticoagulant, you should avoid taking any additional aspirin, ibuprofen (Advil or Motrin), Aleve (Naprosyn) or other non-steroidal anti-inflammatory medications.   ? Notify surgeon immediately if any bay bleeding is noted in the urine, stool, emesis, or from the nose or the incision. Blood in the stool will often appear as black rather than red. Blood in urine may appear as pink. Blood in emesis may appear as brown/black like coffee grounds.  ? You will need to apply pressure for longer periods of time to any cuts or abrasions to stop bleeding  ? Avoid alcohol while taking anticoagulants    2. Stool Softeners: You will be at greater risk of constipation after surgery due to being less mobile and the pain medications.   ? Take stool softeners as instructed by your surgeon while on pain medications. Over the counter Colace 100 mg 1-2 capsules twice daily.   ? If stools become too loose or too frequent, please decreases the dosage or stop the stool softener.  ? If constipation occurs despite use of stool softeners, you are to continue the stool softeners and add a laxative (Milk of Magnesia 1 ounce daily as needed)  ? Drink plenty of fluids, and eat fruits and vegetables during your recovery time    3. Pain Medications utilized after surgery are narcotics and the law requires that the following information be given to all patients that are prescribed narcotics:  ? CLASSIFICATION: Pain medications are called Opioids and are narcotics  ? LEGALITIES: It is illegal to share narcotics with others and to drive within 24 hours of taking narcotics  ? POTENTIAL SIDE EFFECTS: Potential side effects of opioids include: nausea, vomiting, itching, dizziness, drowsiness, dry mouth, constipation, and difficulty urinating.  ? POTENTIAL  ADVERSE EFFECTS:   o Opioid tolerance can develop with use of pain medications and this simply means that it requires more and more of the medication to control pain; however, this is seen more in patients that use opioids for longer periods of time.  o Opioid dependence can develop with use of Opioids and this simply means that to stop the medication can cause withdrawal symptoms; however, this is seen with patients that use Opioids for longer periods of time.  o Opioid addiction can develop with use of Opioids and the incidence of this is very unlikely in patients who take the medications as ordered and stop the medications as instructed.  o Opioid overdose can be dangerous, but is unlikely when the medication is taken as ordered and stopped when ordered. It is important not to mix opioids with alcohol or with and type of sedative such as Benadryl as this can lead to over sedation and respiratory difficulty.  ? DOSAGE:   o Pain medications will need to be taken consistently for the first week to decrease pain and promote adequate pain relief and participation in physical therapy.  o After the initial surgical pain begins to resolve, you may begin to decrease the pain medication. By the end of 6-8 weeks, you should be off of pain medications.  o Refills will not be given by the office during evening hours, on weekends, or after 6-8 weeks post-op.  o To seek refills on pain medications during the initial 6 week post-operative period, you must call the office 48 hours in advance to request the refill. The office will then notify you when to  the prescription. DO NOT wait until you are out of the medication to request a refill.    V. FOLLOW-UP VISITS:  ? You will need to follow up in the office with your surgeon in 3 weeks. Please call this number [phone] to schedule this appointment.  If you have any concerns or suspected complications prior to your follow up visit, please call your surgeons office. Do  not wait until your appointment time if you suspect complications. These will need to be addressed in the office promptly.      Discharge Medications:     1) Norco 10/325 mg  1-2 po q 4-6 hours for pain control  2)  Aspirin 325 mg po twice daily for 2 weeks, then once daily for 4 weeks.    CC:Maye Mccurdy, APRN:Pedro Priest MD     Electronically signed by Pedro Priest MD at 8/25/2017  8:11 AM

## 2019-02-01 ENCOUNTER — OFFICE VISIT (OUTPATIENT)
Dept: FAMILY MEDICINE CLINIC | Facility: CLINIC | Age: 59
End: 2019-02-01

## 2019-02-01 ENCOUNTER — RESULTS ENCOUNTER (OUTPATIENT)
Dept: FAMILY MEDICINE CLINIC | Facility: CLINIC | Age: 59
End: 2019-02-01

## 2019-02-01 VITALS
DIASTOLIC BLOOD PRESSURE: 58 MMHG | WEIGHT: 159.5 LBS | BODY MASS INDEX: 26.54 KG/M2 | OXYGEN SATURATION: 97 % | HEART RATE: 90 BPM | SYSTOLIC BLOOD PRESSURE: 108 MMHG | TEMPERATURE: 98 F

## 2019-02-01 DIAGNOSIS — M17.12 OSTEOARTHRITIS OF LEFT KNEE, UNSPECIFIED OSTEOARTHRITIS TYPE: ICD-10-CM

## 2019-02-01 DIAGNOSIS — F51.01 PRIMARY INSOMNIA: Primary | ICD-10-CM

## 2019-02-01 DIAGNOSIS — Z13.220 SCREENING FOR LIPID DISORDERS: ICD-10-CM

## 2019-02-01 DIAGNOSIS — Z11.59 NEED FOR HEPATITIS C SCREENING TEST: ICD-10-CM

## 2019-02-01 DIAGNOSIS — Z12.11 SCREENING FOR COLON CANCER: ICD-10-CM

## 2019-02-01 DIAGNOSIS — Z13.29 SCREENING FOR THYROID DISORDER: ICD-10-CM

## 2019-02-01 DIAGNOSIS — Z79.899 MEDICATION MANAGEMENT: ICD-10-CM

## 2019-02-01 DIAGNOSIS — E55.9 VITAMIN D DEFICIENCY: ICD-10-CM

## 2019-02-01 PROCEDURE — 99204 OFFICE O/P NEW MOD 45 MIN: CPT | Performed by: NURSE PRACTITIONER

## 2019-02-01 RX ORDER — AZELASTINE 1 MG/ML
2 SPRAY, METERED NASAL 2 TIMES DAILY
COMMUNITY
End: 2019-08-02

## 2019-02-01 NOTE — PROGRESS NOTES
Subjective   Denisse Flores is a 58 y.o. female presents as a new patient to establish care. Her main concern today is insomnia. She has been prescribed ambien 10 mg over the past 10 years. States her insomnia started immediately after her hysterectomy. She tried a 5 mg tablet without results. States she ran out of ambien and was up for three days unable to sleep. She has tried trazodone without relief. Recently tried klonopin and that did help.     She also reports chronic left knee pain. She had a revision almost 18 months ago. She still has pain, slow to ambulate and has decreased ROM despite physical therapy. She goes back the end of this month for a bone scan and will discuss further treatment options with her surgeon.     She has not had a colonoscopy. She is agreeable for a cologuard test. She denies family history of colon cancer, no previous rectal bleeding.  Declines flu shots. Will consider shingrix. tdap up to date.   No pap smears after hysterectomy. Mammogram up to date.       Insomnia   This is a chronic problem. The current episode started more than 1 year ago. The problem occurs daily. The problem has been unchanged. Associated symptoms include arthralgias (left knee, s/p LTK). Pertinent negatives include no abdominal pain, anorexia, change in bowel habit, chest pain, chills, congestion, coughing, diaphoresis, fatigue, fever, headaches, joint swelling, myalgias, nausea, neck pain, numbness, rash, sore throat, swollen glands, urinary symptoms, vertigo, visual change, vomiting or weakness. Nothing aggravates the symptoms. Treatments tried: ambien, trazodone, other medication she can't remember the name of. The treatment provided no relief.        The following portions of the patient's history were reviewed and updated as appropriate: allergies, current medications, past family history, past medical history, past social history, past surgical history and problem list.    Review of Systems    Constitutional: Negative for chills, diaphoresis, fatigue and fever.   HENT: Negative for congestion and sore throat.    Respiratory: Negative for cough.    Cardiovascular: Negative for chest pain.   Gastrointestinal: Negative for abdominal pain, anorexia, change in bowel habit, nausea and vomiting.   Musculoskeletal: Positive for arthralgias (left knee, s/p LTK). Negative for joint swelling, myalgias and neck pain.   Skin: Negative for rash.   Neurological: Negative for vertigo, weakness, numbness and headaches.   Psychiatric/Behavioral: Positive for sleep disturbance (chronic, n ambien). The patient has insomnia.        Objective   Physical Exam   Constitutional: She is oriented to person, place, and time. She appears well-developed and well-nourished.   HENT:   Head: Normocephalic and atraumatic.   Right Ear: Tympanic membrane and ear canal normal.   Left Ear: Tympanic membrane and ear canal normal.   Nose: Nose normal.   Mouth/Throat: Uvula is midline, oropharynx is clear and moist and mucous membranes are normal.   Eyes: Conjunctivae are normal. Pupils are equal, round, and reactive to light.   Neck: Neck supple.   Cardiovascular: Normal rate, regular rhythm and normal heart sounds. Exam reveals no gallop and no friction rub.   No murmur heard.  Pulmonary/Chest: Effort normal and breath sounds normal. No stridor. No respiratory distress. She has no wheezes.   Abdominal: Soft. Bowel sounds are normal. She exhibits no distension. There is no tenderness.   Lymphadenopathy:     She has no cervical adenopathy.   Neurological: She is alert and oriented to person, place, and time.   Skin: Skin is warm and dry.   Psychiatric: She has a normal mood and affect.   Vitals reviewed.      Assessment/Plan   Denisse was seen today for establish care.    Diagnoses and all orders for this visit:    Primary insomnia  -     Urine Drug Screen - Urine, Clean Catch    Screening for colon cancer  -     Cologuard - Stool, Per  Rectum; Future    Screening for thyroid disorder  -     TSH Rfx On Abnormal To Free T4; Future    Screening for lipid disorders  -     Lipid Panel With LDL / HDL Ratio; Future    Need for hepatitis C screening test  -     Hepatitis C antibody; Future    Vitamin D deficiency  -     Vitamin D 25 hydroxy; Future    Medication management  -     CBC & Differential; Future  -     Comprehensive metabolic panel; Future    Osteoarthritis of left knee, unspecified osteoarthritis type    Other orders  -     Discontinue: Suvorexant (BELSOMRA) 10 MG tablet; Take 10 mg by mouth At Night As Needed (insomnia).  -     Suvorexant (BELSOMRA) 10 MG tablet; Take 10 mg by mouth At Night As Needed (insomnia).    will start belsomra, 10 mg and see how pateint does. Recommend follow up in 3 months to reevaluate medication  Labs in next few weeks.  cologrd ordered  Contract signed for tamie bhatia reviewed    [facility] query complete. Treatment plan to include limited course of prescribed  controlled substance. Risks including addiction, benefits, and alternatives presented to patient.     Discussed at length sleep aids including ambien and alternatives  Discussed sleep specialist or psychology, Dr. Carias  Will try belsomra

## 2019-02-11 ENCOUNTER — RESULTS ENCOUNTER (OUTPATIENT)
Dept: FAMILY MEDICINE CLINIC | Facility: CLINIC | Age: 59
End: 2019-02-11

## 2019-02-11 DIAGNOSIS — Z13.29 SCREENING FOR THYROID DISORDER: ICD-10-CM

## 2019-02-11 DIAGNOSIS — Z13.220 SCREENING FOR LIPID DISORDERS: ICD-10-CM

## 2019-02-11 DIAGNOSIS — Z79.899 MEDICATION MANAGEMENT: ICD-10-CM

## 2019-02-11 DIAGNOSIS — E55.9 VITAMIN D DEFICIENCY: ICD-10-CM

## 2019-02-11 DIAGNOSIS — Z11.59 NEED FOR HEPATITIS C SCREENING TEST: ICD-10-CM

## 2019-02-20 ENCOUNTER — TRANSCRIBE ORDERS (OUTPATIENT)
Dept: ADMINISTRATIVE | Facility: HOSPITAL | Age: 59
End: 2019-02-20

## 2019-02-20 DIAGNOSIS — M25.562 LEFT KNEE PAIN, UNSPECIFIED CHRONICITY: Primary | ICD-10-CM

## 2019-02-21 ENCOUNTER — TELEPHONE (OUTPATIENT)
Dept: FAMILY MEDICINE CLINIC | Facility: CLINIC | Age: 59
End: 2019-02-21

## 2019-02-21 NOTE — TELEPHONE ENCOUNTER
How many hours was she sleeping vs how many hours is she sleeping now? Belsomra takes time to build up and she will notice gradual improvement, not quick, overnight results. Would increase to 20 mg and see if there is improvement. Most important thing is to give it time.

## 2019-02-21 NOTE — TELEPHONE ENCOUNTER
Denisse Flores was seen today for labs. While in office she stated that her sleeping medication is not working (BELSOMRA). She will like to know if you can give her something else. She was last seen on 2/1/19 as a new patient.

## 2019-02-22 LAB
25(OH)D3+25(OH)D2 SERPL-MCNC: 16.1 NG/ML (ref 30–100)
ALBUMIN SERPL-MCNC: 4.1 G/DL (ref 3.5–5.5)
ALBUMIN/GLOB SERPL: 1.5 {RATIO} (ref 1.2–2.2)
ALP SERPL-CCNC: 76 IU/L (ref 39–117)
ALT SERPL-CCNC: 18 IU/L (ref 0–32)
AST SERPL-CCNC: 23 IU/L (ref 0–40)
BASOPHILS # BLD AUTO: 0.1 X10E3/UL (ref 0–0.2)
BASOPHILS NFR BLD AUTO: 1 %
BILIRUB SERPL-MCNC: 0.3 MG/DL (ref 0–1.2)
BUN SERPL-MCNC: 13 MG/DL (ref 6–24)
BUN/CREAT SERPL: 17 (ref 9–23)
CALCIUM SERPL-MCNC: 9.5 MG/DL (ref 8.7–10.2)
CHLORIDE SERPL-SCNC: 105 MMOL/L (ref 96–106)
CHOLEST SERPL-MCNC: 171 MG/DL (ref 100–199)
CO2 SERPL-SCNC: 25 MMOL/L (ref 20–29)
CREAT SERPL-MCNC: 0.77 MG/DL (ref 0.57–1)
EOSINOPHIL # BLD AUTO: 0.5 X10E3/UL (ref 0–0.4)
EOSINOPHIL NFR BLD AUTO: 8 %
ERYTHROCYTE [DISTWIDTH] IN BLOOD BY AUTOMATED COUNT: 14 % (ref 12.3–15.4)
GLOBULIN SER CALC-MCNC: 2.8 G/DL (ref 1.5–4.5)
GLUCOSE SERPL-MCNC: 85 MG/DL (ref 65–99)
HCT VFR BLD AUTO: 38.7 % (ref 34–46.6)
HCV AB S/CO SERPL IA: <0.1 S/CO RATIO (ref 0–0.9)
HDLC SERPL-MCNC: 61 MG/DL
HGB BLD-MCNC: 12.7 G/DL (ref 11.1–15.9)
IMM GRANULOCYTES # BLD AUTO: 0 X10E3/UL (ref 0–0.1)
IMM GRANULOCYTES NFR BLD AUTO: 0 %
LDLC SERPL CALC-MCNC: 87 MG/DL (ref 0–99)
LDLC/HDLC SERPL: 1.4 RATIO (ref 0–3.2)
LYMPHOCYTES # BLD AUTO: 2.7 X10E3/UL (ref 0.7–3.1)
LYMPHOCYTES NFR BLD AUTO: 46 %
MCH RBC QN AUTO: 31.1 PG (ref 26.6–33)
MCHC RBC AUTO-ENTMCNC: 32.8 G/DL (ref 31.5–35.7)
MCV RBC AUTO: 95 FL (ref 79–97)
MONOCYTES # BLD AUTO: 0.7 X10E3/UL (ref 0.1–0.9)
MONOCYTES NFR BLD AUTO: 12 %
NEUTROPHILS # BLD AUTO: 1.9 X10E3/UL (ref 1.4–7)
NEUTROPHILS NFR BLD AUTO: 33 %
PLATELET # BLD AUTO: 312 X10E3/UL (ref 150–379)
POTASSIUM SERPL-SCNC: 4.5 MMOL/L (ref 3.5–5.2)
PROT SERPL-MCNC: 6.9 G/DL (ref 6–8.5)
RBC # BLD AUTO: 4.08 X10E6/UL (ref 3.77–5.28)
SODIUM SERPL-SCNC: 143 MMOL/L (ref 134–144)
TRIGL SERPL-MCNC: 116 MG/DL (ref 0–149)
TSH SERPL DL<=0.005 MIU/L-ACNC: 4.14 UIU/ML (ref 0.45–4.5)
VLDLC SERPL CALC-MCNC: 23 MG/DL (ref 5–40)
WBC # BLD AUTO: 5.9 X10E3/UL (ref 3.4–10.8)

## 2019-02-22 NOTE — TELEPHONE ENCOUNTER
Can revisit trazodone, also amitriptyline or doxepin are options.  Sleep psychology was discussed as well, Dr. Carias

## 2019-02-22 NOTE — TELEPHONE ENCOUNTER
Vivid dreams and they wake her up. They actually wake her up and scare her to death to where she doesn't want to go back to sleep

## 2019-02-25 ENCOUNTER — HOSPITAL ENCOUNTER (OUTPATIENT)
Dept: NUCLEAR MEDICINE | Facility: HOSPITAL | Age: 59
Discharge: HOME | End: 2019-02-25

## 2019-02-25 DIAGNOSIS — M25.562 LEFT KNEE PAIN, UNSPECIFIED CHRONICITY: ICD-10-CM

## 2019-02-25 PROCEDURE — 78315 BONE IMAGING 3 PHASE: CPT

## 2019-02-25 PROCEDURE — 0 TECHNETIUM MEDRONATE KIT: Performed by: ORTHOPAEDIC SURGERY

## 2019-02-25 PROCEDURE — A9503 TC99M MEDRONATE: HCPCS | Performed by: ORTHOPAEDIC SURGERY

## 2019-02-25 RX ORDER — TC 99M MEDRONATE 20 MG/10ML
19.6 INJECTION, POWDER, LYOPHILIZED, FOR SOLUTION INTRAVENOUS
Status: COMPLETED | OUTPATIENT
Start: 2019-02-25 | End: 2019-02-25

## 2019-02-25 RX ADMIN — Medication 19.6 MILLICURIE: at 08:37

## 2019-03-07 ENCOUNTER — OFFICE VISIT (OUTPATIENT)
Dept: FAMILY MEDICINE CLINIC | Facility: CLINIC | Age: 59
End: 2019-03-07

## 2019-03-07 VITALS
RESPIRATION RATE: 13 BRPM | OXYGEN SATURATION: 98 % | SYSTOLIC BLOOD PRESSURE: 102 MMHG | TEMPERATURE: 98.2 F | WEIGHT: 161 LBS | HEART RATE: 71 BPM | DIASTOLIC BLOOD PRESSURE: 70 MMHG | BODY MASS INDEX: 26.79 KG/M2

## 2019-03-07 DIAGNOSIS — G47.00 INSOMNIA, UNSPECIFIED TYPE: Primary | ICD-10-CM

## 2019-03-07 PROCEDURE — 99213 OFFICE O/P EST LOW 20 MIN: CPT | Performed by: NURSE PRACTITIONER

## 2019-03-07 RX ORDER — ZOLPIDEM TARTRATE 5 MG/1
5 TABLET ORAL NIGHTLY PRN
Qty: 30 TABLET | Refills: 0 | Status: SHIPPED | OUTPATIENT
Start: 2019-03-07 | End: 2019-08-02

## 2019-03-07 RX ORDER — TRAMADOL HYDROCHLORIDE 50 MG/1
100 TABLET ORAL EVERY 6 HOURS PRN
COMMUNITY
End: 2020-09-30

## 2019-03-07 RX ORDER — CHLORAL HYDRATE 500 MG
1000 CAPSULE ORAL 2 TIMES DAILY WITH MEALS
COMMUNITY

## 2019-03-07 NOTE — PROGRESS NOTES
Subjective   Denisse Flores is a 58 y.o. female presents for follow up insomnia. Started on belsomra. Had increased dreams, very vivid and realistic dreams. Tried 10 days, unable to sleep.     18 months still having pain, normal bone scan, given tramadol, now scheduled to have allergy testing, if allergy related, will try new replacement.     Insomnia   This is a chronic problem. The current episode started more than 1 month ago. The problem occurs daily. The problem has been rapidly worsening. Associated symptoms include fatigue. Pertinent negatives include no abdominal pain, anorexia, arthralgias, change in bowel habit, chest pain, chills, congestion, coughing, diaphoresis, fever, headaches, joint swelling, myalgias, nausea, neck pain, numbness, rash, sore throat, swollen glands, urinary symptoms, vertigo, visual change, vomiting or weakness. Nothing aggravates the symptoms. Treatments tried: ambien, klonopin, belsomra. The treatment provided moderate relief.        The following portions of the patient's history were reviewed and updated as appropriate: allergies, current medications, past family history, past medical history, past social history, past surgical history and problem list.    Review of Systems   Constitutional: Positive for fatigue. Negative for chills, diaphoresis and fever.   HENT: Negative.  Negative for congestion and sore throat.    Eyes: Negative.    Respiratory: Negative.  Negative for cough.    Cardiovascular: Negative.  Negative for chest pain.   Gastrointestinal: Negative.  Negative for abdominal pain, anorexia, change in bowel habit, nausea and vomiting.   Endocrine: Negative.    Genitourinary: Negative.    Musculoskeletal: Negative.  Negative for arthralgias, joint swelling, myalgias and neck pain.   Skin: Negative.  Negative for rash.   Allergic/Immunologic: Negative.    Neurological: Negative.  Negative for vertigo, weakness, numbness and headaches.   Hematological: Negative.     Psychiatric/Behavioral: Positive for sleep disturbance. The patient has insomnia.        Objective   Physical Exam   Constitutional: She is oriented to person, place, and time. She appears well-developed and well-nourished. No distress.   Neck: Neck supple.   Cardiovascular: Normal rate, regular rhythm and normal heart sounds. Exam reveals no gallop and no friction rub.   No murmur heard.  Pulmonary/Chest: Effort normal and breath sounds normal. No respiratory distress. She has no wheezes. She has no rales.   Abdominal: Soft. Bowel sounds are normal. She exhibits no distension. There is no tenderness.   Neurological: She is alert and oriented to person, place, and time.   Skin: Skin is warm and dry. She is not diaphoretic.   Psychiatric: She has a normal mood and affect.       Assessment/Plan   Denisse was seen today for follow-up.    Diagnoses and all orders for this visit:    Insomnia, unspecified type    Other orders  -     zolpidem (AMBIEN) 5 MG tablet; Take 1 tablet by mouth At Night As Needed for Sleep.    RAH query complete. Treatment plan to include limited course of prescribed  controlled substance. Risks including addiction, benefits, and alternatives presented to patient.     Will resume ambien 5 mg daily  Discussed max dose for females is 5 mg  Will need to reevaluate moving forward other treatment options

## 2019-07-30 ENCOUNTER — OFFICE VISIT (OUTPATIENT)
Dept: FAMILY MEDICINE CLINIC | Facility: CLINIC | Age: 59
End: 2019-07-30

## 2019-07-30 VITALS
BODY MASS INDEX: 26.28 KG/M2 | HEART RATE: 79 BPM | SYSTOLIC BLOOD PRESSURE: 115 MMHG | DIASTOLIC BLOOD PRESSURE: 78 MMHG | WEIGHT: 157.9 LBS | TEMPERATURE: 98 F | OXYGEN SATURATION: 97 % | RESPIRATION RATE: 14 BRPM

## 2019-07-30 DIAGNOSIS — E55.9 VITAMIN D DEFICIENCY: Primary | ICD-10-CM

## 2019-07-30 DIAGNOSIS — G47.00 INSOMNIA, UNSPECIFIED TYPE: ICD-10-CM

## 2019-07-30 DIAGNOSIS — E78.5 HYPERLIPIDEMIA, UNSPECIFIED HYPERLIPIDEMIA TYPE: ICD-10-CM

## 2019-07-30 DIAGNOSIS — Z79.899 MEDICATION MANAGEMENT: ICD-10-CM

## 2019-07-30 PROCEDURE — 99213 OFFICE O/P EST LOW 20 MIN: CPT | Performed by: NURSE PRACTITIONER

## 2019-07-30 RX ORDER — AMOXICILLIN 500 MG/1
CAPSULE ORAL
Refills: 0 | COMMUNITY
Start: 2019-07-24 | End: 2019-08-02

## 2019-07-30 RX ORDER — ZOLPIDEM TARTRATE 10 MG/1
10 TABLET ORAL
COMMUNITY
End: 2019-08-02

## 2019-07-30 RX ORDER — AMITRIPTYLINE HYDROCHLORIDE 10 MG/1
TABLET, FILM COATED ORAL
Qty: 30 TABLET | Refills: 0 | Status: SHIPPED | OUTPATIENT
Start: 2019-07-30 | End: 2019-07-30 | Stop reason: SDUPTHER

## 2019-07-30 NOTE — PROGRESS NOTES
Gael Flores is a 59 y.o. female presents for follow up for insomnia. Has tried multiple medications for sleep without success.  is sleeping approximately 2.5-3 hours nightly. Has tried melatonin, trazodone, belsomra, and ambien. States ambien 5 was not as successful in helping her feel well rested, but worked better than other medications. She was unable to tolerate belsomra or trazodone. Melatonin did not do anything. She has not tried elavil. She is scheduled to have repeat knee surgery in a few weeks.     She would like to repeat labs in the next week, prior to her surgery so she is up to date on everything. Denies need for medication refills at present.     Insomnia   This is a chronic problem. The current episode started more than 1 year ago. The problem occurs daily. The problem has been gradually worsening. Associated symptoms include arthralgias (chronic left knee pain) and fatigue. Pertinent negatives include no abdominal pain, anorexia, change in bowel habit, chest pain, chills, congestion, coughing, diaphoresis, fever, headaches, joint swelling, myalgias, nausea, neck pain, numbness, rash, sore throat, swollen glands, urinary symptoms, vertigo, visual change, vomiting or weakness. Nothing aggravates the symptoms. Treatments tried: ambien, trazodone, belsomra, melatonin, zquil. The treatment provided moderate relief.        The following portions of the patient's history were reviewed and updated as appropriate: allergies, current medications, past family history, past medical history, past social history, past surgical history and problem list.    Review of Systems   Constitutional: Positive for fatigue. Negative for chills, diaphoresis and fever.   HENT: Negative for congestion and sore throat.    Respiratory: Negative for cough.    Cardiovascular: Negative for chest pain.   Gastrointestinal: Negative for abdominal pain, anorexia, change in bowel habit, nausea and vomiting.    Musculoskeletal: Positive for arthralgias (chronic left knee pain). Negative for joint swelling, myalgias and neck pain.   Skin: Negative for rash.   Neurological: Negative for vertigo, weakness, numbness and headaches.   Psychiatric/Behavioral: The patient has insomnia.        Objective   Physical Exam   Constitutional: She is oriented to person, place, and time. She appears well-developed and well-nourished. No distress.   Neurological: She is alert and oriented to person, place, and time.   Skin: Skin is warm and dry. She is not diaphoretic.   Psychiatric: She has a normal mood and affect.   Vitals reviewed.      Assessment/Plan   Denisse was seen today for med refill.    Diagnoses and all orders for this visit:    Vitamin D deficiency  -     Vitamin D 25 hydroxy    Hyperlipidemia, unspecified hyperlipidemia type  -     Lipid Panel With LDL / HDL Ratio    Medication management  -     Comprehensive metabolic panel  -     CBC & Differential    Insomnia, unspecified type    Other orders  -     amitriptyline (ELAVIL) 10 MG tablet; Take 1-3 tab po qhs insomnia    will try elavil, instructed can take 1-3 tablets, adjust dose as needed  Will consider ambien for no improvement with sleep  Would like patient to follow up with sleep specialist  Labs in next week, follow up in six months, sooner if needed

## 2019-07-31 RX ORDER — AMITRIPTYLINE HYDROCHLORIDE 10 MG/1
TABLET, FILM COATED ORAL
Qty: 270 TABLET | Refills: 0 | Status: SHIPPED | OUTPATIENT
Start: 2019-07-31 | End: 2019-12-03 | Stop reason: SDUPTHER

## 2019-08-02 ENCOUNTER — HOSPITAL ENCOUNTER (OUTPATIENT)
Dept: GENERAL RADIOLOGY | Facility: HOSPITAL | Age: 59
Discharge: HOME | End: 2019-08-02

## 2019-08-02 ENCOUNTER — HOSPITAL ENCOUNTER (OUTPATIENT)
Dept: GENERAL RADIOLOGY | Facility: HOSPITAL | Age: 59
Discharge: HOME | End: 2019-08-02
Admitting: ORTHOPAEDIC SURGERY

## 2019-08-02 ENCOUNTER — APPOINTMENT (OUTPATIENT)
Dept: PREADMISSION TESTING | Facility: HOSPITAL | Age: 59
End: 2019-08-02

## 2019-08-02 VITALS
SYSTOLIC BLOOD PRESSURE: 108 MMHG | TEMPERATURE: 98.7 F | HEIGHT: 65 IN | HEART RATE: 85 BPM | OXYGEN SATURATION: 97 % | DIASTOLIC BLOOD PRESSURE: 72 MMHG | WEIGHT: 155 LBS | BODY MASS INDEX: 25.83 KG/M2 | RESPIRATION RATE: 18 BRPM

## 2019-08-02 LAB
25(OH)D3+25(OH)D2 SERPL-MCNC: 18.6 NG/ML (ref 30–100)
ALBUMIN SERPL-MCNC: 4.2 G/DL (ref 3.5–5.2)
ALBUMIN SERPL-MCNC: 4.4 G/DL (ref 3.5–5.2)
ALBUMIN/GLOB SERPL: 1.1 G/DL
ALBUMIN/GLOB SERPL: 1.5 G/DL
ALP SERPL-CCNC: 78 U/L (ref 39–117)
ALP SERPL-CCNC: 79 U/L (ref 39–117)
ALT SERPL W P-5'-P-CCNC: 20 U/L (ref 1–33)
ALT SERPL-CCNC: 15 U/L (ref 1–33)
ANION GAP SERPL CALCULATED.3IONS-SCNC: 10.9 MMOL/L (ref 5–15)
AST SERPL-CCNC: 23 U/L (ref 1–32)
AST SERPL-CCNC: 23 U/L (ref 1–32)
BACTERIA UR QL AUTO: ABNORMAL /HPF
BASOPHILS # BLD AUTO: 0.07 10*3/MM3 (ref 0–0.2)
BASOPHILS NFR BLD AUTO: 1 % (ref 0–1.5)
BILIRUB SERPL-MCNC: 0.3 MG/DL (ref 0.2–1.2)
BILIRUB SERPL-MCNC: 0.4 MG/DL (ref 0.2–1.2)
BILIRUB UR QL STRIP: NEGATIVE
BUN BLD-MCNC: 11 MG/DL (ref 6–20)
BUN SERPL-MCNC: 14 MG/DL (ref 6–20)
BUN/CREAT SERPL: 14.9 (ref 7–25)
BUN/CREAT SERPL: 19.4 (ref 7–25)
CALCIUM SERPL-MCNC: 9.5 MG/DL (ref 8.6–10.5)
CALCIUM SPEC-SCNC: 9.8 MG/DL (ref 8.6–10.5)
CHLORIDE SERPL-SCNC: 101 MMOL/L (ref 98–107)
CHLORIDE SERPL-SCNC: 101 MMOL/L (ref 98–107)
CHOLEST SERPL-MCNC: 189 MG/DL (ref 0–200)
CLARITY UR: CLEAR
CO2 SERPL-SCNC: 25.1 MMOL/L (ref 22–29)
CO2 SERPL-SCNC: 28.8 MMOL/L (ref 22–29)
COLOR UR: YELLOW
CREAT BLD-MCNC: 0.74 MG/DL (ref 0.57–1)
CREAT SERPL-MCNC: 0.72 MG/DL (ref 0.57–1)
CRP SERPL-MCNC: 0.27 MG/DL (ref 0–0.5)
DEPRECATED RDW RBC AUTO: 46.9 FL (ref 37–54)
EOSINOPHIL # BLD AUTO: 0.24 10*3/MM3 (ref 0–0.4)
EOSINOPHIL NFR BLD AUTO: 3.4 % (ref 0.3–6.2)
ERYTHROCYTE [DISTWIDTH] IN BLOOD BY AUTOMATED COUNT: 13 % (ref 12.3–15.4)
ERYTHROCYTE [DISTWIDTH] IN BLOOD BY AUTOMATED COUNT: 13.4 % (ref 12.3–15.4)
ERYTHROCYTE [SEDIMENTATION RATE] IN BLOOD: 19 MM/HR (ref 0–30)
GFR SERPL CREATININE-BSD FRML MDRD: 81 ML/MIN/1.73
GLOBULIN SER CALC-MCNC: 2.9 GM/DL
GLOBULIN UR ELPH-MCNC: 3.9 GM/DL
GLUCOSE BLD-MCNC: 104 MG/DL (ref 65–99)
GLUCOSE SERPL-MCNC: 86 MG/DL (ref 65–99)
GLUCOSE UR STRIP-MCNC: NEGATIVE MG/DL
HCT VFR BLD AUTO: 42.4 % (ref 34–46.6)
HCT VFR BLD AUTO: 43.1 % (ref 34–46.6)
HDLC SERPL-MCNC: 65 MG/DL (ref 40–60)
HGB BLD-MCNC: 13.7 G/DL (ref 12–15.9)
HGB BLD-MCNC: 13.7 G/DL (ref 12–15.9)
HGB UR QL STRIP.AUTO: NEGATIVE
HYALINE CASTS UR QL AUTO: ABNORMAL /LPF
IMM GRANULOCYTES # BLD AUTO: 0.02 10*3/MM3 (ref 0–0.05)
IMM GRANULOCYTES NFR BLD AUTO: 0.3 % (ref 0–0.5)
KETONES UR QL STRIP: NEGATIVE
LDLC SERPL CALC-MCNC: 93 MG/DL (ref 0–100)
LDLC/HDLC SERPL: 1.43 {RATIO}
LEUKOCYTE ESTERASE UR QL STRIP.AUTO: NEGATIVE
LYMPHOCYTES # BLD AUTO: 2.02 10*3/MM3 (ref 0.7–3.1)
LYMPHOCYTES NFR BLD AUTO: 28.3 % (ref 19.6–45.3)
MCH RBC QN AUTO: 31.4 PG (ref 26.6–33)
MCH RBC QN AUTO: 32 PG (ref 26.6–33)
MCHC RBC AUTO-ENTMCNC: 31.8 G/DL (ref 31.5–35.7)
MCHC RBC AUTO-ENTMCNC: 32.3 G/DL (ref 31.5–35.7)
MCV RBC AUTO: 100.7 FL (ref 79–97)
MCV RBC AUTO: 97.2 FL (ref 79–97)
MONOCYTES # BLD AUTO: 0.69 10*3/MM3 (ref 0.1–0.9)
MONOCYTES NFR BLD AUTO: 9.7 % (ref 5–12)
NEUTROPHILS # BLD AUTO: 4.11 10*3/MM3 (ref 1.7–7)
NEUTROPHILS NFR BLD AUTO: 57.3 % (ref 42.7–76)
NITRITE UR QL STRIP: NEGATIVE
NRBC BLD AUTO-RTO: 0 /100 WBC (ref 0–0.2)
PH UR STRIP.AUTO: <=5 [PH] (ref 5–8)
PLATELET # BLD AUTO: 331 10*3/MM3 (ref 140–450)
PLATELET # BLD AUTO: 335 10*3/MM3 (ref 140–450)
PMV BLD AUTO: 10 FL (ref 6–12)
POTASSIUM BLD-SCNC: 3.9 MMOL/L (ref 3.5–5.2)
POTASSIUM SERPL-SCNC: 4.9 MMOL/L (ref 3.5–5.2)
PROT SERPL-MCNC: 7.3 G/DL (ref 6–8.5)
PROT SERPL-MCNC: 8.1 G/DL (ref 6–8.5)
PROT UR QL STRIP: NEGATIVE
RBC # BLD AUTO: 4.28 10*6/MM3 (ref 3.77–5.28)
RBC # BLD AUTO: 4.36 10*6/MM3 (ref 3.77–5.28)
RBC # UR: ABNORMAL /HPF
REF LAB TEST METHOD: ABNORMAL
SODIUM BLD-SCNC: 137 MMOL/L (ref 136–145)
SODIUM SERPL-SCNC: 141 MMOL/L (ref 136–145)
SP GR UR STRIP: 1.01 (ref 1–1.03)
SQUAMOUS #/AREA URNS HPF: ABNORMAL /HPF
TRIGL SERPL-MCNC: 154 MG/DL (ref 0–150)
UROBILINOGEN UR QL STRIP: NORMAL
VLDLC SERPL CALC-MCNC: 30.8 MG/DL
WBC # BLD AUTO: 7.15 10*3/MM3 (ref 3.4–10.8)
WBC NRBC COR # BLD: 6.54 10*3/MM3 (ref 3.4–10.8)
WBC UR QL AUTO: ABNORMAL /HPF

## 2019-08-02 PROCEDURE — 86140 C-REACTIVE PROTEIN: CPT | Performed by: ORTHOPAEDIC SURGERY

## 2019-08-02 PROCEDURE — 36415 COLL VENOUS BLD VENIPUNCTURE: CPT

## 2019-08-02 PROCEDURE — 71046 X-RAY EXAM CHEST 2 VIEWS: CPT

## 2019-08-02 PROCEDURE — 85652 RBC SED RATE AUTOMATED: CPT | Performed by: ORTHOPAEDIC SURGERY

## 2019-08-02 PROCEDURE — 80053 COMPREHEN METABOLIC PANEL: CPT | Performed by: ORTHOPAEDIC SURGERY

## 2019-08-02 PROCEDURE — 93005 ELECTROCARDIOGRAM TRACING: CPT

## 2019-08-02 PROCEDURE — 85027 COMPLETE CBC AUTOMATED: CPT | Performed by: ORTHOPAEDIC SURGERY

## 2019-08-02 PROCEDURE — 81001 URINALYSIS AUTO W/SCOPE: CPT | Performed by: ORTHOPAEDIC SURGERY

## 2019-08-02 PROCEDURE — 93010 ELECTROCARDIOGRAM REPORT: CPT | Performed by: INTERNAL MEDICINE

## 2019-08-02 PROCEDURE — 73560 X-RAY EXAM OF KNEE 1 OR 2: CPT

## 2019-08-02 RX ORDER — ASCORBIC ACID 500 MG
500 TABLET ORAL DAILY
COMMUNITY

## 2019-08-02 RX ORDER — CHLORHEXIDINE GLUCONATE 500 MG/1
CLOTH TOPICAL
COMMUNITY
End: 2019-08-17 | Stop reason: HOSPADM

## 2019-08-02 RX ORDER — AMOXICILLIN 500 MG/1
1000 CAPSULE ORAL 2 TIMES DAILY
COMMUNITY
End: 2019-10-18

## 2019-08-02 ASSESSMENT — KOOS JR
KOOS JR SCORE: 14
KOOS JR SCORE: 52.465

## 2019-08-02 NOTE — PROGRESS NOTES
Pre Op Ortho Assessment    [facility]     Patient Name: Denisse Flores  MRN: [id number]  Today's Date: 8/2/2019        PRE-OPERATIVE ORTHOPEDIC ASSESSMENT     Row Name 08/02/19 1551       Question    What is your age group?  2    Gender?  1    How far on average can you walk? (a block is 200 meters)  1    Which gait aid do you use? (more often than not)  2    Do you use community supports: (home help, meals on wheels, district nursing)  1    Will you live with someone who can care for you after your operation?  3    Your Score (out of 12)  10       Discharge Disposition/Planning:    Discussed the predicted discharge disposition needed based on RAPT Assessment with the patient  Yes    Patient Selected Discharge Disposition:  Home LIVES IN A 1ST FLOOR APT WITH NO STEP INTO OR INSIDE OF APT.     Outpatient Rehabilitation Facility of Choice:  other *see comment [facility] [address]     Home Health Services Preferred:  Other *see comment [facility]    Post-operative Caregiver Name and Phone Number  Spouse KENNEDY - [phone]       Home Equipment    Does patient have a walker for home use?  Yes    Does patient have a 3 in 1 commode for home use?  Yes    Does patient have a shower chair for home use?  Yes    Does patient have an elevated commode seat for home use?  No    Does patient have a cane for home use?  Yes    Is there any other medical equipment in the home?  No       Pre-Operative Class Attendance    Attended or scheduled to attend the pre-operative class within 1 year of total joint replacement?  No HAD SAME KNEE DONE 2 YEARS AGO AND DOESN'T FEEL THE NEED TO GO AGAIN.        Patient Education    Expected time of discharge discussed  Yes    Encouraged to attend pre-operative class  Yes    Education regarding predicted discharge disposition based on RAPT score  Yes    Patient receptive and voiced understanding of information given  Yes            Vern Anne LPN

## 2019-08-02 NOTE — DISCHARGE INSTRUCTIONS
ARRIVE DAY OF SURGERY AT 10:30 AM TO MAIN SURGERY      Take the following medications the morning of surgery with a small sip of water:    NONE    General Instructions:  • Do not eat solid food after midnight the night before surgery.  • You may drink clear liquids day of surgery but must stop at least one hour before your hospital arrival time.  • It is beneficial for you to have a clear drink that contains carbohydrates the day of surgery.  We suggest a 12 to 20 ounce bottle of Gatorade or Powerade for non-diabetic patients or a 12 to 20 ounce bottle of G2 or Powerade Zero for diabetic patients. (Pediatric patients, are not advised to drink a 12 to 20 ounce carbohydrate drink)    Clear liquids are liquids you can see through.  Nothing red in color.     Plain water                               Sports drinks  Sodas                                   Gelatin (Jell-O)  Fruit juices without pulp such as white grape juice and apple juice  Popsicles that contain no fruit or yogurt  Tea or coffee (no cream or milk added)  Gatorade / Powerade  G2 / Powerade Zero    • Infants may have breast milk up to four hours before surgery.  • Infants drinking formula may drink formula up to six hours before surgery.   • Patients who avoid smoking, chewing tobacco and alcohol for 4 weeks prior to surgery have a reduced risk of post-operative complications.  Quit smoking as many days before surgery as you can.  • Do not smoke, use chewing tobacco or drink alcohol the day of surgery.   • If applicable bring your C-PAP/ BI-PAP machine.  • Bring any papers given to you in the doctor’s office.  • Wear clean comfortable clothes and socks.  • Do not wear contact lenses, false eyelashes or make-up.  Bring a case for your glasses.   • Bring crutches or walker if applicable.  • Remove all piercings.  Leave jewelry and any other valuables at home.  • Hair extensions with metal clips must be removed prior to surgery.  • The Pre-Admission Testing  nurse will instruct you to bring medications if unable to obtain an accurate list in Pre-Admission Testing.            Preventing a Surgical Site Infection:  • For 2 to 3 days before surgery, avoid shaving with a razor because the razor can irritate skin and make it easier to develop an infection.    • Any areas of open skin can increase the risk of a post-operative wound infection by allowing bacteria to enter and travel throughout the body.  Notify your surgeon if you have any skin wounds / rashes even if it is not near the expected surgical site.  The area will need assessed to determine if surgery should be delayed until it is healed.  • The night prior to surgery sleep in a clean bed with clean clothing.  Do not allow pets to sleep with you.  • Shower on the morning of surgery using a fresh bar of anti-bacterial soap (such as Dial) and clean washcloth.  Dry with a clean towel and dress in clean clothing.  • Ask your surgeon if you will be receiving antibiotics prior to surgery.  • Make sure you, your family, and all healthcare providers clean their hands with soap and water or an alcohol based hand  before caring for you or your wound.    Day of surgery:  Upon arrival, a Pre-op nurse and Anesthesiologist will review your health history, obtain vital signs, and answer questions you may have.  The only belongings needed at this time will be a list of your home medications and if applicable your C-PAP/BI-PAP machine.  If you are staying overnight your family can leave the rest of your belongings in the car and bring them to your room later.  A Pre-op nurse will start an IV and you may receive medication in preparation for surgery, including something to help you relax.  Your family will be able to see you in the Pre-op area.  While you are in surgery your family should notify the waiting room  if they leave the waiting room area and provide a contact phone number.    Please be aware that  surgery does come with discomfort.  We want to make every effort to control your discomfort so please discuss any uncontrolled symptoms with your nurse.   Your doctor will most likely have prescribed pain medications.      If you are going home after surgery you will receive individualized written care instructions before being discharged.  A responsible adult must drive you to and from the hospital on the day of your surgery and stay with you for 24 hours.    If you are staying overnight following surgery, you will be transported to your hospital room following the recovery period.  [facility] has all private rooms.    You have received a list of surgical assistants for your reference.  If you have any questions please call Pre-Admission Testing at [phone].  Deductibles and co-payments are collected on the day of service. Please be prepared to pay the required co-pay, deductible or deposit on the day of service as defined by your plan.    2% CHLORAHEXIDINE GLUCONATE* CLOTH  Preparing or “prepping” skin before surgery can reduce the risk of infection at the surgical site. To make the process easier, [facility] has chosen disposable cloths moistened with a rinse-free, 2% Chlorhexidine Gluconate (CHG) antiseptic solution. The steps below outline the prepping process and should be carefully followed.        Use the prep cloth on the area that is circled in the diagram             Directions Night before Surgery  1) Shower using a fresh bar of anti-bacterial soap (such as Dial) and clean washcloth.  Use a clean towel to completely dry your skin.  2) Do not use any lotions, oils or creams on your skin.  3) Open the package and remove 1 cloth, wipe your skin for 30 seconds in a circular motion.  Allow to dry for 3 minutes.  4) Repeat #3 with second cloth.  5) Do not touch your eyes, ears, or mouth with the prep cloth.  6) Allow the wet prep solution to air dry.  7) Discard the prep cloth and  wash your hands with soap and water.   8) Dress in clean bed clothes and sleep on fresh clean bed sheets.   9) You may experience some temporary itching after the prep.    Directions Day of Surgery  1) Repeat steps 1,2,3,4,5,6,7, and 9.   2) Dress in clean clothes before coming to the hospital.    BACTROBAN NASAL OINTMENT  There are many germs normally in your nose. Bactroban is an ointment that will help reduce these germs. Please follow these instructions for Bactroban use:      ____The day before surgery in the morning  Date________    ____The day before surgery in the evening              Date________    ____The day of surgery in the morning    Date________    **Squirt ½ package of Bactroban Ointment onto a cotton applicator and apply to inside of 1st nostril.  Squirt the remaining Bactroban and apply to the inside of the other nostril.

## 2019-08-07 RX ORDER — ERGOCALCIFEROL 1.25 MG/1
50000 CAPSULE ORAL WEEKLY
Qty: 12 CAPSULE | Refills: 1 | Status: SHIPPED | OUTPATIENT
Start: 2019-08-07 | End: 2020-09-30

## 2019-08-15 ENCOUNTER — ANESTHESIA EVENT (OUTPATIENT)
Dept: PERIOP | Facility: HOSPITAL | Age: 59
End: 2019-08-15

## 2019-08-15 ENCOUNTER — ANESTHESIA (OUTPATIENT)
Dept: PERIOP | Facility: HOSPITAL | Age: 59
End: 2019-08-15

## 2019-08-15 ENCOUNTER — HOSPITAL ENCOUNTER (INPATIENT)
Facility: HOSPITAL | Age: 59
LOS: 2 days | Discharge: HOME HEALTH | End: 2019-08-17
Attending: ORTHOPAEDIC SURGERY | Admitting: ORTHOPAEDIC SURGERY

## 2019-08-15 ENCOUNTER — APPOINTMENT (OUTPATIENT)
Dept: GENERAL RADIOLOGY | Facility: HOSPITAL | Age: 59
End: 2019-08-15

## 2019-08-15 DIAGNOSIS — T85.848A PAIN DUE TO ANY DEVICE, IMPLANT OR GRAFT: ICD-10-CM

## 2019-08-15 PROCEDURE — 25010000003 MEPIVACAINE PER 10 ML: Performed by: ANESTHESIOLOGY

## 2019-08-15 PROCEDURE — 25010000002 KETOROLAC TROMETHAMINE PER 15 MG: Performed by: ORTHOPAEDIC SURGERY

## 2019-08-15 PROCEDURE — 73560 X-RAY EXAM OF KNEE 1 OR 2: CPT

## 2019-08-15 PROCEDURE — 87070 CULTURE OTHR SPECIMN AEROBIC: CPT | Performed by: ORTHOPAEDIC SURGERY

## 2019-08-15 PROCEDURE — 25010000002 ROPIVACAINE PER 1 MG: Performed by: ANESTHESIOLOGY

## 2019-08-15 PROCEDURE — 0SUW09Z SUPPLEMENT LEFT KNEE JOINT, TIBIAL SURFACE WITH LINER, OPEN APPROACH: ICD-10-PCS | Performed by: ORTHOPAEDIC SURGERY

## 2019-08-15 PROCEDURE — 25010000002 FENTANYL CITRATE (PF) 100 MCG/2ML SOLUTION: Performed by: ANESTHESIOLOGY

## 2019-08-15 PROCEDURE — 87205 SMEAR GRAM STAIN: CPT | Performed by: ORTHOPAEDIC SURGERY

## 2019-08-15 PROCEDURE — 97162 PT EVAL MOD COMPLEX 30 MIN: CPT

## 2019-08-15 PROCEDURE — 25010000002 PROPOFOL 10 MG/ML EMULSION: Performed by: ANESTHESIOLOGY

## 2019-08-15 PROCEDURE — C1713 ANCHOR/SCREW BN/BN,TIS/BN: HCPCS | Performed by: ORTHOPAEDIC SURGERY

## 2019-08-15 PROCEDURE — 97110 THERAPEUTIC EXERCISES: CPT

## 2019-08-15 PROCEDURE — 25010000003 CEFAZOLIN IN DEXTROSE 2-4 GM/100ML-% SOLUTION: Performed by: ANESTHESIOLOGY

## 2019-08-15 PROCEDURE — 25010000002 MORPHINE (PF) 10 MG/ML SOLUTION 1 ML VIAL: Performed by: ORTHOPAEDIC SURGERY

## 2019-08-15 PROCEDURE — 25010000002 ROPIVACAINE PER 1 MG: Performed by: ORTHOPAEDIC SURGERY

## 2019-08-15 PROCEDURE — 25010000002 HYDROMORPHONE PER 4 MG: Performed by: ANESTHESIOLOGY

## 2019-08-15 PROCEDURE — 25010000002 MIDAZOLAM PER 1 MG: Performed by: ANESTHESIOLOGY

## 2019-08-15 PROCEDURE — 0SBD0ZZ EXCISION OF LEFT KNEE JOINT, OPEN APPROACH: ICD-10-PCS | Performed by: ORTHOPAEDIC SURGERY

## 2019-08-15 PROCEDURE — 0SPD09Z REMOVAL OF LINER FROM LEFT KNEE JOINT, OPEN APPROACH: ICD-10-PCS | Performed by: ORTHOPAEDIC SURGERY

## 2019-08-15 PROCEDURE — C1776 JOINT DEVICE (IMPLANTABLE): HCPCS | Performed by: ORTHOPAEDIC SURGERY

## 2019-08-15 DEVICE — IMPLANTABLE DEVICE: Type: IMPLANTABLE DEVICE | Site: KNEE | Status: FUNCTIONAL

## 2019-08-15 DEVICE — BAR TIB ASCENT/MAXIM PRI INTERLOK: Type: IMPLANTABLE DEVICE | Site: KNEE | Status: FUNCTIONAL

## 2019-08-15 RX ORDER — SENNA AND DOCUSATE SODIUM 50; 8.6 MG/1; MG/1
2 TABLET, FILM COATED ORAL 2 TIMES DAILY PRN
Status: DISCONTINUED | OUTPATIENT
Start: 2019-08-15 | End: 2019-08-17 | Stop reason: HOSPADM

## 2019-08-15 RX ORDER — CEFAZOLIN SODIUM 2 G/100ML
2 INJECTION, SOLUTION INTRAVENOUS ONCE
Status: DISCONTINUED | OUTPATIENT
Start: 2019-08-15 | End: 2019-08-17 | Stop reason: HOSPADM

## 2019-08-15 RX ORDER — OXYCODONE HYDROCHLORIDE AND ACETAMINOPHEN 5; 325 MG/1; MG/1
2 TABLET ORAL EVERY 4 HOURS PRN
Status: DISCONTINUED | OUTPATIENT
Start: 2019-08-15 | End: 2019-08-17

## 2019-08-15 RX ORDER — CEFAZOLIN SODIUM 2 G/100ML
INJECTION, SOLUTION INTRAVENOUS AS NEEDED
Status: DISCONTINUED | OUTPATIENT
Start: 2019-08-15 | End: 2019-08-15 | Stop reason: SURG

## 2019-08-15 RX ORDER — SODIUM CHLORIDE 0.9 % (FLUSH) 0.9 %
3 SYRINGE (ML) INJECTION EVERY 12 HOURS SCHEDULED
Status: DISCONTINUED | OUTPATIENT
Start: 2019-08-15 | End: 2019-08-17 | Stop reason: HOSPADM

## 2019-08-15 RX ORDER — FAMOTIDINE 10 MG/ML
20 INJECTION, SOLUTION INTRAVENOUS ONCE
Status: COMPLETED | OUTPATIENT
Start: 2019-08-15 | End: 2019-08-15

## 2019-08-15 RX ORDER — CHOLECALCIFEROL (VITAMIN D3) 125 MCG
5 CAPSULE ORAL NIGHTLY PRN
Status: DISCONTINUED | OUTPATIENT
Start: 2019-08-15 | End: 2019-08-17 | Stop reason: HOSPADM

## 2019-08-15 RX ORDER — PROPOFOL 10 MG/ML
VIAL (ML) INTRAVENOUS AS NEEDED
Status: DISCONTINUED | OUTPATIENT
Start: 2019-08-15 | End: 2019-08-15 | Stop reason: SURG

## 2019-08-15 RX ORDER — ATORVASTATIN CALCIUM 10 MG/1
10 TABLET, FILM COATED ORAL NIGHTLY
Status: DISCONTINUED | OUTPATIENT
Start: 2019-08-15 | End: 2019-08-17 | Stop reason: HOSPADM

## 2019-08-15 RX ORDER — MIDAZOLAM HYDROCHLORIDE 1 MG/ML
1 INJECTION INTRAMUSCULAR; INTRAVENOUS
Status: DISCONTINUED | OUTPATIENT
Start: 2019-08-15 | End: 2019-08-15 | Stop reason: HOSPADM

## 2019-08-15 RX ORDER — LIDOCAINE HYDROCHLORIDE 20 MG/ML
INJECTION, SOLUTION INFILTRATION; PERINEURAL AS NEEDED
Status: DISCONTINUED | OUTPATIENT
Start: 2019-08-15 | End: 2019-08-15 | Stop reason: SURG

## 2019-08-15 RX ORDER — TRAMADOL HYDROCHLORIDE 50 MG/1
100 TABLET ORAL EVERY 6 HOURS PRN
Status: DISCONTINUED | OUTPATIENT
Start: 2019-08-15 | End: 2019-08-17 | Stop reason: HOSPADM

## 2019-08-15 RX ORDER — ACETAMINOPHEN 325 MG/1
325 TABLET ORAL EVERY 4 HOURS PRN
Status: DISCONTINUED | OUTPATIENT
Start: 2019-08-15 | End: 2019-08-17 | Stop reason: HOSPADM

## 2019-08-15 RX ORDER — FENTANYL CITRATE 50 UG/ML
50 INJECTION, SOLUTION INTRAMUSCULAR; INTRAVENOUS
Status: DISCONTINUED | OUTPATIENT
Start: 2019-08-15 | End: 2019-08-15 | Stop reason: HOSPADM

## 2019-08-15 RX ORDER — SODIUM CHLORIDE 0.9 % (FLUSH) 0.9 %
1-10 SYRINGE (ML) INJECTION AS NEEDED
Status: DISCONTINUED | OUTPATIENT
Start: 2019-08-15 | End: 2019-08-17 | Stop reason: HOSPADM

## 2019-08-15 RX ORDER — ROPIVACAINE HYDROCHLORIDE 5 MG/ML
INJECTION, SOLUTION EPIDURAL; INFILTRATION; PERINEURAL
Status: COMPLETED | OUTPATIENT
Start: 2019-08-15 | End: 2019-08-15

## 2019-08-15 RX ORDER — SODIUM CHLORIDE, SODIUM LACTATE, POTASSIUM CHLORIDE, CALCIUM CHLORIDE 600; 310; 30; 20 MG/100ML; MG/100ML; MG/100ML; MG/100ML
9 INJECTION, SOLUTION INTRAVENOUS CONTINUOUS
Status: DISCONTINUED | OUTPATIENT
Start: 2019-08-15 | End: 2019-08-17 | Stop reason: HOSPADM

## 2019-08-15 RX ORDER — LIDOCAINE HYDROCHLORIDE 10 MG/ML
0.5 INJECTION, SOLUTION EPIDURAL; INFILTRATION; INTRACAUDAL; PERINEURAL ONCE AS NEEDED
Status: DISCONTINUED | OUTPATIENT
Start: 2019-08-15 | End: 2019-08-15 | Stop reason: HOSPADM

## 2019-08-15 RX ORDER — SODIUM CHLORIDE 450 MG/100ML
100 INJECTION, SOLUTION INTRAVENOUS CONTINUOUS
Status: DISCONTINUED | OUTPATIENT
Start: 2019-08-15 | End: 2019-08-17 | Stop reason: HOSPADM

## 2019-08-15 RX ORDER — ESTRADIOL 1 MG/1
1 TABLET ORAL DAILY
Status: DISCONTINUED | OUTPATIENT
Start: 2019-08-16 | End: 2019-08-17 | Stop reason: HOSPADM

## 2019-08-15 RX ORDER — KETOROLAC TROMETHAMINE 15 MG/ML
15 INJECTION, SOLUTION INTRAMUSCULAR; INTRAVENOUS EVERY 8 HOURS PRN
Status: DISCONTINUED | OUTPATIENT
Start: 2019-08-15 | End: 2019-08-17 | Stop reason: HOSPADM

## 2019-08-15 RX ORDER — NALOXONE HCL 0.4 MG/ML
0.4 VIAL (ML) INJECTION
Status: DISCONTINUED | OUTPATIENT
Start: 2019-08-15 | End: 2019-08-17 | Stop reason: HOSPADM

## 2019-08-15 RX ORDER — ESCITALOPRAM OXALATE 10 MG/1
10 TABLET ORAL DAILY
Status: DISCONTINUED | OUTPATIENT
Start: 2019-08-16 | End: 2019-08-17 | Stop reason: HOSPADM

## 2019-08-15 RX ORDER — CLINDAMYCIN PHOSPHATE 900 MG/50ML
900 INJECTION INTRAVENOUS EVERY 8 HOURS
Status: COMPLETED | OUTPATIENT
Start: 2019-08-15 | End: 2019-08-16

## 2019-08-15 RX ORDER — ONDANSETRON 2 MG/ML
4 INJECTION INTRAMUSCULAR; INTRAVENOUS ONCE AS NEEDED
Status: DISCONTINUED | OUTPATIENT
Start: 2019-08-15 | End: 2019-08-15 | Stop reason: HOSPADM

## 2019-08-15 RX ORDER — HYDROMORPHONE HYDROCHLORIDE 1 MG/ML
0.25 INJECTION, SOLUTION INTRAMUSCULAR; INTRAVENOUS; SUBCUTANEOUS
Status: DISCONTINUED | OUTPATIENT
Start: 2019-08-15 | End: 2019-08-15 | Stop reason: HOSPADM

## 2019-08-15 RX ORDER — MIDAZOLAM HYDROCHLORIDE 1 MG/ML
2 INJECTION INTRAMUSCULAR; INTRAVENOUS
Status: DISCONTINUED | OUTPATIENT
Start: 2019-08-15 | End: 2019-08-15 | Stop reason: HOSPADM

## 2019-08-15 RX ORDER — SODIUM CHLORIDE 0.9 % (FLUSH) 0.9 %
1-10 SYRINGE (ML) INJECTION AS NEEDED
Status: DISCONTINUED | OUTPATIENT
Start: 2019-08-15 | End: 2019-08-15 | Stop reason: HOSPADM

## 2019-08-15 RX ORDER — FENTANYL CITRATE 50 UG/ML
25 INJECTION, SOLUTION INTRAMUSCULAR; INTRAVENOUS
Status: DISCONTINUED | OUTPATIENT
Start: 2019-08-15 | End: 2019-08-15 | Stop reason: HOSPADM

## 2019-08-15 RX ORDER — OXYCODONE HYDROCHLORIDE AND ACETAMINOPHEN 5; 325 MG/1; MG/1
1 TABLET ORAL EVERY 4 HOURS PRN
Status: DISCONTINUED | OUTPATIENT
Start: 2019-08-15 | End: 2019-08-17

## 2019-08-15 RX ORDER — BISACODYL 10 MG
10 SUPPOSITORY, RECTAL RECTAL DAILY PRN
Status: DISCONTINUED | OUTPATIENT
Start: 2019-08-15 | End: 2019-08-17 | Stop reason: HOSPADM

## 2019-08-15 RX ORDER — TRANEXAMIC ACID 100 MG/ML
INJECTION, SOLUTION INTRAVENOUS AS NEEDED
Status: DISCONTINUED | OUTPATIENT
Start: 2019-08-15 | End: 2019-08-15 | Stop reason: SURG

## 2019-08-15 RX ORDER — OXYCODONE HYDROCHLORIDE AND ACETAMINOPHEN 5; 325 MG/1; MG/1
1 TABLET ORAL ONCE AS NEEDED
Status: DISCONTINUED | OUTPATIENT
Start: 2019-08-15 | End: 2019-08-15 | Stop reason: HOSPADM

## 2019-08-15 RX ORDER — CHOLECALCIFEROL (VITAMIN D3) 125 MCG
1000 CAPSULE ORAL DAILY
Status: DISCONTINUED | OUTPATIENT
Start: 2019-08-16 | End: 2019-08-17 | Stop reason: HOSPADM

## 2019-08-15 RX ORDER — FERROUS SULFATE 325(65) MG
325 TABLET ORAL
Status: DISCONTINUED | OUTPATIENT
Start: 2019-08-16 | End: 2019-08-17 | Stop reason: HOSPADM

## 2019-08-15 RX ORDER — AMITRIPTYLINE HYDROCHLORIDE 10 MG/1
10 TABLET, FILM COATED ORAL NIGHTLY PRN
Status: DISCONTINUED | OUTPATIENT
Start: 2019-08-15 | End: 2019-08-17 | Stop reason: HOSPADM

## 2019-08-15 RX ORDER — ASPIRIN 81 MG/1
81 TABLET ORAL 2 TIMES DAILY WITH MEALS
Status: DISCONTINUED | OUTPATIENT
Start: 2019-08-15 | End: 2019-08-17 | Stop reason: HOSPADM

## 2019-08-15 RX ORDER — FENTANYL CITRATE 50 UG/ML
INJECTION, SOLUTION INTRAMUSCULAR; INTRAVENOUS AS NEEDED
Status: DISCONTINUED | OUTPATIENT
Start: 2019-08-15 | End: 2019-08-15 | Stop reason: SURG

## 2019-08-15 RX ORDER — ACETAMINOPHEN 500 MG
1000 TABLET ORAL
Status: COMPLETED | OUTPATIENT
Start: 2019-08-15 | End: 2019-08-15

## 2019-08-15 RX ORDER — ONDANSETRON 4 MG/1
4 TABLET, ORALLY DISINTEGRATING ORAL EVERY 6 HOURS PRN
Status: DISCONTINUED | OUTPATIENT
Start: 2019-08-15 | End: 2019-08-17 | Stop reason: HOSPADM

## 2019-08-15 RX ORDER — DOCUSATE SODIUM 100 MG/1
100 CAPSULE, LIQUID FILLED ORAL 2 TIMES DAILY PRN
Status: DISCONTINUED | OUTPATIENT
Start: 2019-08-15 | End: 2019-08-17 | Stop reason: HOSPADM

## 2019-08-15 RX ORDER — ASCORBIC ACID 500 MG
500 TABLET ORAL DAILY
Status: DISCONTINUED | OUTPATIENT
Start: 2019-08-16 | End: 2019-08-17 | Stop reason: HOSPADM

## 2019-08-15 RX ORDER — MORPHINE SULFATE 2 MG/ML
6 INJECTION, SOLUTION INTRAMUSCULAR; INTRAVENOUS
Status: DISCONTINUED | OUTPATIENT
Start: 2019-08-15 | End: 2019-08-17 | Stop reason: HOSPADM

## 2019-08-15 RX ORDER — BUPIVACAINE HCL/0.9 % NACL/PF 0.1 %
2 PLASTIC BAG, INJECTION (ML) EPIDURAL
Status: DISCONTINUED | OUTPATIENT
Start: 2019-08-15 | End: 2019-08-15

## 2019-08-15 RX ORDER — DIAZEPAM 5 MG/1
5 TABLET ORAL 2 TIMES DAILY PRN
Status: DISCONTINUED | OUTPATIENT
Start: 2019-08-15 | End: 2019-08-17 | Stop reason: HOSPADM

## 2019-08-15 RX ORDER — MAGNESIUM HYDROXIDE 1200 MG/15ML
LIQUID ORAL AS NEEDED
Status: DISCONTINUED | OUTPATIENT
Start: 2019-08-15 | End: 2019-08-15 | Stop reason: HOSPADM

## 2019-08-15 RX ADMIN — MEPIVACAINE HYDROCHLORIDE 10 ML: 15 INJECTION, SOLUTION EPIDURAL; INFILTRATION at 10:53

## 2019-08-15 RX ADMIN — TRANEXAMIC ACID 1000 MG: 100 INJECTION, SOLUTION INTRAVENOUS at 12:49

## 2019-08-15 RX ADMIN — FENTANYL CITRATE 50 MCG: 50 INJECTION INTRAMUSCULAR; INTRAVENOUS at 12:31

## 2019-08-15 RX ADMIN — KETOROLAC TROMETHAMINE 15 MG: 15 INJECTION, SOLUTION INTRAMUSCULAR; INTRAVENOUS at 19:55

## 2019-08-15 RX ADMIN — OXYCODONE HYDROCHLORIDE AND ACETAMINOPHEN 2 TABLET: 5; 325 TABLET ORAL at 17:50

## 2019-08-15 RX ADMIN — SODIUM CHLORIDE, POTASSIUM CHLORIDE, SODIUM LACTATE AND CALCIUM CHLORIDE 9 ML/HR: 600; 310; 30; 20 INJECTION, SOLUTION INTRAVENOUS at 11:03

## 2019-08-15 RX ADMIN — MUPIROCIN 1 APPLICATION: 20 OINTMENT TOPICAL at 21:48

## 2019-08-15 RX ADMIN — HYDROMORPHONE HYDROCHLORIDE 0.25 MG: 1 INJECTION, SOLUTION INTRAMUSCULAR; INTRAVENOUS; SUBCUTANEOUS at 14:30

## 2019-08-15 RX ADMIN — CLINDAMYCIN PHOSPHATE 900 MG: 900 INJECTION, SOLUTION INTRAVENOUS at 21:47

## 2019-08-15 RX ADMIN — ONDANSETRON 4 MG: 4 TABLET, ORALLY DISINTEGRATING ORAL at 16:29

## 2019-08-15 RX ADMIN — ACETAMINOPHEN 1000 MG: 500 TABLET, FILM COATED ORAL at 10:24

## 2019-08-15 RX ADMIN — PROPOFOL 150 MG: 10 INJECTION, EMULSION INTRAVENOUS at 12:31

## 2019-08-15 RX ADMIN — LIDOCAINE HYDROCHLORIDE 50 MG: 20 INJECTION, SOLUTION INFILTRATION; PERINEURAL at 12:31

## 2019-08-15 RX ADMIN — FENTANYL CITRATE 25 MCG: 50 INJECTION, SOLUTION INTRAMUSCULAR; INTRAVENOUS at 14:24

## 2019-08-15 RX ADMIN — FENTANYL CITRATE 25 MCG: 50 INJECTION, SOLUTION INTRAMUSCULAR; INTRAVENOUS at 14:34

## 2019-08-15 RX ADMIN — ATORVASTATIN CALCIUM 10 MG: 10 TABLET, FILM COATED ORAL at 21:48

## 2019-08-15 RX ADMIN — ROPIVACAINE HYDROCHLORIDE 30 ML: 5 INJECTION, SOLUTION EPIDURAL; INFILTRATION; PERINEURAL at 10:53

## 2019-08-15 RX ADMIN — MIDAZOLAM 2 MG: 1 INJECTION INTRAMUSCULAR; INTRAVENOUS at 11:03

## 2019-08-15 RX ADMIN — ASPIRIN 81 MG: 81 TABLET, COATED ORAL at 17:50

## 2019-08-15 RX ADMIN — OXYCODONE HYDROCHLORIDE AND ACETAMINOPHEN 1 TABLET: 5; 325 TABLET ORAL at 21:54

## 2019-08-15 RX ADMIN — SODIUM CHLORIDE, POTASSIUM CHLORIDE, SODIUM LACTATE AND CALCIUM CHLORIDE: 600; 310; 30; 20 INJECTION, SOLUTION INTRAVENOUS at 13:18

## 2019-08-15 RX ADMIN — CEFAZOLIN SODIUM 2 G: 2 INJECTION, SOLUTION INTRAVENOUS at 13:20

## 2019-08-15 RX ADMIN — FENTANYL CITRATE 25 MCG: 50 INJECTION, SOLUTION INTRAMUSCULAR; INTRAVENOUS at 14:10

## 2019-08-15 RX ADMIN — FAMOTIDINE 20 MG: 10 INJECTION INTRAVENOUS at 11:03

## 2019-08-15 RX ADMIN — HYDROMORPHONE HYDROCHLORIDE 0.25 MG: 1 INJECTION, SOLUTION INTRAMUSCULAR; INTRAVENOUS; SUBCUTANEOUS at 14:14

## 2019-08-15 NOTE — ANESTHESIA PROCEDURE NOTES
Airway  Airway not difficult    General Information and Staff    Patient location during procedure: OR  Anesthesiologist: James Johnson MD    Indications and Patient Condition  Indications for airway management: airway protection    Preoxygenated: yes  MILS maintained throughout  Mask difficulty assessment: 1 - vent by mask    Final Airway Details  Final airway type: supraglottic airway      Successful airway: classic  Size 4    Number of attempts at approach: 1

## 2019-08-15 NOTE — ANESTHESIA PROCEDURE NOTES
Peripheral Block    Pre-sedation assessment completed: 8/15/2019 10:53 AM    Patient reassessed immediately prior to procedure    Patient location during procedure: pre-op  Start time: 8/15/2019 10:53 AM  Stop time: 8/15/2019 11:03 AM  Reason for block: at surgeon's request and post-op pain management  Performed by  Anesthesiologist: Danilo Carl MD  Preanesthetic Checklist  Completed: patient identified, site marked, surgical consent, pre-op evaluation, timeout performed, IV checked, risks and benefits discussed and monitors and equipment checked  Prep:  Sterile barriers:cap, gloves, gown, mask and sterile barriers  Prep: ChloraPrep  Patient monitoring: blood pressure monitoring, continuous pulse oximetry and EKG  Procedure  Sedation:yes    Guidance:ultrasound guided  ULTRASOUND INTERPRETATION.  Using ultrasound guidance a 21 G gauge needle was placed in close proximity to the femoral nerve, at which point, under ultrasound guidance anesthetic was injected in the area of the nerve and spread of the anesthesia was seen on ultrasound in close proximity thereto.  There were no abnormalities seen on ultrasound; a digital image was taken; and the patient tolerated the procedure with no complications. Images:still images obtained    Laterality:left  Block Type:adductor canal block  Injection Technique:single-shot  Needle Type:echogenic  Needle Gauge:21 G      Medications Used: mepivacaine (CARBOCAINE) 1.5 % injection, 10 mL  ropivacaine (NAROPIN) 0.5 % injection, 30 mL      Post Assessment  Injection Assessment: negative aspiration for heme, no paresthesia on injection and incremental injection  Patient Tolerance:comfortable throughout block  Complications:no

## 2019-08-15 NOTE — OP NOTE
Orthopaedic Surgery Operative Note    Patient Name:  Denisse Flores  YOB: 1960  Medical Records Number:  [id number]    Date of Procedure:  8/15/2019    Pre-operative Diagnosis:  Arthrofibrosis Left Total Knee    Post-operative Diagnosis:  Arthrofibrosis Left Total Knee    Procedure Performed:  Left Total Knee Arthroplasty Revision/poly change    Implants:  Biomet Vanguard TKA, 62.5 Femoral Component, 67 Tibial Tray with a 40 mm Modular Stem, 31 3-Peg Patella, 10 std  Polyethylene Insert    Surgeon:  Pedro Priest M.D.    Assistant: Conchita Lopez (who was present during the critical portions of the case, thereby decreasing operative time and patient morbidity)    Anesthetic Type:  General    Estimated Blood Loss:  250cc's    Specimens:   Order Name Source Comment Collection Info Order Time   WOUND CULTURE Knee, Left  Collected By: Pedro Priest MD 8/15/2019  1:07 PM       No Complications      Indications for Procedure:  Denisse Flores is a 59 y.o. female suffering from end stage degenerative changes in the left knee.  The patients pain is becoming disabling, despite extensive conservative care, including NSAIDS, therapy and injections. The risks, benefits and alternatives were discussed and the patient wishes to proceed with left total knee arthroplasty.      Procedure Performed:    After informed consent was obtained and pre-operative IV Kefzol  given, prior to tourniquet inflation, the patient was taken to the operating room and placed supine on the operating table.  After general anesthesia induced and 1 gm of Tranxemic Acid given,  the patient's left lower extremity was prepped with chloraprep and draped in a sterile fashion.    A midline incision was made overlying the left knee through the previous incision and we sharply dissected down to expose the parapatellar retinaculum.  A mid-vastus, muscle sparing, parapatellar arthrotomy was performed and we elevated the soft tissue both  "medially and laterally.  There were significant adhesions throughout the knee and the scar tissue was removed by performing a thorough synovectomy.  A lateral release was performed to improve patellar tracking and release tension on the extensor mechanism.  We inspected the implant/cement/bone interfaces of the femoral, tibial and patellar components and there was no sign of implant loosening.  We removed the old 10 AS poly and thoroughly irrigated the knee. We trialed with the 10 and 12 standard and posterior lipped polyethylene liners.  The 10 std gave us the best range of motion, stability and ligament balance, throughout a complete arc of motion.  We removed the trials, copiously irrigated the knee, gave IV antibiotics and local anesthetic, then placed our permanent polyethylene liner.  We were able to obtain full extension and flexion to 130 degrees.  We placed a 1/8\" hemovac drain, then closed the arthrotomy with #1 Vicryl pop-off sutures.  The subcutaneous tissue was closed with 2-0 vicryl pop-off sutures.  The skin was closed with staples.  We placed a sterile dressing of Xeroform, 4x4's, abdominal pads, cast padding and an Ace Wrap.  The patient was then awakened from general anesthesia and taken to the recovery room in stable condition.    The patient will be started on Aspirin 81 mg twice daily for DVT prophylaxis.  IV antibiotics will be discontinued within 24 hours of surgery.  Immediately prior to surgery, there were no acute Thromboembolic nor Cardiovascular risk factors.  An updated Medical Reconciliation form is on the chart.    Pedro Priest MD, 8/15/2019        "

## 2019-08-15 NOTE — ANESTHESIA PREPROCEDURE EVALUATION
Anesthesia Evaluation     Patient summary reviewed and Nursing notes reviewed                Airway   Mallampati: III  Dental      Pulmonary    (+) shortness of breath,   Cardiovascular     ECG reviewed  Rhythm: regular  Rate: normal    (+) hyperlipidemia,       Neuro/Psych  (+) headaches, psychiatric history,     GI/Hepatic/Renal/Endo - negative ROS     Musculoskeletal     Abdominal    Substance History - negative use     OB/GYN negative ob/gyn ROS         Other   (+) arthritis                     Anesthesia Plan    ASA 2     general with block   (Left ACBx)  intravenous induction   Anesthetic plan, all risks, benefits, and alternatives have been provided, discussed and informed consent has been obtained with: patient.

## 2019-08-15 NOTE — PLAN OF CARE
Problem: Patient Care Overview  Goal: Plan of Care Review  Outcome: Ongoing (interventions implemented as appropriate)   08/15/19 1654   Coping/Psychosocial   Plan of Care Reviewed With patient   OTHER   Outcome Summary A&OX4, WORKED C PTX1, AMBULATED, UP TO CHAIR, PAIN CONTROLLED, NAUSEA, IVF, IV ABX, DSG CDI, NVI, VSS, DTV   Plan of Care Review   Progress improving     Goal: Individualization and Mutuality  Outcome: Ongoing (interventions implemented as appropriate)    Goal: Discharge Needs Assessment  Outcome: Ongoing (interventions implemented as appropriate)    Goal: Interprofessional Rounds/Family Conf  Outcome: Ongoing (interventions implemented as appropriate)      Problem: Knee Arthroplasty (Total, Partial) (Adult)  Goal: Signs and Symptoms of Listed Potential Problems Will be Absent, Minimized or Managed (Knee Arthroplasty)  Outcome: Ongoing (interventions implemented as appropriate)    Goal: Anesthesia/Sedation Recovery  Outcome: Ongoing (interventions implemented as appropriate)      Problem: Fall Risk (Adult)  Goal: Identify Related Risk Factors and Signs and Symptoms  Outcome: Outcome(s) achieved Date Met: 08/15/19   08/15/19 1654   Fall Risk (Adult)   Related Risk Factors (Fall Risk) environment unfamiliar     Goal: Absence of Fall  Outcome: Ongoing (interventions implemented as appropriate)

## 2019-08-15 NOTE — H&P
"  Orthopaedic Surgery History and Physical    Patient Name:  Denisse Flores  YOB: 1960    Medical Records Number:  [id number]    Date of Admission:  8/15/2019  9:01 AM    Chief Complaint:  OA (osteoarthritis) of knee [M17.10]    Denisse Flores is a 59 y.o. female who presents c/o severe left knee pain.  The pain has been on and off for many years, worsening to the point where the pain is becoming disabling.  The pain is a constant dull ache with occasional sharp, stabbing pain.  She is suffering from significant stiffness despite her best efforts in PT.  The patient has failed conservative treatment and would like to proceed with left total knee arthroplasty revision with poly change and removal of scar tissue.    /73 (BP Location: Right arm, Patient Position: Lying)   Pulse 95   Temp 98 °F (36.7 °C) (Oral)   Resp 18   Ht 165.1 cm (65\")   Wt 70.5 kg (155 lb 6.8 oz)   SpO2 98%   BMI 25.86 kg/m²     Past Medical History:    Past Medical History:   Diagnosis Date   • Anxiety    • Arthritis    • Environmental allergies    • High cholesterol    • History of panic attacks     POST OP W/ LAST KNEE SURGERY   • History of skin cancer 1970'S   • History of transfusion    • Insomnia    • Knee pain    • Migraine    • Vitamin D deficiency        Social History:    Social History     Socioeconomic History   • Marital status:      Spouse name: Not on file   • Number of children: Not on file   • Years of education: Not on file   • Highest education level: Not on file   Tobacco Use   • Smoking status: Never Smoker   • Smokeless tobacco: Never Used   Substance and Sexual Activity   • Alcohol use: Yes     Comment: RARELY   • Drug use: No   • Sexual activity: Defer   Social History Narrative           Family History:    Family History   Problem Relation Age of Onset   • Malig Hyperthermia Neg Hx        Current Medications:    Current Facility-Administered Medications:   •  ceFAZolin in " dextrose (ANCEF) IVPB solution 2 g, 2 g, Intravenous, Once, Pedro Priest MD  •  famotidine (PEPCID) injection 20 mg, 20 mg, Intravenous, Once, Danilo Carl MD  •  fentaNYL citrate (PF) (SUBLIMAZE) injection 50 mcg, 50 mcg, Intravenous, Q10 Min PRN, Danilo Carl MD  •  lactated ringers infusion, 9 mL/hr, Intravenous, Continuous, Danilo Carl MD  •  lidocaine PF 1% (XYLOCAINE) injection 0.5 mL, 0.5 mL, Injection, Once PRN, Danilo Carl MD  •  midazolam (VERSED) injection 1 mg, 1 mg, Intravenous, Q5 Min PRN **OR** midazolam (VERSED) injection 2 mg, 2 mg, Intravenous, Q5 Min PRN, Danilo Carl MD  •  ropivacaine (NAROPIN) 0.5 % 50 mL, Morphine (PF) 5 mg, EPINEPHrine (ADRENALIN) 0.3 mg in sodium chloride 0.9 % 100.8 mL, , Injection, Once, Pedro Priest MD  •  sodium chloride 0.9 % flush 1-10 mL, 1-10 mL, Intravenous, PRN, Danilo Carl MD    Allergies:    Allergies   Allergen Reactions   • Celecoxib Swelling     FACIAL SWELLING       Review of Systems:   HEENT: Patient denies any headaches, vision changes, change in hearing, or tinnitus, Patient denies any rhinorrhea,epistaxis, sinus pain, mouth or dental problems, sore throat or hoarseness, or dysphagia  Pulmonary: Patient denies any cough, congestion, SOA, or wheezing  Cardiovascular: Patient denies any chest pain, dyspnea, palpitations, weakness, intolerance of exercise, varicosities, swelling of extremities, known murmur  Gastrointestinal:  Patient denies nausea, vomiting, diarrhea, constipation, loss  of appetite, change in appetite, dysphagia, gas, heartburn, melena, change in bowel habits, use of laxatives or other drugs to alter the function of the gastrointestinal tract.  Genital/Urinary: Patient denies dysuria, change in color of urine, change in frequency of urination, pain with urgency, incontinence, retention, or nocturia.  Musculoskeletal: Patient denies increased warmth; redness; or  swelling of joints; limitation of function; deformity; crepitation: pain in a joint or an extremity, the neck, or the back, especially with movement.  Neurological: Patient denies dizziness, tremor, ataxia, difficulty in speaking, change in speech, paresthesia, loss of sensation, seizures, syncope, changes in memory.  Endocrine system: Patient denies tremors, palpitations, intolerance of heat or cold, polyuria, polydipsia, polyphagia, diaphoresis, exophthalmos, or goiter.  Psychological: Patient denies thoughts/plans or harming self or other; depression,  insomnia, night terrors, pasha, memory loss, disorientation.  Skin: Patient denies any bruising, rashes, discoloration, pruritus, wounds, ulcers, decubiti, changes in the hair or nails  Hematopoietic: Patient denies history of spontaneous or excessive bleeding, epistaxis, hematuria, melena, fatigue, enlarged or tender lymph nodes, pallor, history of anemia.        Physical Exam:   Awake, A&O x3, affect normal, no acute distress  Ambulating with a limp due to knee pain  Knee ROM is limited due to pain (5-105)  Strength is 4/5 in the quad, hamstring and calf  Cap refill is normal, Sensation intact    Card:  RR, HD Stable  Pulm:  Regular breathing, no S.O.A  Abd:  Soft, NT, ND    Lab Results (last 24 hours)     ** No results found for the last 24 hours. **          Xr Knee 1 Or 2 View Left    Result Date: 8/2/2019  Narrative: XR KNEE 1 OR 2 VW LEFT-  INDICATIONS: Preoperative evaluation  TECHNIQUE: FRONTAL AND LATERAL VIEWS OF THE LEFT KNEE, STANDING  COMPARISON: 08/22/2017  FINDINGS:  Left knee arthroplasty hardware is noted. No acute fracture or erosion is identified. Trace knee effusion.      Impression:  No acute fracture is identified.  This report was finalized on 8/2/2019 3:05 PM by Dr. Jason Jurado M.D.      Xr Chest Pa & Lateral    Result Date: 8/2/2019  Narrative: XR CHEST PA AND LATERAL-  HISTORY: Female who is 59 years-old,  preoperative evaluation   TECHNIQUE: Frontal and lateral views of the chest  COMPARISON: 08/15/2017  FINDINGS: Heart, mediastinum and pulmonary vasculature are unremarkable. No focal pulmonary consolidation, pleural effusion, or pneumothorax. No acute osseous process.      Impression: No evidence for acute pulmonary process. Follow-up as clinically indicated.  This report was finalized on 8/2/2019 3:03 PM by Dr. Jason Jurado M.D.          Assessment:  Arthrofibrosis Left Total Knee Arthroplasty    Plan:  Patient's pain is becoming disabling, despite extensive conservative treatment.  Radiographs reveal excellent implant position and alignment.  The risks of surgery, including, but not limited to, heart attack, stroke, dying, DVT, nerve injury, vascular injury, arthrofibrosis and infection were discussed.  The alternatives and benefits were also discussed.  All questions answered and the patient wishes to proceed with left total knee arthroplasty revision with poly change and removal of scar tissue.    Pedro Priest MD  8/15/2019    CC: Johanna Anglin APRN, Pedro Priest MD

## 2019-08-15 NOTE — ANESTHESIA POSTPROCEDURE EVALUATION
Patient: Denisse Flores    Procedure Summary     Date:  08/15/19 Room / Location:  [facility] OR [address] [place] [address] MAIN OR    Anesthesia Start:  1225 Anesthesia Stop:  1348    Procedure:  LEFT TOTAL KNEE ARTHROPLASTY REVISION POLY CHANGE (Left Knee) Diagnosis:      Surgeon:  Pedro Priest MD Provider:  James Johnson MD    Anesthesia Type:  general with block ASA Status:  2          Anesthesia Type: general with block  Last vitals  BP   124/74 (08/15/19 1428)   Temp   36.9 °C (98.4 °F) (08/15/19 1342)   Pulse   92 (08/15/19 1428)   Resp   14 (08/15/19 1342)     SpO2   97 % (08/15/19 1428)     Post Anesthesia Care and Evaluation    Patient location during evaluation: PACU  Anesthetic complications: No anesthetic complications

## 2019-08-15 NOTE — PLAN OF CARE
Problem: Patient Care Overview  Goal: Plan of Care Review  Outcome: Ongoing (interventions implemented as appropriate)   08/15/19 9752   Coping/Psychosocial   Plan of Care Reviewed With patient   OTHER   Outcome Summary Pt is post op L TKA revision on 8/15. Pt presents with decreased L LE strength and L knee ROM post op. Pt complains of nausea and dizziness during gait limiting distance. Pt demonstrates mild difficulty walking secondary to pain. Pt is a good candidate for skilled therapy to address ROM, strength, and gait to improve functional independence.

## 2019-08-15 NOTE — THERAPY EVALUATION
Patient Name: Denisse Flores  : 1960    MRN: [id number]                              Today's Date: 8/15/2019       Admit Date: 8/15/2019    Visit Dx:     ICD-10-CM ICD-9-CM   1. Pain due to any device, implant or graft T85.848A 996.70     Patient Active Problem List   Diagnosis   • OA (osteoarthritis) of knee   • Anxiety   • Dyspnea   • Leukocytosis   • Insomnia     Past Medical History:   Diagnosis Date   • Anxiety    • Arthritis    • Environmental allergies    • High cholesterol    • History of panic attacks     POST OP W/ LAST KNEE SURGERY   • History of skin cancer    • History of transfusion    • Insomnia    • Knee pain    • Migraine    • Vitamin D deficiency      Past Surgical History:   Procedure Laterality Date   •  SECTION      X 3   • CHOLECYSTECTOMY     • HYSTERECTOMY     • MA TOTAL KNEE ARTHROPLASTY Left 2017    Procedure: LT TOTAL KNEE ARTHROPLASTY;  Surgeon: Pedro Priest MD;  Location: [facility];  Service: Orthopedics     General Information     Row Name 08/15/19 1631          PT Evaluation Time/Intention    Document Type  evaluation  (Pended)   -BK     Mode of Treatment  physical therapy  (Pended)   -BK     Row Name 08/15/19 1631          General Information    Prior Level of Function  independent:  (Pended)   -BK     Existing Precautions/Restrictions  fall  (Pended)   -BK     Barriers to Rehab  none identified  (Pended)   -BK     Row Name 08/15/19 1631 08/15/19 1003       Relationship/Environment    Primary Source of Support/Comfort  --  spouse  -PL    Name of Support/Comfort Primary Source  --  Emilio  -PL    Lives With  spouse  (Pended)   -BK  spouse  -PL    Family Caregiver if Needed  --  spouse  -PL    Primary Roles/Responsibilities  --  homemaker;retired  -PL    Row Name 08/15/19 1631 08/15/19 1530       Resource/Environmental Concerns    Current Living Arrangements  home/apartment/condo  (Pended)   -BK  home/apartment/condo  -LP    Resource/Environmental  Concerns  --  none  -LP    Row Name 08/15/19 1003          Resource/Environmental Concerns    Current Living Arrangements  home/apartment/condo  -PL     Resource/Environmental Concerns  none  -PL     Row Name 08/15/19 1631          Home Main Entrance    Number of Stairs, Main Entrance  none  (Pended)   -BK     Row Name 08/15/19 1631          Stairs Within Home, Primary    Number of Stairs, Within Home, Primary  none  (Pended)   -BK     Row Name 08/15/19 1631          Cognitive Assessment/Intervention- PT/OT    Orientation Status (Cognition)  oriented x 3  (Pended)   -BK     Row Name 08/15/19 1631          Safety Issues, Functional Mobility    Impairments Affecting Function (Mobility)  pain;range of motion (ROM);strength  (Pended)   -RIANNA       User Key  (r) = Recorded By, (t) = Taken By, (c) = Cosigned By    Initials Name Provider Type    PL Stacy Bhardwaj RN Registered Nurse    Dalia Guerra RN Registered Nurse    Isaías Shelton, PT Student PT Student        Mobility     Row Name 08/15/19 1632          Bed Mobility Assessment/Treatment    Bed Mobility Assessment/Treatment  supine-sit;sit-supine  (Pended)   -RIANNA     Supine-Sit [place] (Bed Mobility)  supervision  (Pended)   -BK     Sit-Supine [place] (Bed Mobility)  not tested  (Pended)  sitting in chair  -     Row Name 08/15/19 1632          Sit-Stand Transfer    Sit-Stand [place] (Transfers)  contact guard  (Pended)   -BK     Assistive Device (Sit-Stand Transfers)  walker, front-wheeled  (Pended)   -BK     Row Name 08/15/19 1632          Gait/Stairs Assessment/Training    [place] Level (Gait)  contact guard  (Pended)   -BK     Assistive Device (Gait)  walker, front-wheeled  (Pended)   -BK     Distance in Feet (Gait)  60  (Pended)   -BK     Deviations/Abnormal Patterns (Gait)  antalgic;tre decreased  (Pended)   -BK     Bilateral Gait Deviations  heel strike decreased  (Pended)   -BK     Comment (Gait/Stairs)  cueing to not step  so close to walker  (Pended)   -BK       User Key  (r) = Recorded By, (t) = Taken By, (c) = Cosigned By    Initials Name Provider Type    Isaías Shelton, PT Student PT Student        Obj/Interventions     Row Name 08/15/19 1632          General ROM    GENERAL ROM COMMENTS  L knee ROM decreased post op  (Pended)   -BK     Row Name 08/15/19 1632          MMT (Manual Muscle Testing)    General MMT Comments  L LE strength decreased post op  (Pended)   -BK     Row Name 08/15/19 1632          Therapeutic Exercise    Comment (Therapeutic Exercise)  10 reps L TKA protocol   (Pended)   -BK     Row Name 08/15/19 1632          Static Standing Balance    Level of [place] (Supported Standing, Static Balance)  contact guard assist  (Pended)   -BK     Assistive Device Utilized (Supported Standing, Static Balance)  walker, rolling  (Pended)   -BK     Row Name 08/15/19 1632          Dynamic Standing Balance    Level of [place], Reaches Outside Midline (Standing, Dynamic Balance)  contact guard assist  (Pended)   -BK     Assistive Device Utilized (Supported Standing, Dynamic Balance)  walker, rolling  (Pended)   -BK       User Key  (r) = Recorded By, (t) = Taken By, (c) = Cosigned By    Initials Name Provider Type    Isaías Shelton, PT Student PT Student        Goals/Plan     Row Name 08/15/19 1632          Transfer Goal 1 (PT)    Activity/Assistive Device (Transfer Goal 1, PT)  transfers, all;walker, rolling  (Pended)   -BK     [place] Level/Cues Needed (Transfer Goal 1, PT)  conditional independence  (Pended)   -BK     Time Frame (Transfer Goal 1, PT)  3 days  (Pended)   -BK     Row Name 08/15/19 1632          Gait Training Goal 1 (PT)    Activity/Assistive Device (Gait Training Goal 1, PT)  gait (walking locomotion);walker, rolling  (Pended)   -BK     [place] Level (Gait Training Goal 1, PT)  conditional independence  (Pended)   -BK     Distance (Gait Goal 1, PT)  150  (Pended)   -BK     Time  Frame (Gait Training Goal 1, PT)  3 days  (Pended)   -BK     Row Name 08/15/19 1632          ROM Goal 1 (PT)    ROM Goal 1 (PT)  0-90 L knee ROM   (Pended)   -BK     Time Frame (ROM Goal 1, PT)  3 days  (Pended)   -BK       User Key  (r) = Recorded By, (t) = Taken By, (c) = Cosigned By    Initials Name Provider Type    Isaías Shelton, PT Student PT Student        Clinical Impression     Row Name 08/15/19 1632          Pain Assessment    Additional Documentation  Pain Scale: Numbers Pre/Post-Treatment (Group)  (Pended)   -BK     Row Name 08/15/19 1632          Pain Scale: Numbers Pre/Post-Treatment    Pain Scale: Numbers, Post-Treatment  6/10  (Pended)   -BK     Pain Location - Side  Left  (Pended)   -BK     Pain Location  knee  (Pended)   -BK     Pain Intervention(s)  Cold applied;Repositioned  (Pended)   -BK     Row Name 08/15/19 1637 08/15/19 1438       Plan of Care Review    Plan of Care Reviewed With  patient  (Pended)   -BK  patient  -BR    Row Name 08/15/19 1005          Plan of Care Review    Plan of Care Reviewed With  spouse;patient  -PL     [facility] Name 08/15/19 1632          Physical Therapy Clinical Impression    Patient/Family Goals Statement (PT Clinical Impression)  to return to PLOF  (Pended)   -BK     Criteria for Skilled Interventions Met (PT Clinical Impression)  treatment indicated  (Pended)   -BK     Rehab Potential (PT Clinical Summary)  good, to achieve stated therapy goals  (Pended)   -BK     Row Name 08/15/19 1632          Positioning and Restraints    Pre-Treatment Position  in bed  (Pended)   -BK     Post Treatment Position  chair  (Pended)   -BK     In Chair  reclined;call light within reach;encouraged to call for assist;exit alarm on;with family/caregiver  (Pended)   -BK       User Key  (r) = Recorded By, (t) = Taken By, (c) = Cosigned By    Initials Name Provider Type    Stacy Rhoades RN Registered Nurse    Fidelia Yu, RN Registered Nurse    RIANNA Calderón  Isaías, PT Student PT Student        Outcome Measures     Row Name 08/15/19 1632          How much help from another person do you currently need...    Turning from your back to your side while in flat bed without using bedrails?  3  (Pended)   -BK     Moving from lying on back to sitting on the side of a flat bed without bedrails?  3  (Pended)   -BK     Moving to and from a bed to a chair (including a wheelchair)?  3  (Pended)   -BK     Standing up from a chair using your arms (e.g., wheelchair, bedside chair)?  3  (Pended)   -BK     Climbing 3-5 steps with a railing?  3  (Pended)   -BK     To walk in hospital room?  3  (Pended)   -BK     AM-PAC 6 Clicks Score (PT)  18  (Pended)   -BK     Row Name 08/15/19 1632          Functional Assessment    Outcome Measure Options  AM-PAC 6 Clicks Basic Mobility (PT)  (Pended)   -       User Key  (r) = Recorded By, (t) = Taken By, (c) = Cosigned By    Initials Name Provider Type    Isaías Shelton, PT Student PT Student        Physical Therapy Education     Title: PT OT SLP Therapies (Done)     Topic: Physical Therapy (Done)     Point: Mobility training (Done)     Learning Progress Summary           Patient Acceptance, E,TB,D, VU,NR by RIANNA at 8/15/2019  4:37 PM                   Point: Home exercise program (Done)     Learning Progress Summary           Patient Acceptance, E,TB,D, VU,NR by RIANNA at 8/15/2019  4:37 PM                   Point: Body mechanics (Done)     Learning Progress Summary           Patient Acceptance, E,TB,D, VU,NR by RIANNA at 8/15/2019  4:37 PM                   Point: Precautions (Done)     Learning Progress Summary           Patient Acceptance, E,TB,D, VU,NR by RIANNA at 8/15/2019  4:37 PM                               User Key     Initials Effective Dates Name Provider Type [facility] 07/05/19 -  Isaías Calderón, PT Student PT Student PT              PT Recommendation and Plan  Planned Therapy Interventions (PT Eval): (P) balance  training, bed mobility training, gait training, home exercise program, patient/family education, transfer training, stair training, ROM (range of motion)  Outcome Summary/Treatment Plan (PT)  Anticipated Discharge Disposition (PT): (P) home with home health, home with OP services  Plan of Care Reviewed With: (P) patient  Outcome Summary: (P) Pt is post op L TKA revision on 8/15. Pt presents with decreased L LE strength and L knee ROM post op. Pt complains of nausea and dizziness during gait limiting distance. Pt demonstrates mild difficulty walking secondary to pain. Pt is a good candidate for skilled therapy to address ROM, strength, and gait to improve functional independence.      Time Calculation:   PT Charges     Row Name 08/15/19 1639             Time Calculation    Start Time  1607  (Pended)   -BK      Stop Time  1631  (Pended)   -BK      Time Calculation (min)  24 min  (Pended)   -BK      PT Received On  08/15/19  (Pended)   -BK      PT - Next Appointment  08/16/19  (Pended)   -BK      PT Goal Re-Cert Due Date  08/18/19  (Pended)   -BK         Time Calculation- PT    Total Timed Code Minutes- PT  20 minute(s)  (Pended)   -BK        User Key  (r) = Recorded By, (t) = Taken By, (c) = Cosigned By    Initials Name Provider Type    Isaías Shelton, PT Student PT Student        Therapy Charges for Today     Code Description Service Date Service Provider Modifiers Qty    [id number]  PT EVAL MOD COMPLEXITY 2 8/15/2019 Isaías Calderón, PT Student GP 1    [id number]  PT THER PROC EA 15 MIN 8/15/2019 Isaías Calderón, PT Student GP 1          PT G-Codes  Outcome Measure Options: (P) AM-PAC 6 Clicks Basic Mobility (PT)  AM-PAC 6 Clicks Score (PT): (P) 18    Kaitlynn Ya PT Student  8/15/2019

## 2019-08-16 LAB
ANION GAP SERPL CALCULATED.3IONS-SCNC: 8.7 MMOL/L (ref 5–15)
BUN BLD-MCNC: 10 MG/DL (ref 6–20)
BUN/CREAT SERPL: 13.7 (ref 7–25)
CALCIUM SPEC-SCNC: 8.9 MG/DL (ref 8.6–10.5)
CHLORIDE SERPL-SCNC: 99 MMOL/L (ref 98–107)
CO2 SERPL-SCNC: 28.3 MMOL/L (ref 22–29)
CREAT BLD-MCNC: 0.73 MG/DL (ref 0.57–1)
DEPRECATED RDW RBC AUTO: 46.4 FL (ref 37–54)
ERYTHROCYTE [DISTWIDTH] IN BLOOD BY AUTOMATED COUNT: 12.7 % (ref 12.3–15.4)
GFR SERPL CREATININE-BSD FRML MDRD: 82 ML/MIN/1.73
GLUCOSE BLD-MCNC: 99 MG/DL (ref 65–99)
HCT VFR BLD AUTO: 37.8 % (ref 34–46.6)
HGB BLD-MCNC: 12.1 G/DL (ref 12–15.9)
MCH RBC QN AUTO: 31.7 PG (ref 26.6–33)
MCHC RBC AUTO-ENTMCNC: 32 G/DL (ref 31.5–35.7)
MCV RBC AUTO: 99 FL (ref 79–97)
PLATELET # BLD AUTO: 268 10*3/MM3 (ref 140–450)
PMV BLD AUTO: 10.7 FL (ref 6–12)
POTASSIUM BLD-SCNC: 4.2 MMOL/L (ref 3.5–5.2)
RBC # BLD AUTO: 3.82 10*6/MM3 (ref 3.77–5.28)
SODIUM BLD-SCNC: 136 MMOL/L (ref 136–145)
WBC NRBC COR # BLD: 10.68 10*3/MM3 (ref 3.4–10.8)

## 2019-08-16 PROCEDURE — 97150 GROUP THERAPEUTIC PROCEDURES: CPT

## 2019-08-16 PROCEDURE — 25010000002 KETOROLAC TROMETHAMINE PER 15 MG: Performed by: ORTHOPAEDIC SURGERY

## 2019-08-16 PROCEDURE — 25010000002 MORPHINE PER 10 MG: Performed by: ORTHOPAEDIC SURGERY

## 2019-08-16 PROCEDURE — 97110 THERAPEUTIC EXERCISES: CPT

## 2019-08-16 PROCEDURE — 85027 COMPLETE CBC AUTOMATED: CPT | Performed by: ORTHOPAEDIC SURGERY

## 2019-08-16 PROCEDURE — 80048 BASIC METABOLIC PNL TOTAL CA: CPT | Performed by: ORTHOPAEDIC SURGERY

## 2019-08-16 RX ORDER — ASPIRIN 81 MG/1
81 TABLET ORAL 2 TIMES DAILY WITH MEALS
Qty: 60 TABLET | Refills: 0 | Status: SHIPPED | OUTPATIENT
Start: 2019-08-16 | End: 2020-09-30

## 2019-08-16 RX ORDER — PSEUDOEPHEDRINE HCL 30 MG
100 TABLET ORAL 2 TIMES DAILY PRN
Qty: 30 EACH | Refills: 1 | Status: SHIPPED | OUTPATIENT
Start: 2019-08-16 | End: 2019-10-18

## 2019-08-16 RX ORDER — OXYCODONE HYDROCHLORIDE AND ACETAMINOPHEN 5; 325 MG/1; MG/1
1-2 TABLET ORAL EVERY 4 HOURS PRN
Qty: 84 TABLET | Refills: 0 | Status: SHIPPED | OUTPATIENT
Start: 2019-08-16 | End: 2019-08-17 | Stop reason: HOSPADM

## 2019-08-16 RX ADMIN — Medication 1000 MCG: at 08:23

## 2019-08-16 RX ADMIN — OXYCODONE HYDROCHLORIDE AND ACETAMINOPHEN 2 TABLET: 5; 325 TABLET ORAL at 11:02

## 2019-08-16 RX ADMIN — SODIUM CHLORIDE, PRESERVATIVE FREE 3 ML: 5 INJECTION INTRAVENOUS at 08:24

## 2019-08-16 RX ADMIN — OXYCODONE HYDROCHLORIDE AND ACETAMINOPHEN 1 TABLET: 5; 325 TABLET ORAL at 02:03

## 2019-08-16 RX ADMIN — CLINDAMYCIN PHOSPHATE 900 MG: 900 INJECTION, SOLUTION INTRAVENOUS at 05:29

## 2019-08-16 RX ADMIN — ASPIRIN 81 MG: 81 TABLET, COATED ORAL at 18:00

## 2019-08-16 RX ADMIN — MAGNESIUM HYDROXIDE 10 ML: 2400 SUSPENSION ORAL at 18:35

## 2019-08-16 RX ADMIN — OXYCODONE HYDROCHLORIDE AND ACETAMINOPHEN 2 TABLET: 5; 325 TABLET ORAL at 22:14

## 2019-08-16 RX ADMIN — FERROUS SULFATE TAB 325 MG (65 MG ELEMENTAL FE) 325 MG: 325 (65 FE) TAB at 08:23

## 2019-08-16 RX ADMIN — OXYCODONE HYDROCHLORIDE AND ACETAMINOPHEN 1 TABLET: 5; 325 TABLET ORAL at 06:46

## 2019-08-16 RX ADMIN — MUPIROCIN 1 APPLICATION: 20 OINTMENT TOPICAL at 08:23

## 2019-08-16 RX ADMIN — DOCUSATE SODIUM 100 MG: 100 CAPSULE, LIQUID FILLED ORAL at 18:35

## 2019-08-16 RX ADMIN — SODIUM CHLORIDE 100 ML/HR: 4.5 INJECTION, SOLUTION INTRAVENOUS at 03:20

## 2019-08-16 RX ADMIN — ASPIRIN 81 MG: 81 TABLET, COATED ORAL at 08:23

## 2019-08-16 RX ADMIN — KETOROLAC TROMETHAMINE 15 MG: 15 INJECTION, SOLUTION INTRAMUSCULAR; INTRAVENOUS at 08:23

## 2019-08-16 RX ADMIN — ESCITALOPRAM 10 MG: 10 TABLET, FILM COATED ORAL at 08:23

## 2019-08-16 RX ADMIN — OXYCODONE HYDROCHLORIDE AND ACETAMINOPHEN 2 TABLET: 5; 325 TABLET ORAL at 18:00

## 2019-08-16 RX ADMIN — OXYCODONE HYDROCHLORIDE AND ACETAMINOPHEN 500 MG: 500 TABLET ORAL at 08:23

## 2019-08-16 RX ADMIN — ATORVASTATIN CALCIUM 10 MG: 10 TABLET, FILM COATED ORAL at 20:01

## 2019-08-16 RX ADMIN — MUPIROCIN 1 APPLICATION: 20 OINTMENT TOPICAL at 20:01

## 2019-08-16 RX ADMIN — SODIUM CHLORIDE, PRESERVATIVE FREE 3 ML: 5 INJECTION INTRAVENOUS at 20:01

## 2019-08-16 RX ADMIN — ESTRADIOL 1 MG: 1 TABLET ORAL at 08:23

## 2019-08-16 RX ADMIN — MORPHINE SULFATE 6 MG: 2 INJECTION, SOLUTION INTRAMUSCULAR; INTRAVENOUS at 14:10

## 2019-08-16 NOTE — PLAN OF CARE
Problem: Patient Care Overview  Goal: Plan of Care Review  Outcome: Ongoing (interventions implemented as appropriate)   08/16/19 0259   Coping/Psychosocial   Plan of Care Reviewed With patient   OTHER   Outcome Summary VSS, DSG C/D/I, NEUROVASCULAR STATUS WNL, VOIDING WELL, REPORTS ADEQUATE PAIN CONTROL, STATES SHE IS TO BE DISCHARGED ON SATURDAY, WILL CONTINUE TO MONITOR   Plan of Care Review   Progress improving     Goal: Individualization and Mutuality  Outcome: Ongoing (interventions implemented as appropriate)    Goal: Discharge Needs Assessment  Outcome: Ongoing (interventions implemented as appropriate)    Goal: Interprofessional Rounds/Family Conf  Outcome: Ongoing (interventions implemented as appropriate)      Problem: Knee Arthroplasty (Total, Partial) (Adult)  Goal: Signs and Symptoms of Listed Potential Problems Will be Absent, Minimized or Managed (Knee Arthroplasty)  Outcome: Ongoing (interventions implemented as appropriate)    Goal: Anesthesia/Sedation Recovery  Outcome: Ongoing (interventions implemented as appropriate)      Problem: Fall Risk (Adult)  Goal: Absence of Fall  Outcome: Ongoing (interventions implemented as appropriate)

## 2019-08-16 NOTE — PROGRESS NOTES
"  Ortho POD 1    Patients Name:  Denisse Flores  YOB: 1960  Medical Records Number:  [id number]    No complaints except pain    /72 (BP Location: Right arm, Patient Position: Lying)   Pulse 96   Temp 98.2 °F (36.8 °C) (Oral)   Resp 18   Ht 165.1 cm (65\")   Wt 70.5 kg (155 lb 6.8 oz)   SpO2 97%   BMI 25.86 kg/m²     LLE:  NVI, calf nontender, Sensation intact  No signs of DVT    Incision: clean, no infection    Lab Results (last 24 hours)     Procedure Component Value Units Date/Time    Basic Metabolic Panel [[id number]]  (Normal) Collected:  08/16/19 0333    Specimen:  Blood Updated:  08/16/19 0455     Glucose 99 mg/dL      BUN 10 mg/dL      Creatinine 0.73 mg/dL      Sodium 136 mmol/L      Potassium 4.2 mmol/L      Chloride 99 mmol/L      CO2 28.3 mmol/L      Calcium 8.9 mg/dL      eGFR Non African Amer 82 mL/min/1.73      BUN/Creatinine Ratio 13.7     Anion Gap 8.7 mmol/L     Narrative:       GFR Normal >60  Chronic Kidney Disease <60  Kidney Failure <15    CBC (No Diff) [[id number]]  (Abnormal) Collected:  08/16/19 0333    Specimen:  Blood Updated:  08/16/19 0428     WBC 10.68 10*3/mm3      RBC 3.82 10*6/mm3      Hemoglobin 12.1 g/dL      Hematocrit 37.8 %      MCV 99.0 fL      MCH 31.7 pg      MCHC 32.0 g/dL      RDW 12.7 %      RDW-SD 46.4 fl      MPV 10.7 fL      Platelets 268 10*3/mm3     Wound Culture - Wound, Knee, Left [[id number]] Collected:  08/15/19 1304    Specimen:  Wound from Knee, Left Updated:  08/15/19 1439     Gram Stain No WBCs or organisms seen          Xr Knee 1 Or 2 View Left    Result Date: 8/15/2019  Narrative: XR KNEE 1 OR 2 VW LEFT-  INDICATIONS: Postoperative evaluation.  TECHNIQUE: Frontal and lateral views of the left knee  COMPARISON: 08/02/2019  FINDINGS:   Intact appearing knee arthroplasty hardware is seen with adjacent surgical soft tissue gas,  overlying skin staples. No acute fracture is identified.       Impression:  Postsurgical changes.    This " report was finalized on 8/15/2019 2:33 PM by Dr. Jason Jurado M.D.      Xr Knee 1 Or 2 View Left    Result Date: 8/2/2019  Narrative: XR KNEE 1 OR 2 VW LEFT-  INDICATIONS: Preoperative evaluation  TECHNIQUE: FRONTAL AND LATERAL VIEWS OF THE LEFT KNEE, STANDING  COMPARISON: 08/22/2017  FINDINGS:  Left knee arthroplasty hardware is noted. No acute fracture or erosion is identified. Trace knee effusion.      Impression:  No acute fracture is identified.  This report was finalized on 8/2/2019 3:05 PM by Dr. Jason Jurado M.D.      Xr Chest Pa & Lateral    Result Date: 8/2/2019  Narrative: XR CHEST PA AND LATERAL-  HISTORY: Female who is 59 years-old,  preoperative evaluation  TECHNIQUE: Frontal and lateral views of the chest  COMPARISON: 08/15/2017  FINDINGS: Heart, mediastinum and pulmonary vasculature are unremarkable. No focal pulmonary consolidation, pleural effusion, or pneumothorax. No acute osseous process.      Impression: No evidence for acute pulmonary process. Follow-up as clinically indicated.  This report was finalized on 8/2/2019 3:03 PM by Dr. Jason Jurado M.D.        S/p Left TKA revision  WBAT with walker  ASA for DVT prophylaxis  Home with home health after PT tomorrow, if comfortable and mobilizing safely    Pedro Priest MD  8/16/2019

## 2019-08-16 NOTE — PROGRESS NOTES
Continued Stay Note  [facility]     Patient Name: Denisse Flores  MRN: [id number]  Today's Date: 8/16/2019    Admit Date: 8/15/2019    Discharge Plan     Row Name 08/16/19 1153       Plan    Plan  [facility]    Patient/Family in Agreement with Plan  yes    Plan Comments  Spoke with pt, verified correct information on facesheet and explained the role of CCP. Pt would like to d/c home with [facility], referral given to Laurie with [facility] who states they are able to accept. Plan will be to d/c home with [facility] and family support. No other needs identified.     Final Discharge Disposition Code  06 - home with home health care    Final Note  Pt to d/c home with [facility]. Laurie with [facility] notified of d/c orders.        Discharge Codes    No documentation.       Expected Discharge Date and Time     Expected Discharge Date Expected Discharge Time    Aug 17, 2019             Maile Brownlee RN

## 2019-08-16 NOTE — PLAN OF CARE
Problem: Patient Care Overview  Goal: Plan of Care Review  Outcome: Ongoing (interventions implemented as appropriate)   08/16/19 1691   Coping/Psychosocial   Plan of Care Reviewed With patient   OTHER   Outcome Summary incr amb dist , c/o pain and fatigue ; plans home [facility]   Plan of Care Review   Progress improving

## 2019-08-16 NOTE — THERAPY TREATMENT NOTE
Acute Care - Physical Therapy Treatment Note  [facility]     Patient Name: Denisse Flores  : 1960  MRN: [id number]  Today's Date: 2019             Admit Date: 8/15/2019    Visit Dx:    ICD-10-CM ICD-9-CM   1. Pain due to any device, implant or graft T85.848A 996.70     Patient Active Problem List   Diagnosis   • OA (osteoarthritis) of knee   • Anxiety   • Dyspnea   • Leukocytosis   • Insomnia       Therapy Treatment    Rehabilitation Treatment Summary     Row Name 19 1504 19 0835          Treatment Time/Intention    Discipline  physical therapy assistant  -  physical therapy assistant  -     Document Type  therapy note (daily note)  -  therapy note (daily note)  -MARINA     Subjective Information  complains of;weakness;pain;fatigue;dizziness  -  complains of;weakness;fatigue;pain  -     Mode of Treatment  group therapy;physical therapy  -  group therapy;physical therapy  -2     Care Plan Review  --  patient/other agree to care plan  -     Existing Precautions/Restrictions  fall  -  fall  -     Recorded by [] Nadiya Hartman, PTA 19 1509 [JM] Nadiya Hartman, [facility] 19 1341  [JM2] Nadiya Hartman, [facility] 19 1509     Row Name 19 1504             Bed Mobility Assessment/Treatment    Sit-Supine [place] (Bed Mobility)  contact guard;supervision;verbal cues  -      Recorded by [] Nadiya Hartman, PTA 19 1509      Row Name 19 1504 19 0835          Sit-Stand Transfer    Sit-Stand [place] (Transfers)  contact guard;verbal cues  -  contact guard;verbal cues  -     Assistive Device (Sit-Stand Transfers)  walker, front-wheeled  -  walker, front-wheeled  -     Recorded by [] Nadiya Hartman, PTA 19 1509 [] Nadiya Hartman, [facility] 19 1430     Row Name 19 1504 19 0835          Gait/Stairs Assessment/Training    [place] Level (Gait)  contact guard;stand by assist;verbal cues  -  contact  guard;stand by assist;verbal cues  -     Assistive Device (Gait)  walker, front-wheeled  -JM  walker, front-wheeled  -JM     Distance in Feet (Gait)  100  -JM  60  -JM     Deviations/Abnormal Patterns (Gait)  antalgic;tre decreased  -JM  antalgic;tre decreased  -JM     Number of Steps (Stairs)  NO steps  -JM  reports NO stairs  -JM     Recorded by [JM] Nadiya Hartman, [facility] 08/16/19 1509 [JM] Nadiya Hartman, [facility] 08/16/19 1430     Row Name 08/16/19 0835             General ROM    GENERAL ROM COMMENTS  0-92  -JM      Recorded by [] Nadiya Hartman, [facility] 08/16/19 1430      Row Name 08/16/19 1504 08/16/19 0835          Therapeutic Exercise    Comment (Therapeutic Exercise)  TKR protocol x 20 reps, assist to initiate SLRs , then indep  -  TKR protocol x 15 reps w/assist for SLRs  -JM     Recorded by [JM] Nadiya Hartman, [facility] 08/16/19 1509 [JM] Nadiya Hartman, [facility] 08/16/19 1430     Row Name 08/16/19 1504 08/16/19 0835          Positioning and Restraints    Pre-Treatment Position  sitting in chair/recliner  -  sitting in chair/recliner  -JM     Post Treatment Position  bed  -JM  --     In Bed  fowlers;call light within reach;encouraged to call for assist;exit alarm on  -JM  --     In Chair  --  reclined;call light within reach;encouraged to call for assist;with nsg nsg getting fresh ice packs  -     Recorded by [] Nadiya Hartman, [facility] 08/16/19 1509 [JM] Nadiya Hartman, [facility] 08/16/19 1430     Row Name 08/16/19 1504 08/16/19 0835          Pain Scale: Numbers Pre/Post-Treatment    Pain Scale: Numbers, Pretreatment  8/10  -JM  5/10  -JM     Pain Scale: Numbers, Post-Treatment  --  -JM  6/10  -JM     Pain Location - Side  Left  -JM  Left  -JM     Pain Location  knee  -JM  knee  -JM     Pain Intervention(s)  Medication (See MAR);Repositioned;Cold applied  -  Medication (See MAR);Repositioned;Elevated  -JM     Recorded by [JM] Nadiya Hartman, PTA 08/16/19 1509 [JM] Nadiya Hartman, PTA 08/16/19  1430     Row Name                Wound 08/15/19 1316 Left leg Incision    Wound - Properties Group Date first assessed: 08/15/19 [AS] Time first assessed: 1316 [AS] Side: Left [AS] Location: leg [AS] Primary Wound Type: Incision [AS] Recorded by:  [AS] Mary Anne Millan, RN 08/15/19 1316      User Key  (r) = Recorded By, (t) = Taken By, (c) = Cosigned By    Initials Name Effective Dates Discipline    Nadiya Hawthorne PTA 03/07/18 -  PT    AS Mary Anne Millan, RN 06/16/16 -  Nurse          Wound 08/15/19 1316 Left leg Incision (Active)   Dressing Appearance dry;intact;no drainage 8/16/2019 12:00 PM   Closure DAVID 8/16/2019 12:00 PM   Base dressing in place, unable to visualize 8/16/2019 12:00 PM   Periwound dry;intact;pink 8/16/2019 12:00 PM   Periwound Temperature warm 8/16/2019 12:00 PM   Periwound Skin Turgor soft 8/16/2019 12:00 PM   Drainage Amount none 8/16/2019 12:00 PM   Dressing Care, Wound dressing changed;dressing applied 8/16/2019  5:45 AM           Physical Therapy Education     Title: PT OT SLP Therapies (Done)     Topic: Physical Therapy (Done)     Point: Mobility training (Done)     Learning Progress Summary           Patient Acceptance, E,TB,D, VU,NR by MARINA at 8/16/2019  2:59 PM    Acceptance, E,TB,D, VU,NR by RIANNA at 8/15/2019  4:37 PM                   Point: Home exercise program (Done)     Learning Progress Summary           Patient Acceptance, E,TB,D, VU,NR by MARINA at 8/16/2019  2:59 PM    Acceptance, E,TB,D, VU,NR by RIANNA at 8/15/2019  4:37 PM                   Point: Body mechanics (Done)     Learning Progress Summary           Patient Acceptance, E,TB,D, VU,NR by MARINA at 8/16/2019  2:59 PM    Acceptance, E,TB,D, VU,NR by RIANNA at 8/15/2019  4:37 PM                   Point: Precautions (Done)     Learning Progress Summary           Patient Acceptance, E,TB,D, VU,NR by MARINA at 8/16/2019  2:59 PM    Acceptance, E,TB,D, VU,NR by BK at 8/15/2019  4:37 PM                               User Key     Initials  Effective Dates Name Provider Type Discipline     03/07/18 -  Nadiya Hartman PTA Physical Therapy Assistant PT    BK 07/05/19 -  Isaías Calderón, PT Student PT Student PT                PT Recommendation and Plan     Plan of Care Reviewed With: patient  Progress: improving  Outcome Summary: incr amb dist , c/o pain and fatigue ; plans home [facility]  Outcome Measures     Row Name 08/16/19 1400             How much help from another person do you currently need...    Turning from your back to your side while in flat bed without using bedrails?  4  -JM      Moving from lying on back to sitting on the side of a flat bed without bedrails?  3  -JM      Moving to and from a bed to a chair (including a wheelchair)?  3  -JM      Standing up from a chair using your arms (e.g., wheelchair, bedside chair)?  3  -JM      Climbing 3-5 steps with a railing?  3  -JM      To walk in hospital room?  3  -JM      AM-PAC 6 Clicks Score (PT)  19  -JM        User Key  (r) = Recorded By, (t) = Taken By, (c) = Cosigned By    Initials Name Provider Type     Nadiya Hartman PTA Physical Therapy Assistant         Time Calculation:   PT Charges     Row Name 08/16/19 1510 08/16/19 1502          Time Calculation    Start Time  1300  -  0835  -     Stop Time  1351  -  0920  -     Time Calculation (min)  51 min  -  45 min  -     PT Received On  08/16/19  -  08/16/19  -     PT - Next Appointment  08/17/19  -  08/16/19  -        Time Calculation- PT    Total Timed Code Minutes- PT  23 minute(s)  -JM  20 minute(s)  -       User Key  (r) = Recorded By, (t) = Taken By, (c) = Cosigned By    Initials Name Provider Type    Nadiya Hawthorne PTA Physical Therapy Assistant        Therapy Charges for Today     Code Description Service Date Service Provider Modifiers Qty    73927323417 HC PT THER PROC EA 15 MIN 8/16/2019 Nadiya Hartman PTA GP 1    46052248627 HC PT THER PROC GROUP 8/16/2019 Nadiya Hartman PTA GP 1     [id number]  PT THER PROC EA 15 MIN 8/16/2019 Nadiya Hartman PTA GP 2    [id number]  PT THER PROC GROUP 8/16/2019 Nadiya Hartman PTA GP 1          PT G-Codes  Outcome Measure Options: AM-PAC 6 Clicks Basic Mobility (PT)  AM-PAC 6 Clicks Score (PT): 19    Nadiya Hartman PTA  8/16/2019

## 2019-08-16 NOTE — PLAN OF CARE
Problem: Patient Care Overview  Goal: Plan of Care Review  Outcome: Ongoing (interventions implemented as appropriate)   08/16/19 7879   Coping/Psychosocial   Plan of Care Reviewed With patient   OTHER   Outcome Summary 59/F POD#1 left TKA revision w/ poly change. ALOx4, RA, lungs clear, BS hypoactive but passing gas, voiding independently. Up x1 BRP with walker. Pain control issues today, needed IV pain medication this afternoon. No numbness/tingling noted per patient. Push/pull strength moderate, 2+ pedal pulses bilaterally. Plans to D/C home with home health tomorrow.   Plan of Care Review   Progress improving     Goal: Individualization and Mutuality  Outcome: Outcome(s) achieved Date Met: 08/16/19    Goal: Discharge Needs Assessment  Outcome: Ongoing (interventions implemented as appropriate)    Goal: Interprofessional Rounds/Family Conf  Outcome: Ongoing (interventions implemented as appropriate)      Problem: Knee Arthroplasty (Total, Partial) (Adult)  Goal: Signs and Symptoms of Listed Potential Problems Will be Absent, Minimized or Managed (Knee Arthroplasty)  Outcome: Ongoing (interventions implemented as appropriate)    Goal: Anesthesia/Sedation Recovery  Outcome: Ongoing (interventions implemented as appropriate)      Problem: Fall Risk (Adult)  Goal: Absence of Fall  Outcome: Ongoing (interventions implemented as appropriate)

## 2019-08-16 NOTE — DISCHARGE SUMMARY
Orthopaedic Surgery Discharge Summary    Patient Name:  Denisse Flores  YOB: 1960  Medical Records Number:  [id number]    Date of Admission:  8/15/2019  Date of Discharge:  8/17/2019    Primary Discharge Diagnosis:  OA (osteoarthritis) of knee [M17.10]    Secondary Discharge Diagnosis:    Problem List Items Addressed This Visit     None      Visit Diagnoses     Pain due to any device, implant or graft        Relevant Orders    Wound Culture - Wound, Knee, Left (Completed)          Procedures Performed:  Left Total Knee Arthroplasty      Hospital Course:    Denisse Flores is a 59 y.o.  female who underwent successful left tka revision on 8/15/2019.  Denisse Flores was started on Aspirin 81 mg po twice daily immediately post-operatively for DVT prophylaxis.  On post-op day 1 the patients dressing was changed and their incision was clean, with no signs of infection and their calf was soft, with no signs of DVT.  The patient progressed well with physical therapy and the patients hemoglobin remained stable. On post-operative day 2 the patient was felt ready for discharge.     Total Knee Joint Replacement Discharge Instructions:    I. ACTIVITIES:  1. Exercises:  ? Complete exercise program as taught by the hospital physical therapist 2 times per day  ? Exercise program will be advanced by the physical therapist  ? During the day be up ambulating every 2 hours (while awake) for short distances  ? Complete the ankle pump exercises at least 10 times per hour (while awake)  ? Elevate legs most of the day the first week post operatively and thereafter elevate legs when in bed and for at least 30 minutes during the day. Caution must be taken to avoid pillow placement under the bend of the knee as this can led to flexion contractures of the knee.  ? Use cold packs 20-30 minutes approximately 5 times per day. This should be done before and after completing your exercises and at any time you are experiencing  pain/ stiffness in your operative extremity.      2. Activities of Daily Living:  ? No tub baths, hot tubs, or swimming pools for 4 weeks  ? May shower and let water run over the incision on post-operative day #5 if no drainage. Do not scrub or rub the incision. Simply let the water run over the incision and pat dry.    II. Restrictions  ? Do not cross legs or kneel  ? Your surgeon will discuss with you when you will be able to drive again.  ? Weight bearing is as tolerated  ? First week stay inside on even terrain. May go up and down stairs one stair at a time utilizing the hand rail  ? After one week, you may venture outside.    III. Precautions:  ? Everyone that comes near you should wash their hands  ? No elective dental, genital-urinary, or colon procedures or surgical procedures for 12 weeks after surgery unless absolutely necessary.  ?  If dental work or surgical procedure is deemed absolutely necessary, you will need to contact your surgeon as you will need to take antibiotics 1 hour prior to any dental work (including teeth cleanings).  ? Please discuss with your surgeon prophylactic antibiotics as the length of time this intervention will be necessary for you varies with each patient’s health history and situation.  ? Avoid sick people. If you must be around someone who is ill, they should wear a mask.  ? Avoid visits to the Emergency Room or Urgent Care unless you are having a life threatening event.   ? If ordered stockings are to be placed on in the morning and removed at night. Monitor the stockings to ensure that any swelling is not causing the stockings to become too tight. In this case, remove stockings immediately.    IV. INCISION CARE:  ? Wash your hands prior to dressing changes  ? Change the dressing as needed to keep incision clean and dry. Utilize dry gauze and paper tape. Avoid touching the side of the gauze that goes against the incision with your hands.  ? No creams or ointments to the  incision  ? May remove dressing once the incision is free of drainage  ? Do not touch or pick at the incision  ? Check incision every day and notify surgeon immediately if any of the following signs or symptoms are noted:  o Increase in redness  o Increase in swelling around the incision and of the entire extremity  o Increase in pain  o Drainage oozing from the incision  o Pulling apart of the edges of the incision  o Increase in overall body temperature (greater than 100.5 degrees)  ? Your surgeon will instruct you regarding suture or staple removal    V. Medications:   1. Anticoagulants: You will be discharged on an anticoagulant. This is a prophylactic medication that helps prevent blood clots during your post-operative period. The type and length of dosage varies based on your individual needs, procedure performed, and surgeon’s preference.  ? While taking the anticoagulant, you should avoid taking any additional aspirin, ibuprofen (Advil or Motrin), Aleve (Naprosyn) or other non-steroidal anti-inflammatory medications.   ? Notify surgeon immediately if any bay bleeding is noted in the urine, stool, emesis, or from the nose or the incision. Blood in the stool will often appear as black rather than red. Blood in urine may appear as pink. Blood in emesis may appear as brown/black like coffee grounds.  ? You will need to apply pressure for longer periods of time to any cuts or abrasions to stop bleeding  ? Avoid alcohol while taking anticoagulants    2. Stool Softeners: You will be at greater risk of constipation after surgery due to being less mobile and the pain medications.   ? Take stool softeners as instructed by your surgeon while on pain medications. Over the counter Colace 100 mg 1-2 capsules twice daily.   ? If stools become too loose or too frequent, please decreases the dosage or stop the stool softener.  ? If constipation occurs despite use of stool softeners, you are to continue the stool softeners  and add a laxative (Milk of Magnesia 1 ounce daily as needed)  ? Drink plenty of fluids, and eat fruits and vegetables during your recovery time    3. Pain Medications utilized after surgery are narcotics and the law requires that the following information be given to all patients that are prescribed narcotics:  ? CLASSIFICATION: Pain medications are called Opioids and are narcotics  ? LEGALITIES: It is illegal to share narcotics with others and to drive within 24 hours of taking narcotics  ? POTENTIAL SIDE EFFECTS: Potential side effects of opioids include: nausea, vomiting, itching, dizziness, drowsiness, dry mouth, constipation, and difficulty urinating.  ? POTENTIAL ADVERSE EFFECTS:   o Opioid tolerance can develop with use of pain medications and this simply means that it requires more and more of the medication to control pain; however, this is seen more in patients that use opioids for longer periods of time.  o Opioid dependence can develop with use of Opioids and this simply means that to stop the medication can cause withdrawal symptoms; however, this is seen with patients that use Opioids for longer periods of time.  o Opioid addiction can develop with use of Opioids and the incidence of this is very unlikely in patients who take the medications as ordered and stop the medications as instructed.  o Opioid overdose can be dangerous, but is unlikely when the medication is taken as ordered and stopped when ordered. It is important not to mix opioids with alcohol or with and type of sedative such as Benadryl as this can lead to over sedation and respiratory difficulty.  ? DOSAGE:   o Pain medications will need to be taken consistently for the first week to decrease pain and promote adequate pain relief and participation in physical therapy.  o After the initial surgical pain begins to resolve, you may begin to decrease the pain medication. By the end of 6-8 weeks, you should be off of pain  medications.  o Refills will not be given by the office during evening hours, on weekends, or after 6-8 weeks post-op.  o To seek refills on pain medications during the initial 6 week post-operative period, you must call the office 48 hours in advance to request the refill. The office will then notify you when to  the prescription. DO NOT wait until you are out of the medication to request a refill.    V. FOLLOW-UP VISITS:  ? You will need to follow up in the office with your surgeon in 3 weeks. Please call this number [phone] to schedule this appointment.  If you have any concerns or suspected complications prior to your follow up visit, please call your surgeons office. Do not wait until your appointment time if you suspect complications. These will need to be addressed in the office promptly.      Discharge Medications:     1) Percocet 10/325 mg  1-2 po q 4-6 hours for pain control  2)  Enteric Coated Aspirin 81 mg po twice daily for 4 weeks, then once daily for 2 weeks.    Pedro Priest MD  8/16/2019    CC:Johanna Anglin, APRN:Pedro Priest MD

## 2019-08-16 NOTE — DISCHARGE PLACEMENT REQUEST
"Denisse Flores (59 y.o. Female)     Date of Birth Social Security Number Address Home Phone MRN    1960  [id number] [facility] [address] [place] [address] [phone] [id number]    [employer]aritan Marital Status          Mandaeism        Admission Date Admission Type Admitting Provider Attending Provider Department, Room/Bed    8/15/19 Elective Pedro Priets MD Goodin, Robert A, MD [address], P890/1    Discharge Date Discharge Disposition Discharge Destination         Home or Self Care              Attending Provider:  Pedro Priest MD    Allergies:  Celecoxib    Isolation:  None   Infection:  None   Code Status:  CPR    Ht:  165.1 cm (65\")   Wt:  70.5 kg (155 lb 6.8 oz)    Admission Cmt:  None   Principal Problem:  None                Active Insurance as of 8/15/2019     Primary Coverage     Payor Plan Insurance Group Employer/Plan Group    [employer]HE[employer] BLUE CROSS AN[employer]M BLUE CROSS BLUE SHIELD PPO [id number]     Payor Plan Address Payor Plan Phone Number Payor Plan Fax Number Effective Dates    [address] [phone]  4/1/2012 - None Entered    [facility] [id number]       Subscriber Name Subscriber Birth Date Member ID       KENNEDY FLORES 1960 [id number]                 Emergency Contacts      (Rel.) Home Phone Work Phone Mobile Phone    Kennedy Flores (Spouse) [phone] -- [phone]          "

## 2019-08-16 NOTE — THERAPY TREATMENT NOTE
Acute Care - Physical Therapy Treatment Note  [facility]     Patient Name: Denisse Flores  : 1960  MRN: [id number]  Today's Date: 2019             Admit Date: 8/15/2019    Visit Dx:    ICD-10-CM ICD-9-CM   1. Pain due to any device, implant or graft T85.848A 996.70     Patient Active Problem List   Diagnosis   • OA (osteoarthritis) of knee   • Anxiety   • Dyspnea   • Leukocytosis   • Insomnia       Therapy Treatment    Rehabilitation Treatment Summary     Row Name 19 0835             Treatment Time/Intention    Discipline  physical therapy assistant  -      Document Type  therapy note (daily note)  -      Subjective Information  complains of;weakness;fatigue;pain  -      Care Plan Review  patient/other agree to care plan  -      Existing Precautions/Restrictions  fall  -      Recorded by [] Nadiya Hartman [facility] 19 1341      Row Name 19 0835             Sit-Stand Transfer    Sit-Stand [place] (Transfers)  contact guard;verbal cues  -      Assistive Device (Sit-Stand Transfers)  walker, front-wheeled  -      Recorded by [] Nadiya Hartman [facility] 19 1430      Row Name 19 0835             Gait/Stairs Assessment/Training    [place] Level (Gait)  contact guard;stand by assist;verbal cues  -      Assistive Device (Gait)  walker, front-wheeled  -      Distance in Feet (Gait)  60  -      Deviations/Abnormal Patterns (Gait)  antalgic;tre decreased  -      Number of Steps (Stairs)  reports NO stairs  -JM      Recorded by [] Nadiya Hartman [facility] 19 1430      Row Name 19 0835             General ROM    GENERAL ROM COMMENTS  0-92  -      Recorded by [] Nadiya Hartman [facility] 19 1430      Row Name 19 0835             Therapeutic Exercise    Comment (Therapeutic Exercise)  TKR protocol x 15 reps w/assist for SLRs  -      Recorded by [] Nadiya Hartman [facility] 19 1430      Row Name 19 0835              Positioning and Restraints    Pre-Treatment Position  sitting in chair/recliner  -JM      In Chair  reclined;call light within reach;encouraged to call for assist;with nsg nsg getting fresh ice packs  -      Recorded by [JM] Nadiya Hartman PTA 08/16/19 1430      Row Name 08/16/19 0835             Pain Scale: Numbers Pre/Post-Treatment    Pain Scale: Numbers, Pretreatment  5/10  -JM      Pain Scale: Numbers, Post-Treatment  6/10  -JM      Pain Location - Side  Left  -JM      Pain Location  knee  -JM      Pain Intervention(s)  Medication (See MAR);Repositioned;Elevated  -      Recorded by [JM] Nadiya Hartman PTA 08/16/19 1430      Row Name                Wound 08/15/19 1316 Left leg Incision    Wound - Properties Group Date first assessed: 08/15/19 [AS] Time first assessed: 1316 [AS] Side: Left [AS] Location: leg [AS] Primary Wound Type: Incision [AS] Recorded by:  [AS] Mary Anne Millan, RN 08/15/19 1316      User Key  (r) = Recorded By, (t) = Taken By, (c) = Cosigned By    Initials Name Effective Dates Discipline     Nadiya Hartman PTA 03/07/18 -  PT    AS Mary Anne Millan, RN 06/16/16 -  Nurse          Wound 08/15/19 1316 Left leg Incision (Active)   Dressing Appearance dry;intact;no drainage 8/16/2019 12:00 PM   Closure DAVID 8/16/2019 12:00 PM   Base dressing in place, unable to visualize 8/16/2019 12:00 PM   Periwound dry;intact;pink 8/16/2019 12:00 PM   Periwound Temperature warm 8/16/2019 12:00 PM   Periwound Skin Turgor soft 8/16/2019 12:00 PM   Drainage Amount none 8/16/2019 12:00 PM   Dressing Care, Wound dressing changed;dressing applied 8/16/2019  5:45 AM           Physical Therapy Education     Title: PT OT SLP Therapies (Done)     Topic: Physical Therapy (Done)     Point: Mobility training (Done)     Learning Progress Summary           Patient Acceptance, E,TB,D, VU,NR by MARINA at 8/16/2019  2:59 PM    Acceptance, E,TB,D, VU,NR by RIANNA at 8/15/2019  4:37 PM                   Point: Home exercise  program (Done)     Learning Progress Summary           Patient Acceptance, E,TB,D, VU,NR by MARINA at 8/16/2019  2:59 PM    Acceptance, E,TB,D, VU,NR by RIANNA at 8/15/2019  4:37 PM                   Point: Body mechanics (Done)     Learning Progress Summary           Patient Acceptance, E,TB,D, VU,NR by MARINA at 8/16/2019  2:59 PM    Acceptance, E,TB,D, VU,NR by RIANNA at 8/15/2019  4:37 PM                   Point: Precautions (Done)     Learning Progress Summary           Patient Acceptance, E,TB,D, VU,NR by MARINA at 8/16/2019  2:59 PM    Acceptance, E,TB,D, VU,NR by RIANNA at 8/15/2019  4:37 PM                               User Key     Initials Effective Dates Name Provider Type Discipline     03/07/18 -  Nadiya Hartman PTA Physical Therapy Assistant PT    RIANNA 07/05/19 -  Felton Calderón., PT Student PT Student PT                PT Recommendation and Plan     Plan of Care Reviewed With: patient  Progress: improving  Outcome Summary: incr amb dist , c/o pain and fatigue ; plans home SAT  Outcome Measures     Row Name 08/16/19 1400             How much help from another person do you currently need...    Turning from your back to your side while in flat bed without using bedrails?  4  -JM      Moving from lying on back to sitting on the side of a flat bed without bedrails?  3  -JM      Moving to and from a bed to a chair (including a wheelchair)?  3  -JM      Standing up from a chair using your arms (e.g., wheelchair, bedside chair)?  3  -JM      Climbing 3-5 steps with a railing?  3  -JM      To walk in hospital room?  3  -JM      AM-PAC 6 Clicks Score (PT)  19  -        User Key  (r) = Recorded By, (t) = Taken By, (c) = Cosigned By    Initials Name Provider Type    Nadiya Hawthorne PTA Physical Therapy Assistant         Time Calculation:   PT Charges     Row Name 08/16/19 1502             Time Calculation    Start Time  0835  -      Stop Time  0920  -      Time Calculation (min)  45 min  -      PT Received On   08/16/19  -MARINA      PT - Next Appointment  08/16/19  -MARINA         Time Calculation- PT    Total Timed Code Minutes- PT  20 minute(s)  -MARINA        User Key  (r) = Recorded By, (t) = Taken By, (c) = Cosigned By    Initials Name Provider Type    Nadiya Hawthorne PTA Physical Therapy Assistant        Therapy Charges for Today     Code Description Service Date Service Provider Modifiers Qty    39737339753 HC PT THER PROC EA 15 MIN 8/16/2019 Nadiya Hartman PTA GP 1    06337152953 HC PT THER PROC GROUP 8/16/2019 Nadiya Hartman PTA GP 1          PT G-Codes  Outcome Measure Options: AM-PAC 6 Clicks Basic Mobility (PT)  AM-PAC 6 Clicks Score (PT): 19    Nadiya Hartman PTA  8/16/2019

## 2019-08-17 VITALS
HEIGHT: 65 IN | BODY MASS INDEX: 25.9 KG/M2 | HEART RATE: 85 BPM | WEIGHT: 155.42 LBS | RESPIRATION RATE: 18 BRPM | TEMPERATURE: 98.3 F | DIASTOLIC BLOOD PRESSURE: 69 MMHG | OXYGEN SATURATION: 94 % | SYSTOLIC BLOOD PRESSURE: 126 MMHG

## 2019-08-17 LAB
DEPRECATED RDW RBC AUTO: 46.6 FL (ref 37–54)
ERYTHROCYTE [DISTWIDTH] IN BLOOD BY AUTOMATED COUNT: 12.8 % (ref 12.3–15.4)
HCT VFR BLD AUTO: 37.2 % (ref 34–46.6)
HGB BLD-MCNC: 12.1 G/DL (ref 12–15.9)
MCH RBC QN AUTO: 32.2 PG (ref 26.6–33)
MCHC RBC AUTO-ENTMCNC: 32.5 G/DL (ref 31.5–35.7)
MCV RBC AUTO: 98.9 FL (ref 79–97)
PLATELET # BLD AUTO: 250 10*3/MM3 (ref 140–450)
PMV BLD AUTO: 10.1 FL (ref 6–12)
RBC # BLD AUTO: 3.76 10*6/MM3 (ref 3.77–5.28)
WBC NRBC COR # BLD: 11.08 10*3/MM3 (ref 3.4–10.8)

## 2019-08-17 PROCEDURE — 97150 GROUP THERAPEUTIC PROCEDURES: CPT

## 2019-08-17 PROCEDURE — 25010000002 MORPHINE PER 10 MG: Performed by: ORTHOPAEDIC SURGERY

## 2019-08-17 PROCEDURE — 85027 COMPLETE CBC AUTOMATED: CPT | Performed by: ORTHOPAEDIC SURGERY

## 2019-08-17 PROCEDURE — 97110 THERAPEUTIC EXERCISES: CPT

## 2019-08-17 RX ORDER — OXYCODONE AND ACETAMINOPHEN 10; 325 MG/1; MG/1
1 TABLET ORAL EVERY 4 HOURS PRN
Status: DISCONTINUED | OUTPATIENT
Start: 2019-08-17 | End: 2019-08-17 | Stop reason: HOSPADM

## 2019-08-17 RX ORDER — OXYCODONE AND ACETAMINOPHEN 10; 325 MG/1; MG/1
1-2 TABLET ORAL EVERY 4 HOURS PRN
Qty: 84 TABLET | Refills: 0 | Status: SHIPPED | OUTPATIENT
Start: 2019-08-17 | End: 2020-09-30

## 2019-08-17 RX ORDER — OXYCODONE AND ACETAMINOPHEN 10; 325 MG/1; MG/1
2 TABLET ORAL EVERY 4 HOURS PRN
Status: DISCONTINUED | OUTPATIENT
Start: 2019-08-17 | End: 2019-08-17 | Stop reason: HOSPADM

## 2019-08-17 RX ADMIN — OXYCODONE HYDROCHLORIDE AND ACETAMINOPHEN 2 TABLET: 5; 325 TABLET ORAL at 07:21

## 2019-08-17 RX ADMIN — Medication 1000 MCG: at 08:32

## 2019-08-17 RX ADMIN — OXYCODONE HYDROCHLORIDE AND ACETAMINOPHEN 500 MG: 500 TABLET ORAL at 08:32

## 2019-08-17 RX ADMIN — ASPIRIN 81 MG: 81 TABLET, COATED ORAL at 08:33

## 2019-08-17 RX ADMIN — OXYCODONE HYDROCHLORIDE AND ACETAMINOPHEN 2 TABLET: 10; 325 TABLET ORAL at 12:03

## 2019-08-17 RX ADMIN — ASPIRIN 81 MG: 81 TABLET, COATED ORAL at 16:44

## 2019-08-17 RX ADMIN — ESCITALOPRAM 10 MG: 10 TABLET, FILM COATED ORAL at 08:33

## 2019-08-17 RX ADMIN — ESTRADIOL 1 MG: 1 TABLET ORAL at 08:33

## 2019-08-17 RX ADMIN — OXYCODONE HYDROCHLORIDE AND ACETAMINOPHEN 2 TABLET: 5; 325 TABLET ORAL at 02:22

## 2019-08-17 RX ADMIN — MORPHINE SULFATE 6 MG: 2 INJECTION, SOLUTION INTRAMUSCULAR; INTRAVENOUS at 03:02

## 2019-08-17 NOTE — PLAN OF CARE
Problem: Patient Care Overview  Goal: Plan of Care Review   08/17/19 1115   Coping/Psychosocial   Plan of Care Reviewed With patient   OTHER   Outcome Summary Improved tolerance to functional activity this date with an increase in gait distance and progression of ther. ex. protocol.   Plan of Care Review   Progress improving

## 2019-08-17 NOTE — PLAN OF CARE
Problem: Patient Care Overview  Goal: Plan of Care Review   08/16/19 1537 08/17/19 0135   Coping/Psychosocial   Plan of Care Reviewed With patient --    OTHER   Outcome Summary --  POD 2 left TKA revision with poly change. Dressing CDI. Pain controlled with current pain medication. Needs encouragment using IS. Temp did spike to 100.0 but went down to 99.0 after using IS. Plan is to DC home with HH on 8/17/19.   Plan of Care Review   Progress improving --        Problem: Fall Risk (Adult)  Goal: Absence of Fall   08/16/19 1537   Fall Risk (Adult)   Absence of Fall making progress toward outcome

## 2019-08-17 NOTE — THERAPY TREATMENT NOTE
Patient Name: Denisse Flores  : 1960    MRN: [id number]                              Today's Date: 2019       Admit Date: 8/15/2019    Visit Dx:     ICD-10-CM ICD-9-CM   1. Pain due to any device, implant or graft T85.848A 996.70     Patient Active Problem List   Diagnosis   • OA (osteoarthritis) of knee   • Anxiety   • Dyspnea   • Leukocytosis   • Insomnia     Past Medical History:   Diagnosis Date   • Anxiety    • Arthritis    • Environmental allergies    • High cholesterol    • History of panic attacks     POST OP W/ LAST KNEE SURGERY   • History of skin cancer 'S   • History of transfusion    • Insomnia    • Knee pain    • Migraine    • Vitamin D deficiency      Past Surgical History:   Procedure Laterality Date   •  SECTION      X 3   • CHOLECYSTECTOMY     • HYSTERECTOMY     • KNEE MINI REVISION Left 8/15/2019    Procedure: LEFT TOTAL KNEE ARTHROPLASTY REVISION POLY CHANGE;  Surgeon: Pedro Priest MD;  Location: [facility];  Service: Orthopedics   • MI TOTAL KNEE ARTHROPLASTY Left 2017    Procedure: LT TOTAL KNEE ARTHROPLASTY;  Surgeon: Pedro Priest MD;  Location: [facility];  Service: Orthopedics     General Information     Row Name 19 1113          PT Evaluation Time/Intention    Document Type  therapy note (daily note)  -MS     Mode of Treatment  physical therapy  -MS     Row Name 19 1113          Cognitive Assessment/Intervention- PT/OT    Orientation Status (Cognition)  oriented x 3  -MS       User Key  (r) = Recorded By, (t) = Taken By, (c) = Cosigned By    Initials Name Provider Type    MS New Dill, PT Physical Therapist        Mobility     Row Name 19 1113          Bed Mobility Assessment/Treatment    Comment (Bed Mobility)  Up in chair this AM for the gym  -MS     Row Name 19 1113          Sit-Stand Transfer    Sit-Stand [place] (Transfers)  stand by assist  -MS     Assistive Device (Sit-Stand Transfers)  walker,  front-wheeled  -MS     Row Name 08/17/19 1113          Gait/Stairs Assessment/Training    [place] Level (Gait)  stand by assist  -MS     Assistive Device (Gait)  walker, front-wheeled  -MS     Distance in Feet (Gait)  120 feet  -MS     Deviations/Abnormal Patterns (Gait)  antalgic  -MS     Bilateral Gait Deviations  forward flexed posture  -MS       User Key  (r) = Recorded By, (t) = Taken By, (c) = Cosigned By    Initials Name Provider Type    New Wise, PT Physical Therapist        Obj/Interventions     Row Name 08/17/19 1114          General ROM    GENERAL ROM COMMENTS  Left Knee AROM (10, 72)  -MS       User Key  (r) = Recorded By, (t) = Taken By, (c) = Cosigned By    Initials Name Provider Type    New Wise, PT Physical Therapist        Goals/Plan    No documentation.       Clinical Impression     Row Name 08/17/19 1115          Pain Assessment    Additional Documentation  Pain Scale: Numbers Pre/Post-Treatment (Group)  -MS     Row Name 08/17/19 1115          Pain Scale: Numbers Pre/Post-Treatment    Pain Scale: Numbers, Pretreatment  3/10  -MS     Pain Scale: Numbers, Post-Treatment  3/10  -MS     Row Name 08/17/19 1115 08/17/19 0833       Plan of Care Review    Plan of Care Reviewed With  patient  -MS  patient  -LJ    [facility] Name 08/17/19 1115          Positioning and Restraints    Pre-Treatment Position  sitting in chair/recliner  -MS     Post Treatment Position  chair  -MS     In Chair  notified nsg;reclined;sitting;call light within reach;encouraged to call for assist  -MS       User Key  (r) = Recorded By, (t) = Taken By, (c) = Cosigned By    Initials Name Provider Type    Brooke Galan, RN Registered Nurse    New Wise, PT Physical Therapist        Outcome Measures     Row Name 08/17/19 1116          How much help from another person do you currently need...    Turning from your back to your side while in flat bed without using bedrails?  4  -MS     Moving from lying  on back to sitting on the side of a flat bed without bedrails?  4  -MS     Moving to and from a bed to a chair (including a wheelchair)?  3  -MS     Standing up from a chair using your arms (e.g., wheelchair, bedside chair)?  3  -MS     Climbing 3-5 steps with a railing?  3  -MS     To walk in hospital room?  3  -MS     AM-PAC 6 Clicks Score (PT)  20  -MS       User Key  (r) = Recorded By, (t) = Taken By, (c) = Cosigned By    Initials Name Provider Type    New Wise, PT Physical Therapist        Physical Therapy Education     Title: PT OT SLP Therapies (Done)     Topic: Physical Therapy (Done)     Point: Mobility training (Done)     Learning Progress Summary           Patient Acceptance, E,D, VU,NR by MS at 8/17/2019 11:15 AM    Acceptance, E,TB,D, VU,NR by MARINA at 8/16/2019  2:59 PM    Acceptance, E,TB,D, VU,NR by RIANNA at 8/15/2019  4:37 PM                   Point: Home exercise program (Done)     Learning Progress Summary           Patient Acceptance, E,D, VU,NR by MS at 8/17/2019 11:15 AM    Acceptance, E,TB,D, VU,NR by MARINA at 8/16/2019  2:59 PM    Acceptance, E,TB,D, VU,NR by RIANNA at 8/15/2019  4:37 PM                   Point: Body mechanics (Done)     Learning Progress Summary           Patient Acceptance, E,D, VU,NR by MS at 8/17/2019 11:15 AM    Acceptance, E,TB,D, VU,NR by MARINA at 8/16/2019  2:59 PM    Acceptance, E,TB,D, VU,NR by RIANNA at 8/15/2019  4:37 PM                   Point: Precautions (Done)     Learning Progress Summary           Patient Acceptance, E,D, VU,NR by MS at 8/17/2019 11:15 AM    Acceptance, E,TB,D, VU,NR by  at 8/16/2019  2:59 PM    Acceptance, E,TB,D, VU,NR by RIANNA at 8/15/2019  4:37 PM                               User Key     Initials Effective Dates Name Provider Type Discipline     03/07/18 -  Nadiya Hartman PTA Physical Therapy Assistant PT    MS 04/03/18 -  New Dill, PT Physical Therapist PT    BK 07/05/19 -  Felton Calderón., PT Student PT Student PT               PT Recommendation and Plan     Plan of Care Reviewed With: patient  Progress: improving  Outcome Summary: Improved tolerance to functional activity this date with an increase in gait distance and progression of ther. ex. protocol.     Time Calculation:   PT Charges     Row Name 08/17/19 1116             Time Calculation    Start Time  0840  -MS      Stop Time  0905  -MS      Time Calculation (min)  25 min  -MS      PT Received On  08/17/19  -MS      PT - Next Appointment  08/17/19  -MS         Time Calculation- PT    Total Timed Code Minutes- PT  17 minute(s)  -MS        User Key  (r) = Recorded By, (t) = Taken By, (c) = Cosigned By    Initials Name Provider Type    MS New Dill, PT Physical Therapist        Therapy Charges for Today     Code Description Service Date Service Provider Modifiers Qty    97177369165 HC PT THER PROC EA 15 MIN 8/17/2019 New Dill, PT GP 1    29079452798 HC PT THER PROC GROUP 8/17/2019 New Dill, PT GP 1          PT G-Codes  Outcome Measure Options: AM-PAC 6 Clicks Basic Mobility (PT)  AM-PAC 6 Clicks Score (PT): 20    New Dill, EDISON  8/17/2019

## 2019-08-17 NOTE — PLAN OF CARE
Problem: Patient Care Overview  Goal: Plan of Care Review  Outcome: Outcome(s) achieved Date Met: 08/17/19 08/17/19 1406   Coping/Psychosocial   Plan of Care Reviewed With patient   OTHER   Outcome Summary Patient is ambulating using walker and stand by assist. Vitals are stable and voiding function is intact. Pain is managed with po meds, pain control improved after med adjustment this am. Patient educated on safety precautions, infection s/s and prevention and IS. Patient is prepared and ready for d/c home with hh.    Plan of Care Review   Progress improving       Problem: Knee Arthroplasty (Total, Partial) (Adult)  Goal: Signs and Symptoms of Listed Potential Problems Will be Absent, Minimized or Managed (Knee Arthroplasty)  Outcome: Outcome(s) achieved Date Met: 08/17/19 08/17/19 1406   Goal/Outcome Evaluation   Problems Assessed (Knee Arthroplasty) all   Problems Present (Knee Arthroplasty) pain;range of motion decreased;functional deficit     Goal: Anesthesia/Sedation Recovery  Outcome: Outcome(s) achieved Date Met: 08/17/19 08/17/19 1406   Goal/Outcome Evaluation   Anesthesia/Sedation Recovery recovered to baseline       Problem: Fall Risk (Adult)  Goal: Absence of Fall  Outcome: Ongoing (interventions implemented as appropriate)   08/17/19 1406   Fall Risk (Adult)   Absence of Fall achieves outcome

## 2019-08-17 NOTE — PROGRESS NOTES
"  Ortho POD 2    Patient Name:  Denisse Flores  YOB: 1960  Medical Records Number:  [id number]    No complaints except pain    /71 (BP Location: Right arm, Patient Position: Sitting)   Pulse 92   Temp 98.3 °F (36.8 °C) (Oral)   Resp 18   Ht 165.1 cm (65\")   Wt 70.5 kg (155 lb 6.8 oz)   SpO2 94%   BMI 25.86 kg/m²     LLE:  NVI, calf nontender, sensation intact  No signs of DVT    Incision: clean, no infection    Lab Results (last 24 hours)     Procedure Component Value Units Date/Time    CBC (No Diff) [[id number]]  (Abnormal) Collected:  08/17/19 0320    Specimen:  Blood Updated:  08/17/19 0351     WBC 11.08 10*3/mm3      RBC 3.76 10*6/mm3      Hemoglobin 12.1 g/dL      Hematocrit 37.2 %      MCV 98.9 fL      MCH 32.2 pg      MCHC 32.5 g/dL      RDW 12.8 %      RDW-SD 46.6 fl      MPV 10.1 fL      Platelets 250 10*3/mm3     Wound Culture - Wound, Knee, Left [[id number]] Collected:  08/15/19 1304    Specimen:  Wound from Knee, Left Updated:  08/16/19 1129     Wound Culture No growth     Gram Stain No WBCs or organisms seen          S/p Left TKA  WBAT with walker  ASA for DVT prophylaxis  I certify that this patient requires inpatient admission for greater than 2 midnights due to fall risk with history of frequent falls  Home with home health today after PT, if comfortable and mobilizing safely    Pedro Priest MD  8/17/2019  "

## 2019-08-18 LAB
BACTERIA SPEC AEROBE CULT: NORMAL
GRAM STN SPEC: NORMAL

## 2019-08-19 NOTE — PAYOR COMM NOTE
"DISCHARGED        Denisse Flores (59 y.o. Female)     Date of Birth Social Security Number Address Home Phone MRN    1960  [id number] [facility] [address] [place] [address] [phone] [id number]    Religious Marital Status          Adventism        Admission Date Admission Type Admitting Provider Attending Provider Department, Room/Bed    8/15/19 Elective Pedro Priest MD  [address], P890/1    Discharge Date Discharge Disposition Discharge Destination        8/17/2019 Home or Self Care              Attending Provider:  (none)   Allergies:  Celecoxib    Isolation:  None   Infection:  None   Code Status:  Prior    Ht:  165.1 cm (65\")   Wt:  70.5 kg (155 lb 6.8 oz)    Admission Cmt:  None   Principal Problem:  None                Active Insurance as of 8/15/2019     Primary Coverage     Payor Plan Insurance Group Employer/Plan Group    [employer] BLUE [employer] BLUE CROSS BLUE SHIELD PPO [id number]     Payor Plan Address Payor Plan Phone Number Payor Plan Fax Number Effective Dates    [address] [phone]  4/1/2012 - None Entered    [facility] [id number]       Subscriber Name Subscriber Birth Date Member ID       KENNEDY FLORES 1960 [id number]                 Emergency Contacts      (Rel.) Home Phone Work Phone Mobile Phone    Kennedy Flores (Spouse) [phone] -- [phone]          "

## 2019-09-06 RX ORDER — ESTRADIOL 1 MG/1
1 TABLET ORAL DAILY
Qty: 30 TABLET | Refills: 5 | Status: SHIPPED | OUTPATIENT
Start: 2019-09-06 | End: 2020-02-19 | Stop reason: SDUPTHER

## 2019-09-06 RX ORDER — ATORVASTATIN CALCIUM 10 MG/1
10 TABLET, FILM COATED ORAL NIGHTLY
Qty: 30 TABLET | Refills: 5 | Status: SHIPPED | OUTPATIENT
Start: 2019-09-06 | End: 2020-02-19 | Stop reason: SDUPTHER

## 2019-09-06 RX ORDER — ESCITALOPRAM OXALATE 10 MG/1
10 TABLET ORAL DAILY
Qty: 30 TABLET | Refills: 5 | Status: SHIPPED | OUTPATIENT
Start: 2019-09-06 | End: 2020-02-19 | Stop reason: SDUPTHER

## 2019-10-16 ENCOUNTER — APPOINTMENT (OUTPATIENT)
Dept: PREADMISSION TESTING | Facility: HOSPITAL | Age: 59
End: 2019-10-16

## 2019-10-18 ENCOUNTER — APPOINTMENT (OUTPATIENT)
Dept: PREADMISSION TESTING | Facility: HOSPITAL | Age: 59
End: 2019-10-18

## 2019-10-18 VITALS
RESPIRATION RATE: 16 BRPM | HEART RATE: 89 BPM | SYSTOLIC BLOOD PRESSURE: 108 MMHG | BODY MASS INDEX: 25.07 KG/M2 | WEIGHT: 156 LBS | HEIGHT: 66 IN | OXYGEN SATURATION: 96 % | TEMPERATURE: 97.2 F | DIASTOLIC BLOOD PRESSURE: 68 MMHG

## 2019-10-18 LAB
ANION GAP SERPL CALCULATED.3IONS-SCNC: 14 MMOL/L (ref 5–15)
BUN BLD-MCNC: 11 MG/DL (ref 6–20)
BUN/CREAT SERPL: 15.5 (ref 7–25)
CALCIUM SPEC-SCNC: 9.1 MG/DL (ref 8.6–10.5)
CHLORIDE SERPL-SCNC: 103 MMOL/L (ref 98–107)
CO2 SERPL-SCNC: 25 MMOL/L (ref 22–29)
CREAT BLD-MCNC: 0.71 MG/DL (ref 0.57–1)
DEPRECATED RDW RBC AUTO: 43.3 FL (ref 37–54)
ERYTHROCYTE [DISTWIDTH] IN BLOOD BY AUTOMATED COUNT: 12.3 % (ref 12.3–15.4)
GFR SERPL CREATININE-BSD FRML MDRD: 85 ML/MIN/1.73
GLUCOSE BLD-MCNC: 110 MG/DL (ref 65–99)
HCT VFR BLD AUTO: 37.4 % (ref 34–46.6)
HGB BLD-MCNC: 12.3 G/DL (ref 12–15.9)
MCH RBC QN AUTO: 31.5 PG (ref 26.6–33)
MCHC RBC AUTO-ENTMCNC: 32.9 G/DL (ref 31.5–35.7)
MCV RBC AUTO: 95.7 FL (ref 79–97)
PLATELET # BLD AUTO: 302 10*3/MM3 (ref 140–450)
PMV BLD AUTO: 9.9 FL (ref 6–12)
POTASSIUM BLD-SCNC: 4.4 MMOL/L (ref 3.5–5.2)
RBC # BLD AUTO: 3.91 10*6/MM3 (ref 3.77–5.28)
SODIUM BLD-SCNC: 142 MMOL/L (ref 136–145)
WBC NRBC COR # BLD: 6.36 10*3/MM3 (ref 3.4–10.8)

## 2019-10-18 PROCEDURE — 85027 COMPLETE CBC AUTOMATED: CPT | Performed by: ORTHOPAEDIC SURGERY

## 2019-10-18 PROCEDURE — 80048 BASIC METABOLIC PNL TOTAL CA: CPT | Performed by: ORTHOPAEDIC SURGERY

## 2019-10-18 PROCEDURE — 36415 COLL VENOUS BLD VENIPUNCTURE: CPT

## 2019-10-18 RX ORDER — DICLOFENAC SODIUM 75 MG/1
75 TABLET, DELAYED RELEASE ORAL 2 TIMES DAILY
COMMUNITY
End: 2021-01-18

## 2019-10-18 NOTE — DISCHARGE INSTRUCTIONS
Take the following medications the morning of surgery with a small sip of water:  NONE    PLEASE ARRIVE AT THE HOSPITAL AT 5:15 AM ON October 22, 2019    General Instructions:  • Do not eat solid food after midnight the night before surgery.  • You may drink clear liquids day of surgery but must stop at least one hour before your hospital arrival time.  • NOTHING TO DRINK AFTER 4:15 AM**  • It is beneficial for you to have a clear drink that contains carbohydrates the day of surgery.  We suggest a 12 to 20 ounce bottle of Gatorade or Powerade for non-diabetic patients or a 12 to 20 ounce bottle of G2 or Powerade Zero for diabetic patients. (Pediatric patients, are not advised to drink a 12 to 20 ounce carbohydrate drink)    Clear liquids are liquids you can see through.  Nothing red in color.     Plain water                               Sports drinks  Sodas                                   Gelatin (Jell-O)  Fruit juices without pulp such as white grape juice and apple juice  Popsicles that contain no fruit or yogurt  Tea or coffee (no cream or milk added)  Gatorade / Powerade  G2 / Powerade Zero    • Infants may have breast milk up to four hours before surgery.  • Infants drinking formula may drink formula up to six hours before surgery.   • Patients who avoid smoking, chewing tobacco and alcohol for 4 weeks prior to surgery have a reduced risk of post-operative complications.  Quit smoking as many days before surgery as you can.  • Do not smoke, use chewing tobacco or drink alcohol the day of surgery.   • If applicable bring your C-PAP/ BI-PAP machine.  • Bring any papers given to you in the doctor’s office.  • Wear clean comfortable clothes.  • Do not wear contact lenses, false eyelashes or make-up.  Bring a case for your glasses.   • Bring crutches or walker if applicable.  • Remove all piercings.  Leave jewelry and any other valuables at home.  • Hair extensions with metal clips must be removed prior to  surgery.  • The Pre-Admission Testing nurse will instruct you to bring medications if unable to obtain an accurate list in Pre-Admission Testing.      Preventing a Surgical Site Infection:  • For 2 to 3 days before surgery, avoid shaving with a razor because the razor can irritate skin and make it easier to develop an infection.    • Any areas of open skin can increase the risk of a post-operative wound infection by allowing bacteria to enter and travel throughout the body.  Notify your surgeon if you have any skin wounds / rashes even if it is not near the expected surgical site.  The area will need assessed to determine if surgery should be delayed until it is healed.  • The night prior to surgery sleep in a clean bed with clean clothing.  Do not allow pets to sleep with you.  • Shower on the morning of surgery using a fresh bar of anti-bacterial soap (such as Dial) and clean washcloth.  Dry with a clean towel and dress in clean clothing.  • Ask your surgeon if you will be receiving antibiotics prior to surgery.  • Make sure you, your family, and all healthcare providers clean their hands with soap and water or an alcohol based hand  before caring for you or your wound.    Day of surgery:  Your arrival time is approximately two hours before your scheduled surgery time.  Upon arrival, a Pre-op nurse and Anesthesiologist will review your health history, obtain vital signs, and answer questions you may have.  The only belongings needed at this time will be a list of your home medications and if applicable your C-PAP/BI-PAP machine.  If you are staying overnight your family can leave the rest of your belongings in the car and bring them to your room later.  A Pre-op nurse will start an IV and you may receive medication in preparation for surgery, including something to help you relax.  Your family will be able to see you in the Pre-op area.  Two visitors at a time will be allowed in the Pre-op room.  While  you are in surgery your family should notify the waiting room  if they leave the waiting room area and provide a contact phone number.    Please be aware that surgery does come with discomfort.  We want to make every effort to control your discomfort so please discuss any uncontrolled symptoms with your nurse.   Your doctor will most likely have prescribed pain medications.      If you are going home after surgery you will receive individualized written care instructions before being discharged.  A responsible adult must drive you to and from the hospital on the day of your surgery and stay with you for 24 hours.    If you are staying overnight following surgery, you will be transported to your hospital room following the recovery period.  [facility] has all private rooms.    You have received a list of surgical assistants for your reference.  If you have any questions please call Pre-Admission Testing at [phone].  Deductibles and co-payments are collected on the day of service. Please be prepared to pay the required co-pay, deductible or deposit on the day of service as defined by your plan.

## 2019-10-22 ENCOUNTER — ANESTHESIA EVENT (OUTPATIENT)
Dept: PERIOP | Facility: HOSPITAL | Age: 59
End: 2019-10-22

## 2019-10-22 ENCOUNTER — ANESTHESIA (OUTPATIENT)
Dept: PERIOP | Facility: HOSPITAL | Age: 59
End: 2019-10-22

## 2019-10-22 ENCOUNTER — HOSPITAL ENCOUNTER (OUTPATIENT)
Facility: HOSPITAL | Age: 59
Setting detail: HOSPITAL OUTPATIENT SURGERY
Discharge: HOME | End: 2019-10-22
Attending: ORTHOPAEDIC SURGERY | Admitting: ORTHOPAEDIC SURGERY

## 2019-10-22 VITALS
HEIGHT: 65 IN | BODY MASS INDEX: 25.47 KG/M2 | TEMPERATURE: 97.9 F | WEIGHT: 152.9 LBS | SYSTOLIC BLOOD PRESSURE: 136 MMHG | RESPIRATION RATE: 16 BRPM | OXYGEN SATURATION: 98 % | DIASTOLIC BLOOD PRESSURE: 82 MMHG | HEART RATE: 81 BPM

## 2019-10-22 PROCEDURE — 25010000002 MIDAZOLAM PER 1 MG: Performed by: ANESTHESIOLOGY

## 2019-10-22 PROCEDURE — 25010000002 FENTANYL CITRATE (PF) 100 MCG/2ML SOLUTION

## 2019-10-22 PROCEDURE — 25010000002 FENTANYL CITRATE (PF) 100 MCG/2ML SOLUTION: Performed by: NURSE ANESTHETIST, CERTIFIED REGISTERED

## 2019-10-22 PROCEDURE — 25010000002 ONDANSETRON PER 1 MG: Performed by: NURSE ANESTHETIST, CERTIFIED REGISTERED

## 2019-10-22 PROCEDURE — 25010000002 PROPOFOL 10 MG/ML EMULSION: Performed by: NURSE ANESTHETIST, CERTIFIED REGISTERED

## 2019-10-22 PROCEDURE — 25010000002 TRIAMCINOLONE PER 10 MG: Performed by: ORTHOPAEDIC SURGERY

## 2019-10-22 PROCEDURE — 25010000002 DEXAMETHASONE PER 1 MG: Performed by: NURSE ANESTHETIST, CERTIFIED REGISTERED

## 2019-10-22 RX ORDER — PROMETHAZINE HYDROCHLORIDE 25 MG/1
25 TABLET ORAL ONCE AS NEEDED
Status: DISCONTINUED | OUTPATIENT
Start: 2019-10-22 | End: 2019-10-22 | Stop reason: HOSPADM

## 2019-10-22 RX ORDER — HYDROCODONE BITARTRATE AND ACETAMINOPHEN 7.5; 325 MG/1; MG/1
TABLET ORAL
Status: COMPLETED
Start: 2019-10-22 | End: 2019-10-22

## 2019-10-22 RX ORDER — DIPHENHYDRAMINE HYDROCHLORIDE 50 MG/ML
12.5 INJECTION INTRAMUSCULAR; INTRAVENOUS
Status: DISCONTINUED | OUTPATIENT
Start: 2019-10-22 | End: 2019-10-22 | Stop reason: HOSPADM

## 2019-10-22 RX ORDER — BUPIVACAINE HYDROCHLORIDE AND EPINEPHRINE 5; 5 MG/ML; UG/ML
INJECTION, SOLUTION EPIDURAL; INTRACAUDAL; PERINEURAL AS NEEDED
Status: DISCONTINUED | OUTPATIENT
Start: 2019-10-22 | End: 2019-10-22 | Stop reason: HOSPADM

## 2019-10-22 RX ORDER — TRIAMCINOLONE ACETONIDE 40 MG/ML
INJECTION, SUSPENSION INTRA-ARTICULAR; INTRAMUSCULAR AS NEEDED
Status: DISCONTINUED | OUTPATIENT
Start: 2019-10-22 | End: 2019-10-22 | Stop reason: HOSPADM

## 2019-10-22 RX ORDER — ONDANSETRON 2 MG/ML
4 INJECTION INTRAMUSCULAR; INTRAVENOUS ONCE AS NEEDED
Status: DISCONTINUED | OUTPATIENT
Start: 2019-10-22 | End: 2019-10-22 | Stop reason: HOSPADM

## 2019-10-22 RX ORDER — FAMOTIDINE 10 MG/ML
20 INJECTION, SOLUTION INTRAVENOUS ONCE
Status: COMPLETED | OUTPATIENT
Start: 2019-10-22 | End: 2019-10-22

## 2019-10-22 RX ORDER — DEXAMETHASONE SODIUM PHOSPHATE 10 MG/ML
INJECTION INTRAMUSCULAR; INTRAVENOUS AS NEEDED
Status: DISCONTINUED | OUTPATIENT
Start: 2019-10-22 | End: 2019-10-22 | Stop reason: SURG

## 2019-10-22 RX ORDER — FENTANYL CITRATE 50 UG/ML
INJECTION, SOLUTION INTRAMUSCULAR; INTRAVENOUS
Status: COMPLETED
Start: 2019-10-22 | End: 2019-10-22

## 2019-10-22 RX ORDER — FENTANYL CITRATE 50 UG/ML
50 INJECTION, SOLUTION INTRAMUSCULAR; INTRAVENOUS
Status: DISCONTINUED | OUTPATIENT
Start: 2019-10-22 | End: 2019-10-22 | Stop reason: HOSPADM

## 2019-10-22 RX ORDER — PROMETHAZINE HYDROCHLORIDE 25 MG/ML
12.5 INJECTION, SOLUTION INTRAMUSCULAR; INTRAVENOUS ONCE AS NEEDED
Status: DISCONTINUED | OUTPATIENT
Start: 2019-10-22 | End: 2019-10-22 | Stop reason: HOSPADM

## 2019-10-22 RX ORDER — FAMOTIDINE 10 MG/ML
20 INJECTION, SOLUTION INTRAVENOUS ONCE
Status: DISCONTINUED | OUTPATIENT
Start: 2019-10-22 | End: 2019-10-22 | Stop reason: HOSPADM

## 2019-10-22 RX ORDER — FLUMAZENIL 0.1 MG/ML
0.2 INJECTION INTRAVENOUS AS NEEDED
Status: DISCONTINUED | OUTPATIENT
Start: 2019-10-22 | End: 2019-10-22 | Stop reason: HOSPADM

## 2019-10-22 RX ORDER — PROPOFOL 10 MG/ML
VIAL (ML) INTRAVENOUS AS NEEDED
Status: DISCONTINUED | OUTPATIENT
Start: 2019-10-22 | End: 2019-10-22 | Stop reason: SURG

## 2019-10-22 RX ORDER — SODIUM CHLORIDE, SODIUM LACTATE, POTASSIUM CHLORIDE, CALCIUM CHLORIDE 600; 310; 30; 20 MG/100ML; MG/100ML; MG/100ML; MG/100ML
9 INJECTION, SOLUTION INTRAVENOUS CONTINUOUS
Status: DISCONTINUED | OUTPATIENT
Start: 2019-10-22 | End: 2019-10-22 | Stop reason: HOSPADM

## 2019-10-22 RX ORDER — NALOXONE HCL 0.4 MG/ML
0.2 VIAL (ML) INJECTION AS NEEDED
Status: DISCONTINUED | OUTPATIENT
Start: 2019-10-22 | End: 2019-10-22 | Stop reason: HOSPADM

## 2019-10-22 RX ORDER — EPHEDRINE SULFATE 50 MG/ML
5 INJECTION, SOLUTION INTRAVENOUS ONCE AS NEEDED
Status: DISCONTINUED | OUTPATIENT
Start: 2019-10-22 | End: 2019-10-22 | Stop reason: HOSPADM

## 2019-10-22 RX ORDER — SODIUM CHLORIDE 0.9 % (FLUSH) 0.9 %
3 SYRINGE (ML) INJECTION EVERY 12 HOURS SCHEDULED
Status: DISCONTINUED | OUTPATIENT
Start: 2019-10-22 | End: 2019-10-22 | Stop reason: HOSPADM

## 2019-10-22 RX ORDER — ACETAMINOPHEN 325 MG/1
650 TABLET ORAL ONCE AS NEEDED
Status: DISCONTINUED | OUTPATIENT
Start: 2019-10-22 | End: 2019-10-22 | Stop reason: HOSPADM

## 2019-10-22 RX ORDER — FENTANYL CITRATE 50 UG/ML
INJECTION, SOLUTION INTRAMUSCULAR; INTRAVENOUS AS NEEDED
Status: DISCONTINUED | OUTPATIENT
Start: 2019-10-22 | End: 2019-10-22 | Stop reason: SURG

## 2019-10-22 RX ORDER — LIDOCAINE HYDROCHLORIDE 10 MG/ML
0.5 INJECTION, SOLUTION EPIDURAL; INFILTRATION; INTRACAUDAL; PERINEURAL ONCE AS NEEDED
Status: DISCONTINUED | OUTPATIENT
Start: 2019-10-22 | End: 2019-10-22 | Stop reason: HOSPADM

## 2019-10-22 RX ORDER — ONDANSETRON 2 MG/ML
INJECTION INTRAMUSCULAR; INTRAVENOUS AS NEEDED
Status: DISCONTINUED | OUTPATIENT
Start: 2019-10-22 | End: 2019-10-22 | Stop reason: SURG

## 2019-10-22 RX ORDER — OXYCODONE AND ACETAMINOPHEN 10; 325 MG/1; MG/1
1 TABLET ORAL EVERY 4 HOURS PRN
Qty: 60 TABLET | Refills: 0 | Status: SHIPPED | OUTPATIENT
Start: 2019-10-22 | End: 2020-09-30

## 2019-10-22 RX ORDER — SODIUM CHLORIDE 0.9 % (FLUSH) 0.9 %
3-10 SYRINGE (ML) INJECTION AS NEEDED
Status: DISCONTINUED | OUTPATIENT
Start: 2019-10-22 | End: 2019-10-22 | Stop reason: HOSPADM

## 2019-10-22 RX ORDER — MIDAZOLAM HYDROCHLORIDE 1 MG/ML
1 INJECTION INTRAMUSCULAR; INTRAVENOUS
Status: DISCONTINUED | OUTPATIENT
Start: 2019-10-22 | End: 2019-10-22 | Stop reason: HOSPADM

## 2019-10-22 RX ORDER — PROMETHAZINE HYDROCHLORIDE 25 MG/ML
6.25 INJECTION, SOLUTION INTRAMUSCULAR; INTRAVENOUS
Status: DISCONTINUED | OUTPATIENT
Start: 2019-10-22 | End: 2019-10-22 | Stop reason: HOSPADM

## 2019-10-22 RX ORDER — MIDAZOLAM HYDROCHLORIDE 1 MG/ML
2 INJECTION INTRAMUSCULAR; INTRAVENOUS
Status: DISCONTINUED | OUTPATIENT
Start: 2019-10-22 | End: 2019-10-22 | Stop reason: HOSPADM

## 2019-10-22 RX ORDER — HYDROCODONE BITARTRATE AND ACETAMINOPHEN 7.5; 325 MG/1; MG/1
1 TABLET ORAL ONCE AS NEEDED
Status: COMPLETED | OUTPATIENT
Start: 2019-10-22 | End: 2019-10-22

## 2019-10-22 RX ORDER — OXYCODONE AND ACETAMINOPHEN 7.5; 325 MG/1; MG/1
1 TABLET ORAL ONCE AS NEEDED
Status: DISCONTINUED | OUTPATIENT
Start: 2019-10-22 | End: 2019-10-22 | Stop reason: HOSPADM

## 2019-10-22 RX ORDER — PROMETHAZINE HYDROCHLORIDE 25 MG/1
25 SUPPOSITORY RECTAL ONCE AS NEEDED
Status: DISCONTINUED | OUTPATIENT
Start: 2019-10-22 | End: 2019-10-22 | Stop reason: HOSPADM

## 2019-10-22 RX ORDER — HYDROMORPHONE HYDROCHLORIDE 1 MG/ML
0.5 INJECTION, SOLUTION INTRAMUSCULAR; INTRAVENOUS; SUBCUTANEOUS
Status: DISCONTINUED | OUTPATIENT
Start: 2019-10-22 | End: 2019-10-22 | Stop reason: HOSPADM

## 2019-10-22 RX ORDER — DIPHENHYDRAMINE HCL 25 MG
25 CAPSULE ORAL
Status: DISCONTINUED | OUTPATIENT
Start: 2019-10-22 | End: 2019-10-22 | Stop reason: HOSPADM

## 2019-10-22 RX ORDER — LABETALOL HYDROCHLORIDE 5 MG/ML
5 INJECTION, SOLUTION INTRAVENOUS
Status: DISCONTINUED | OUTPATIENT
Start: 2019-10-22 | End: 2019-10-22 | Stop reason: HOSPADM

## 2019-10-22 RX ORDER — HYDRALAZINE HYDROCHLORIDE 20 MG/ML
5 INJECTION INTRAMUSCULAR; INTRAVENOUS
Status: DISCONTINUED | OUTPATIENT
Start: 2019-10-22 | End: 2019-10-22 | Stop reason: HOSPADM

## 2019-10-22 RX ORDER — MIDAZOLAM HYDROCHLORIDE 1 MG/ML
0.5 INJECTION INTRAMUSCULAR; INTRAVENOUS
Status: DISCONTINUED | OUTPATIENT
Start: 2019-10-22 | End: 2019-10-22 | Stop reason: HOSPADM

## 2019-10-22 RX ADMIN — FENTANYL CITRATE 50 MCG: 50 INJECTION, SOLUTION INTRAMUSCULAR; INTRAVENOUS at 07:32

## 2019-10-22 RX ADMIN — FENTANYL CITRATE 50 MCG: 50 INJECTION INTRAMUSCULAR; INTRAVENOUS at 07:00

## 2019-10-22 RX ADMIN — FAMOTIDINE 20 MG: 10 INJECTION INTRAVENOUS at 06:37

## 2019-10-22 RX ADMIN — FENTANYL CITRATE 50 MCG: 50 INJECTION, SOLUTION INTRAMUSCULAR; INTRAVENOUS at 07:44

## 2019-10-22 RX ADMIN — PROPOFOL 150 MG: 10 INJECTION, EMULSION INTRAVENOUS at 06:58

## 2019-10-22 RX ADMIN — MIDAZOLAM 0.5 MG: 1 INJECTION INTRAMUSCULAR; INTRAVENOUS at 06:37

## 2019-10-22 RX ADMIN — DEXAMETHASONE SODIUM PHOSPHATE 8 MG: 10 INJECTION INTRAMUSCULAR; INTRAVENOUS at 07:03

## 2019-10-22 RX ADMIN — SODIUM CHLORIDE, POTASSIUM CHLORIDE, SODIUM LACTATE AND CALCIUM CHLORIDE 9 ML/HR: 600; 310; 30; 20 INJECTION, SOLUTION INTRAVENOUS at 05:45

## 2019-10-22 RX ADMIN — HYDROCODONE BITARTRATE AND ACETAMINOPHEN 1 TABLET: 7.5; 325 TABLET ORAL at 07:31

## 2019-10-22 RX ADMIN — SODIUM CHLORIDE, POTASSIUM CHLORIDE, SODIUM LACTATE AND CALCIUM CHLORIDE: 600; 310; 30; 20 INJECTION, SOLUTION INTRAVENOUS at 06:58

## 2019-10-22 RX ADMIN — ONDANSETRON 4 MG: 2 INJECTION INTRAMUSCULAR; INTRAVENOUS at 07:03

## 2019-10-22 NOTE — H&P
"  Orthopaedic Surgery History and Physical    Patient Name:  Denisse Flores  YOB: 1960   Age: 59 y.o.  Medical Records Number:  [id number]    Date of Admission:  10/22/2019  5:22 AM    Chief Complaint:  MANIPULATION OF KNEE    Denisse Flores is a 59 y.o. female who presents c/o severe left knee pain and stiffness.  The pain has been on and off for many years, worsening to the point where the pain is becoming disabling.  The pain is a constant dull ache with occasional sharp, stabbing pain.  She had a left tka revision 2 months ago and has progressive stiffness despite physical therapy.  The patient has failed conservative treatment and would like to proceed with left total knee arthroplasty manipulatio.    /73 (BP Location: Left arm, Patient Position: Lying)   Pulse 79   Temp 99.1 °F (37.3 °C) (Oral)   Resp 16   Ht 165.1 cm (65\")   Wt 69.4 kg (152 lb 14.4 oz)   SpO2 98%   BMI 25.44 kg/m²     Past Medical History:    Past Medical History:   Diagnosis Date   • Anxiety    • Arthritis    • Cancer (CMS/HCC)    • Environmental allergies    • High cholesterol    • History of panic attacks     POST OP W/ LAST KNEE SURGERY   • History of skin cancer 1970'S   • History of transfusion    • Insomnia    • Knee pain    • Migraine    • Vitamin D deficiency        Social History:    Social History     Socioeconomic History   • Marital status:      Spouse name: Not on file   • Number of children: Not on file   • Years of education: Not on file   • Highest education level: Not on file   Tobacco Use   • Smoking status: Never Smoker   • Smokeless tobacco: Never Used   Substance and Sexual Activity   • Alcohol use: Yes     Comment: RARELY   • Drug use: No   • Sexual activity: Defer   Social History Narrative           Family History:    Family History   Problem Relation Age of Onset   • Malig Hyperthermia Neg Hx        Current Medications:    Current Facility-Administered Medications:   •  " famotidine (PEPCID) injection 20 mg, 20 mg, Intravenous, Once, Efrain Cadena MD  •  fentaNYL citrate (PF) (SUBLIMAZE) injection 50 mcg, 50 mcg, Intravenous, Q10 Min PRN, Sagrario Wynne MD  •  fentaNYL citrate (PF) (SUBLIMAZE) injection 50 mcg, 50 mcg, Intravenous, Q10 Min PRN, Efrain Cadena MD  •  lactated ringers infusion, 9 mL/hr, Intravenous, Continuous, Sagrario Wynne MD, Last Rate: 9 mL/hr at 10/22/19 0545, 9 mL/hr at 10/22/19 0545  •  lactated ringers infusion, 9 mL/hr, Intravenous, Continuous, Efrain Cadena MD  •  lidocaine PF 1% (XYLOCAINE) injection 0.5 mL, 0.5 mL, Injection, Once PRN, Sagrario Wynne MD  •  lidocaine PF 1% (XYLOCAINE) injection 0.5 mL, 0.5 mL, Injection, Once PRN, Efrain Cadena MD  •  midazolam (VERSED) injection 0.5 mg, 0.5 mg, Intravenous, Q5 Min PRN, 0.5 mg at 10/22/19 0637 **OR** midazolam (VERSED) injection 1 mg, 1 mg, Intravenous, Q5 Min PRN, Sagrario Wynne MD  •  midazolam (VERSED) injection 1 mg, 1 mg, Intravenous, Q5 Min PRN **OR** midazolam (VERSED) injection 2 mg, 2 mg, Intravenous, Q5 Min PRN, Efrain Cadena MD  •  sodium chloride 0.9 % flush 3 mL, 3 mL, Intravenous, Q12H, Sagrario Wynne MD  •  sodium chloride 0.9 % flush 3 mL, 3 mL, Intravenous, Q12H, Efrain Cadena MD  •  sodium chloride 0.9 % flush 3-10 mL, 3-10 mL, Intravenous, PRN, Sagrario Wynne MD  •  sodium chloride 0.9 % flush 3-10 mL, 3-10 mL, Intravenous, PRN, Efrain Cadena MD    Allergies:    Allergies   Allergen Reactions   • Celecoxib Swelling     FACIAL SWELLING       Review of Systems:   HEENT: Patient denies any headaches, vision changes, change in hearing, or tinnitus, Patient denies any rhinorrhea,epistaxis, sinus pain, mouth or dental problems, sore throat or hoarseness, or dysphagia  Pulmonary: Patient denies any cough, congestion, SOA, or wheezing  Cardiovascular: Patient denies any chest pain, dyspnea, palpitations, weakness, intolerance  of exercise, varicosities, swelling of extremities, known murmur  Gastrointestinal:  Patient denies nausea, vomiting, diarrhea, constipation, loss  of appetite, change in appetite, dysphagia, gas, heartburn, melena, change in bowel habits, use of laxatives or other drugs to alter the function of the gastrointestinal tract.  Genital/Urinary: Patient denies dysuria, change in color of urine, change in frequency of urination, pain with urgency, incontinence, retention, or nocturia.  Musculoskeletal: Patient denies increased warmth; redness; or swelling of joints; limitation of function; deformity; crepitation: pain in a joint or an extremity, the neck, or the back, especially with movement.  Neurological: Patient denies dizziness, tremor, ataxia, difficulty in speaking, change in speech, paresthesia, loss of sensation, seizures, syncope, changes in memory.  Endocrine system: Patient denies tremors, palpitations, intolerance of heat or cold, polyuria, polydipsia, polyphagia, diaphoresis, exophthalmos, or goiter.  Psychological: Patient denies thoughts/plans or harming self or other; depression,  insomnia, night terrors, pasha, memory loss, disorientation.  Skin: Patient denies any bruising, rashes, discoloration, pruritus, wounds, ulcers, decubiti, changes in the hair or nails  Hematopoietic: Patient denies history of spontaneous or excessive bleeding, epistaxis, hematuria, melena, fatigue, enlarged or tender lymph nodes, pallor, history of anemia.        Physical Exam:   Awake, A&O x3, affect normal, no acute distress  Ambulating with a limp due to knee pain  Knee ROM is limited due to pain (5-85)  Strength is 4/5 in the quad, hamstring and calf  Cap refill is normal, Sensation intact    Card:  RR, HD Stable  Pulm:  Regular breathing, no S.O.A  Abd:  Soft, NT, ND    Lab Results (last 24 hours)     Procedure Component Value Units Date/Time    SCANNED - LABS [[id number]] Resulted:  10/22/19      Updated:  10/21/19 1546           No results found.      Assessment:  Arthrofibrosis Left Total Knee Arthroplasty    Plan:  Patient's pain is becoming disabling, despite extensive conservative treatment.  Radiographs reveal good implant position and alignment, no signs of infection.  The risks of surgery, including, but not limited to, heart attack, stroke, dying, DVT, nerve injury, vascular injury, arthrofibrosis and infection were discussed.  The alternatives and benefits were also discussed.  All questions answered and the patient wishes to proceed with left total knee arthroplasty manipulation.    Pedro Rivas PA-C  [facility]  [address] [place] [address]  [phone]    10/22/2019    CC: Johanna Anglin APRN, Pedro Priest MD

## 2019-10-22 NOTE — ANESTHESIA POSTPROCEDURE EVALUATION
Patient: Denisse Flores    Procedure Summary     Date:  10/22/19 Room / Location:  [facility] OR [address] [place] [address] MAIN OR    Anesthesia Start:  0653 Anesthesia Stop:  0713    Procedure:  MANIPULATION OF KNEE with steriod injection (Left ) Diagnosis:      Surgeon:  Pedro Priest MD Provider:  Sagrario Wynne MD    Anesthesia Type:  general ASA Status:  2          Anesthesia Type: general  Last vitals  BP   136/82 (10/22/19 0825)   Temp   36.6 °C (97.9 °F) (10/22/19 0710)   Pulse   81 (10/22/19 0825)   Resp   16 (10/22/19 0825)     SpO2   98 % (10/22/19 0825)     Post Anesthesia Care and Evaluation    Patient location during evaluation: PHASE II  Patient participation: complete - patient participated  Level of consciousness: awake and alert  Pain management: adequate  Airway patency: patent  Anesthetic complications: No anesthetic complications  PONV Status: none  Cardiovascular status: acceptable  Respiratory status: acceptable  Hydration status: acceptable

## 2019-10-22 NOTE — ANESTHESIA PROCEDURE NOTES
Airway  Urgency: elective    Date/Time: 10/22/2019 6:58 AM  Airway not difficult    General Information and Staff    Patient location during procedure: OR  Anesthesiologist: Sagrario Wynne MD  CRNA: Kaila Wilkes CRNA    Indications and Patient Condition  Indications for airway management: airway protection    Preoxygenated: yes  MILS not maintained throughout  Mask difficulty assessment: 1 - vent by mask    Final Airway Details  Final airway type: supraglottic airway      Successful airway: classic  Size 4    Number of attempts at approach: 1    Additional Comments  Preoxygenation FEO2 >85, SIVI, LMA placed with ease, teeth/lips as preop. Secured and placement confirmed.

## 2019-10-22 NOTE — OP NOTE
Orthopaedic Surgery Operative Note    Patient Name:  Denisse Flores  YOB: 1960  Age: 59 y.o.  Medical Records Number:  [id number]    Date of Procedure:  10/22/2019    Pre-operative Diagnosis:  Left Total Knee Arthrofibrosis    Post-operative Diagnosis:  Left Total Knee Arthrofibrosis    Procedure Performed:  Left Total Knee Arthroplasty Manipulation    Surgeon:  Pedro Priest M.D.    Anesthetic Type:  General    Estimated Blood Loss:  0cc's    Specimens: * No orders in the log *    No Complications      Indications for Procedure:  Denisse Flores is a 59 y.o. female suffering from stiffness and pain following her left tka revision 2 months ago.  The patients pain is becoming disabling, despite extensive conservative care, including NSAIDS, therapy and injections. The risks, benefits and alternatives were discussed and the patient wishes to proceed with left total knee arthroplasty manipulation.      Procedure Performed:    After informed consent was obtained the patient was taken to the operating room and placed supine on the operating table.  After general anesthesia induced we manipulated the left knee.  We were able to obtain 125 degrees of flexion and full extension.  There were significant adhesions at 95 degrees of flexion.  We mobilized the patella and then injected 40 mg of Kenalog and 30 cc's 0.5% Marcaine with epinephrine.  Patient awakened from general anesthesia and taken to the recovery room in stable condition.      Pedro Rivas PA-C  [facility]  [address] [place], [place] [id number]  [phone]    10/22/2019

## 2019-10-22 NOTE — ANESTHESIA PREPROCEDURE EVALUATION
Anesthesia Evaluation     Patient summary reviewed and Nursing notes reviewed   no history of anesthetic complications:  NPO Solid Status: > 8 hours  NPO Liquid Status: > 2 hours           Airway   Mallampati: II  TM distance: >3 FB  Neck ROM: full  No difficulty expected  Dental - normal exam     Pulmonary - normal exam   (+) shortness of breath,   Cardiovascular - normal exam  Exercise tolerance: good (4-7 METS)    ECG reviewed    (+) hyperlipidemia,   (-) angina, orthopnea, PND, TSANG      Neuro/Psych  (+) headaches, psychiatric history Anxiety,     GI/Hepatic/Renal/Endo      Musculoskeletal     Abdominal  - normal exam   Substance History      OB/GYN          Other   (+) arthritis                     Anesthesia Plan    ASA 2     general     intravenous induction   Anesthetic plan, all risks, benefits, and alternatives have been provided, discussed and informed consent has been obtained with: patient.    Plan discussed with CRNA and attending.

## 2019-12-04 RX ORDER — AMITRIPTYLINE HYDROCHLORIDE 10 MG/1
TABLET, FILM COATED ORAL
Qty: 270 TABLET | Refills: 0 | Status: SHIPPED | OUTPATIENT
Start: 2019-12-04 | End: 2020-03-23 | Stop reason: SDUPTHER

## 2020-02-19 ENCOUNTER — OFFICE VISIT (OUTPATIENT)
Dept: FAMILY MEDICINE CLINIC | Facility: CLINIC | Age: 60
End: 2020-02-19

## 2020-02-19 VITALS
WEIGHT: 158.5 LBS | HEART RATE: 87 BPM | HEIGHT: 65 IN | RESPIRATION RATE: 16 BRPM | DIASTOLIC BLOOD PRESSURE: 78 MMHG | SYSTOLIC BLOOD PRESSURE: 112 MMHG | TEMPERATURE: 97.6 F | OXYGEN SATURATION: 98 % | BODY MASS INDEX: 26.41 KG/M2

## 2020-02-19 DIAGNOSIS — E55.9 VITAMIN D DEFICIENCY: ICD-10-CM

## 2020-02-19 DIAGNOSIS — G47.00 INSOMNIA, UNSPECIFIED TYPE: ICD-10-CM

## 2020-02-19 DIAGNOSIS — E78.5 HYPERLIPIDEMIA, UNSPECIFIED HYPERLIPIDEMIA TYPE: Primary | ICD-10-CM

## 2020-02-19 DIAGNOSIS — Z79.899 MEDICATION MANAGEMENT: ICD-10-CM

## 2020-02-19 DIAGNOSIS — R30.0 DYSURIA: ICD-10-CM

## 2020-02-19 LAB
BILIRUB BLD-MCNC: NEGATIVE MG/DL
CLARITY, POC: CLEAR
COLOR UR: YELLOW
GLUCOSE UR STRIP-MCNC: NEGATIVE MG/DL
KETONES UR QL: NEGATIVE
LEUKOCYTE EST, POC: ABNORMAL
NITRITE UR-MCNC: POSITIVE MG/ML
PH UR: 5.5 [PH] (ref 5–8)
PROT UR STRIP-MCNC: NEGATIVE MG/DL
RBC # UR STRIP: NEGATIVE /UL
SP GR UR: 1.02 (ref 1–1.03)
UROBILINOGEN UR QL: NORMAL

## 2020-02-19 PROCEDURE — 99214 OFFICE O/P EST MOD 30 MIN: CPT | Performed by: NURSE PRACTITIONER

## 2020-02-19 PROCEDURE — 81003 URINALYSIS AUTO W/O SCOPE: CPT | Performed by: NURSE PRACTITIONER

## 2020-02-19 RX ORDER — PHENAZOPYRIDINE HYDROCHLORIDE 200 MG/1
200 TABLET, FILM COATED ORAL 3 TIMES DAILY PRN
Qty: 6 TABLET | Refills: 0 | Status: SHIPPED | OUTPATIENT
Start: 2020-02-19 | End: 2020-09-30

## 2020-02-19 RX ORDER — ESTRADIOL 1 MG/1
1 TABLET ORAL DAILY
Qty: 90 TABLET | Refills: 1 | Status: SHIPPED | OUTPATIENT
Start: 2020-02-19 | End: 2020-08-11 | Stop reason: SDUPTHER

## 2020-02-19 RX ORDER — ESCITALOPRAM OXALATE 10 MG/1
10 TABLET ORAL DAILY
Qty: 90 TABLET | Refills: 1 | Status: SHIPPED | OUTPATIENT
Start: 2020-02-19 | End: 2020-08-11 | Stop reason: SDUPTHER

## 2020-02-19 RX ORDER — NITROFURANTOIN 25; 75 MG/1; MG/1
100 CAPSULE ORAL 2 TIMES DAILY
Qty: 14 CAPSULE | Refills: 0 | Status: SHIPPED | OUTPATIENT
Start: 2020-02-19 | End: 2020-09-30

## 2020-02-19 RX ORDER — ATORVASTATIN CALCIUM 10 MG/1
10 TABLET, FILM COATED ORAL NIGHTLY
Qty: 90 TABLET | Refills: 1 | Status: SHIPPED | OUTPATIENT
Start: 2020-02-19 | End: 2020-08-11 | Stop reason: SDUPTHER

## 2020-02-19 NOTE — PROGRESS NOTES
"Gael Floers is a 59 y.o. female   who presents for   Chief Complaint   Patient presents with   • Follow-up     labs   • Earache     right ear   • Cystitis     since saturday     Dysuria, in past few days, tried otc azo with some relief, increased cranberry,     Insomnia, on elavil, previously tried belsomra, ambien, declines trazodone  Vivid dreams with most medications    Recent purchased a home, moving in a few weeks    Ear pain/itching, right ear over past week or so, not severe but present    /78   Pulse 87   Temp 97.6 °F (36.4 °C)   Resp 16   Ht 165.1 cm (65\")   Wt 71.9 kg (158 lb 8 oz)   SpO2 98%   BMI 26.38 kg/m²       History of Present Illness   Earache    There is pain in the right ear. This is a new problem. Episode onset: x 10 days. The problem occurs every few minutes. The problem has been waxing and waning. There has been no fever. Pertinent negatives include no abdominal pain, coughing, diarrhea, ear discharge, headaches, hearing loss, neck pain, rash, rhinorrhea, sore throat or vomiting. Associated symptoms comments: itching. She has tried nothing for the symptoms. The treatment provided no relief. There is no history of a chronic ear infection, hearing loss or a tympanostomy tube.   Cystitis   This is a new problem. The current episode started in the past 7 days. The problem has been unchanged. Associated symptoms include arthralgias (chronic). Pertinent negatives include no abdominal pain, anorexia, change in bowel habit, chest pain, chills, congestion, coughing, diaphoresis, fatigue, fever, headaches, joint swelling, myalgias, nausea, neck pain, numbness, rash, sore throat, swollen glands, urinary symptoms, vertigo, visual change, vomiting or weakness. Nothing aggravates the symptoms. Treatments tried: azo, cranberry, fluids. The treatment provided no relief.       The following portions of the patient's history were reviewed and updated as appropriate: allergies, " current medications, past family history, past medical history, past social history, past surgical history and problem list.    Review of Systems  Review of Systems   Constitutional: Negative for chills, diaphoresis, fatigue and fever.   HENT: Positive for ear pain. Negative for congestion, ear discharge, hearing loss, rhinorrhea and sore throat.    Respiratory: Negative for cough.    Cardiovascular: Negative for chest pain.   Gastrointestinal: Negative for abdominal pain, anorexia, change in bowel habit, diarrhea, nausea and vomiting.   Musculoskeletal: Positive for arthralgias (chronic). Negative for joint swelling, myalgias and neck pain.   Skin: Negative for rash.   Neurological: Negative for vertigo, weakness, numbness and headaches.       Objective   Physical Exam  Physical Exam   Constitutional: She is oriented to person, place, and time. She appears well-developed and well-nourished. No distress.   HENT:   Head: Normocephalic and atraumatic.   Right Ear: Tympanic membrane and external ear normal.   Left Ear: Tympanic membrane, external ear and ear canal normal.   Nose: Nose normal.   Mouth/Throat: Uvula is midline, oropharynx is clear and moist and mucous membranes are normal. No oropharyngeal exudate.   Canal scaly, no edema, no pain with manipulation of tragus or pinna, minimal erythema   Neck: Neck supple.   Cardiovascular: Normal rate, regular rhythm and normal heart sounds. Exam reveals no gallop and no friction rub.   No murmur heard.  Pulmonary/Chest: Effort normal and breath sounds normal. No respiratory distress. She has no wheezes. She has no rales.   Abdominal: Soft. Bowel sounds are normal. She exhibits no distension. There is no tenderness.   Neurological: She is alert and oriented to person, place, and time.   Skin: Skin is warm and dry. She is not diaphoretic.   Psychiatric: She has a normal mood and affect.   Vitals reviewed.        Assessment/Plan   Denisse was seen today for follow-up,  earache and cystitis.    Diagnoses and all orders for this visit:    Hyperlipidemia, unspecified hyperlipidemia type  -     Lipid Panel With LDL / HDL Ratio    Dysuria  -     POC Urinalysis Dipstick, Automated  -     Urine Culture - Urine, Urine, Clean Catch  -     CBC & Differential    Vitamin D deficiency  -     Vitamin D 25 hydroxy    Insomnia, unspecified type    Medication management  -     CBC & Differential  -     Comprehensive metabolic panel    Other orders  -     escitalopram (LEXAPRO) 10 MG tablet; Take 1 tablet by mouth Daily.  -     atorvastatin (LIPITOR) 10 MG tablet; Take 1 tablet by mouth Every Night.  -     estradiol (ESTRACE) 1 MG tablet; Take 1 tablet by mouth Daily.  -     nitrofurantoin, macrocrystal-monohydrate, (MACROBID) 100 MG capsule; Take 1 capsule by mouth 2 (Two) Times a Day.  -     phenazopyridine (PYRIDIUM) 200 MG tablet; Take 1 tablet by mouth 3 (Three) Times a Day As Needed for Bladder Spasms.    recommend otc hydrocortisone to right ear as directed  Continue current medications  macrobid and pyridium for UTI, will obtain culture  On elavil and super snooze, benefits waxing and waning, will continue same medication  Discussed potential side effects of ambien again and not interested in resuming medication  Follow up in six months      Answers for HPI/ROS submitted by the patient on 2/18/2020   What is the primary reason for your visit?: Other  Please describe your symptoms.: wellness check for cholesterol  Have you had these symptoms before?: Yes  How long have you been having these symptoms?: Other  Please list any medications you are currently taking for this condition.: atorvastatin

## 2020-02-20 LAB
25(OH)D3+25(OH)D2 SERPL-MCNC: 25.6 NG/ML (ref 30–100)
ALBUMIN SERPL-MCNC: 4.2 G/DL (ref 3.5–5.2)
ALBUMIN/GLOB SERPL: 1.6 G/DL
ALP SERPL-CCNC: 62 U/L (ref 39–117)
ALT SERPL-CCNC: 23 U/L (ref 1–33)
AST SERPL-CCNC: 16 U/L (ref 1–32)
BASOPHILS # BLD AUTO: 0.09 10*3/MM3 (ref 0–0.2)
BASOPHILS NFR BLD AUTO: 1.1 % (ref 0–1.5)
BILIRUB SERPL-MCNC: 0.6 MG/DL (ref 0.2–1.2)
BUN SERPL-MCNC: 15 MG/DL (ref 6–20)
BUN/CREAT SERPL: 17.6 (ref 7–25)
CALCIUM SERPL-MCNC: 9.2 MG/DL (ref 8.6–10.5)
CHLORIDE SERPL-SCNC: 102 MMOL/L (ref 98–107)
CHOLEST SERPL-MCNC: 205 MG/DL (ref 0–200)
CO2 SERPL-SCNC: 25.9 MMOL/L (ref 22–29)
CREAT SERPL-MCNC: 0.85 MG/DL (ref 0.57–1)
EOSINOPHIL # BLD AUTO: 0.4 10*3/MM3 (ref 0–0.4)
EOSINOPHIL NFR BLD AUTO: 4.8 % (ref 0.3–6.2)
ERYTHROCYTE [DISTWIDTH] IN BLOOD BY AUTOMATED COUNT: 12.3 % (ref 12.3–15.4)
GLOBULIN SER CALC-MCNC: 2.6 GM/DL
GLUCOSE SERPL-MCNC: 76 MG/DL (ref 65–99)
HCT VFR BLD AUTO: 38.7 % (ref 34–46.6)
HDLC SERPL-MCNC: 71 MG/DL (ref 40–60)
HGB BLD-MCNC: 13.1 G/DL (ref 12–15.9)
IMM GRANULOCYTES # BLD AUTO: 0.03 10*3/MM3 (ref 0–0.05)
IMM GRANULOCYTES NFR BLD AUTO: 0.4 % (ref 0–0.5)
LDLC SERPL CALC-MCNC: 100 MG/DL (ref 0–100)
LDLC/HDLC SERPL: 1.41 {RATIO}
LYMPHOCYTES # BLD AUTO: 3.52 10*3/MM3 (ref 0.7–3.1)
LYMPHOCYTES NFR BLD AUTO: 42.7 % (ref 19.6–45.3)
MCH RBC QN AUTO: 32.4 PG (ref 26.6–33)
MCHC RBC AUTO-ENTMCNC: 33.9 G/DL (ref 31.5–35.7)
MCV RBC AUTO: 95.8 FL (ref 79–97)
MONOCYTES # BLD AUTO: 0.87 10*3/MM3 (ref 0.1–0.9)
MONOCYTES NFR BLD AUTO: 10.5 % (ref 5–12)
NEUTROPHILS # BLD AUTO: 3.34 10*3/MM3 (ref 1.7–7)
NEUTROPHILS NFR BLD AUTO: 40.5 % (ref 42.7–76)
NRBC BLD AUTO-RTO: 0 /100 WBC (ref 0–0.2)
PLATELET # BLD AUTO: 308 10*3/MM3 (ref 140–450)
POTASSIUM SERPL-SCNC: 4.1 MMOL/L (ref 3.5–5.2)
PROT SERPL-MCNC: 6.8 G/DL (ref 6–8.5)
RBC # BLD AUTO: 4.04 10*6/MM3 (ref 3.77–5.28)
SODIUM SERPL-SCNC: 141 MMOL/L (ref 136–145)
TRIGL SERPL-MCNC: 169 MG/DL (ref 0–150)
VLDLC SERPL CALC-MCNC: 33.8 MG/DL
WBC # BLD AUTO: 8.25 10*3/MM3 (ref 3.4–10.8)

## 2020-02-22 LAB
BACTERIA UR CULT: ABNORMAL
BACTERIA UR CULT: ABNORMAL
OTHER ANTIBIOTIC SUSC ISLT: ABNORMAL

## 2020-03-23 RX ORDER — AMITRIPTYLINE HYDROCHLORIDE 10 MG/1
TABLET, FILM COATED ORAL
Qty: 270 TABLET | Refills: 0 | Status: SHIPPED | OUTPATIENT
Start: 2020-03-23 | End: 2020-06-26 | Stop reason: SDUPTHER

## 2020-03-25 ENCOUNTER — TELEPHONE (OUTPATIENT)
Dept: FAMILY MEDICINE CLINIC | Facility: CLINIC | Age: 60
End: 2020-03-25

## 2020-03-25 NOTE — TELEPHONE ENCOUNTER
PT  IS REQUEST TO CHANGE THE PT PHARMACY     [id number] - [facility] [place] - [phone] AT SEC OF [facility] [address] - [phone]  - [phone] FX    PLEASE ADVISE

## 2020-06-26 RX ORDER — AMITRIPTYLINE HYDROCHLORIDE 10 MG/1
TABLET, FILM COATED ORAL
Qty: 270 TABLET | Refills: 1 | Status: SHIPPED | OUTPATIENT
Start: 2020-06-26 | End: 2020-09-30

## 2020-08-11 RX ORDER — ESCITALOPRAM OXALATE 10 MG/1
10 TABLET ORAL DAILY
Qty: 90 TABLET | Refills: 0 | Status: SHIPPED | OUTPATIENT
Start: 2020-08-11 | End: 2020-09-30 | Stop reason: SDUPTHER

## 2020-08-11 RX ORDER — ESTRADIOL 1 MG/1
1 TABLET ORAL DAILY
Qty: 90 TABLET | Refills: 0 | Status: SHIPPED | OUTPATIENT
Start: 2020-08-11 | End: 2020-09-30 | Stop reason: SDUPTHER

## 2020-08-11 RX ORDER — ATORVASTATIN CALCIUM 10 MG/1
10 TABLET, FILM COATED ORAL NIGHTLY
Qty: 90 TABLET | Refills: 0 | Status: SHIPPED | OUTPATIENT
Start: 2020-08-11 | End: 2020-09-30 | Stop reason: SDUPTHER

## 2020-09-30 ENCOUNTER — OFFICE VISIT (OUTPATIENT)
Dept: FAMILY MEDICINE CLINIC | Facility: CLINIC | Age: 60
End: 2020-09-30

## 2020-09-30 VITALS
HEIGHT: 65 IN | OXYGEN SATURATION: 95 % | SYSTOLIC BLOOD PRESSURE: 116 MMHG | BODY MASS INDEX: 27.66 KG/M2 | HEART RATE: 87 BPM | TEMPERATURE: 97.5 F | DIASTOLIC BLOOD PRESSURE: 79 MMHG | WEIGHT: 166 LBS

## 2020-09-30 DIAGNOSIS — R23.8 ABNORMAL SKIN COLOR: ICD-10-CM

## 2020-09-30 DIAGNOSIS — G89.29 CHRONIC PAIN OF LEFT KNEE: ICD-10-CM

## 2020-09-30 DIAGNOSIS — M25.562 CHRONIC PAIN OF LEFT KNEE: ICD-10-CM

## 2020-09-30 DIAGNOSIS — Z00.00 PREVENTATIVE HEALTH CARE: ICD-10-CM

## 2020-09-30 DIAGNOSIS — F41.9 ANXIETY: ICD-10-CM

## 2020-09-30 DIAGNOSIS — G47.00 INSOMNIA, UNSPECIFIED TYPE: Primary | ICD-10-CM

## 2020-09-30 DIAGNOSIS — E78.2 MIXED HYPERLIPIDEMIA: ICD-10-CM

## 2020-09-30 DIAGNOSIS — E55.9 VITAMIN D DEFICIENCY: ICD-10-CM

## 2020-09-30 PROBLEM — R06.00 DYSPNEA: Status: RESOLVED | Noted: 2017-08-22 | Resolved: 2020-09-30

## 2020-09-30 PROCEDURE — 99214 OFFICE O/P EST MOD 30 MIN: CPT | Performed by: NURSE PRACTITIONER

## 2020-09-30 RX ORDER — ATORVASTATIN CALCIUM 10 MG/1
10 TABLET, FILM COATED ORAL NIGHTLY
Qty: 90 TABLET | Refills: 1 | Status: SHIPPED | OUTPATIENT
Start: 2020-09-30 | End: 2021-04-13 | Stop reason: SDUPTHER

## 2020-09-30 RX ORDER — PANTOPRAZOLE SODIUM 40 MG/1
TABLET, DELAYED RELEASE ORAL DAILY
COMMUNITY
Start: 2020-09-25 | End: 2021-01-18

## 2020-09-30 RX ORDER — ESCITALOPRAM OXALATE 10 MG/1
10 TABLET ORAL DAILY
Qty: 90 TABLET | Refills: 1 | Status: SHIPPED | OUTPATIENT
Start: 2020-09-30 | End: 2021-01-18 | Stop reason: SDUPTHER

## 2020-09-30 RX ORDER — ESTRADIOL 1 MG/1
1 TABLET ORAL DAILY
Qty: 90 TABLET | Refills: 1 | Status: SHIPPED | OUTPATIENT
Start: 2020-09-30 | End: 2021-04-13 | Stop reason: SDUPTHER

## 2020-09-30 RX ORDER — ZOLPIDEM TARTRATE 5 MG/1
5 TABLET ORAL NIGHTLY PRN
Qty: 30 TABLET | Refills: 2 | Status: SHIPPED | OUTPATIENT
Start: 2020-09-30 | End: 2021-01-07

## 2020-10-01 ENCOUNTER — LAB (OUTPATIENT)
Dept: FAMILY MEDICINE CLINIC | Facility: CLINIC | Age: 60
End: 2020-10-01

## 2020-10-01 DIAGNOSIS — Z00.00 PREVENTATIVE HEALTH CARE: ICD-10-CM

## 2020-10-01 DIAGNOSIS — E78.2 MIXED HYPERLIPIDEMIA: ICD-10-CM

## 2020-10-01 DIAGNOSIS — E55.9 VITAMIN D DEFICIENCY: ICD-10-CM

## 2020-10-01 PROCEDURE — 36415 COLL VENOUS BLD VENIPUNCTURE: CPT | Performed by: NURSE PRACTITIONER

## 2020-10-01 PROCEDURE — 80053 COMPREHEN METABOLIC PANEL: CPT | Performed by: NURSE PRACTITIONER

## 2020-10-01 PROCEDURE — 82306 VITAMIN D 25 HYDROXY: CPT | Performed by: NURSE PRACTITIONER

## 2020-10-01 PROCEDURE — 85027 COMPLETE CBC AUTOMATED: CPT | Performed by: NURSE PRACTITIONER

## 2020-10-01 PROCEDURE — 84443 ASSAY THYROID STIM HORMONE: CPT | Performed by: NURSE PRACTITIONER

## 2020-10-01 PROCEDURE — 80061 LIPID PANEL: CPT | Performed by: NURSE PRACTITIONER

## 2020-10-02 LAB
25(OH)D3 SERPL-MCNC: 20.9 NG/ML (ref 30–100)
ALBUMIN SERPL-MCNC: 4.2 G/DL (ref 3.5–5.2)
ALBUMIN/GLOB SERPL: 1.5 G/DL
ALP SERPL-CCNC: 63 U/L (ref 39–117)
ALT SERPL W P-5'-P-CCNC: 18 U/L (ref 1–33)
ANION GAP SERPL CALCULATED.3IONS-SCNC: 7.9 MMOL/L (ref 5–15)
AST SERPL-CCNC: 27 U/L (ref 1–32)
BILIRUB SERPL-MCNC: 0.4 MG/DL (ref 0–1.2)
BUN SERPL-MCNC: 13 MG/DL (ref 6–20)
BUN/CREAT SERPL: 16.3 (ref 7–25)
CALCIUM SPEC-SCNC: 9.4 MG/DL (ref 8.6–10.5)
CHLORIDE SERPL-SCNC: 104 MMOL/L (ref 98–107)
CHOLEST SERPL-MCNC: 157 MG/DL (ref 0–200)
CO2 SERPL-SCNC: 27.1 MMOL/L (ref 22–29)
CREAT SERPL-MCNC: 0.8 MG/DL (ref 0.57–1)
DEPRECATED RDW RBC AUTO: 45.6 FL (ref 37–54)
ERYTHROCYTE [DISTWIDTH] IN BLOOD BY AUTOMATED COUNT: 12.7 % (ref 12.3–15.4)
GFR SERPL CREATININE-BSD FRML MDRD: 73 ML/MIN/1.73
GLOBULIN UR ELPH-MCNC: 2.8 GM/DL
GLUCOSE SERPL-MCNC: 73 MG/DL (ref 65–99)
HCT VFR BLD AUTO: 38.5 % (ref 34–46.6)
HDLC SERPL-MCNC: 54 MG/DL (ref 40–60)
HGB BLD-MCNC: 12.9 G/DL (ref 12–15.9)
LDLC SERPL CALC-MCNC: 77 MG/DL (ref 0–100)
LDLC/HDLC SERPL: 1.43 {RATIO}
MCH RBC QN AUTO: 32.6 PG (ref 26.6–33)
MCHC RBC AUTO-ENTMCNC: 33.5 G/DL (ref 31.5–35.7)
MCV RBC AUTO: 97.2 FL (ref 79–97)
PLATELET # BLD AUTO: 277 10*3/MM3 (ref 140–450)
PMV BLD AUTO: 11.2 FL (ref 6–12)
POTASSIUM SERPL-SCNC: 4.5 MMOL/L (ref 3.5–5.2)
PROT SERPL-MCNC: 7 G/DL (ref 6–8.5)
RBC # BLD AUTO: 3.96 10*6/MM3 (ref 3.77–5.28)
SODIUM SERPL-SCNC: 139 MMOL/L (ref 136–145)
TRIGL SERPL-MCNC: 129 MG/DL (ref 0–150)
TSH SERPL DL<=0.05 MIU/L-ACNC: 3.16 UIU/ML (ref 0.27–4.2)
VLDLC SERPL-MCNC: 25.8 MG/DL (ref 5–40)
WBC # BLD AUTO: 5.27 10*3/MM3 (ref 3.4–10.8)

## 2020-10-07 ENCOUNTER — TELEPHONE (OUTPATIENT)
Dept: FAMILY MEDICINE CLINIC | Facility: CLINIC | Age: 60
End: 2020-10-07

## 2020-10-07 NOTE — TELEPHONE ENCOUNTER
Pt asked if she should stop taking her amitriptyline for a day before starting the ambien so that the two do not interact?

## 2020-10-19 RX ORDER — ERGOCALCIFEROL 1.25 MG/1
CAPSULE ORAL
Qty: 15 CAPSULE | Refills: 1 | Status: SHIPPED | OUTPATIENT
Start: 2020-10-19 | End: 2021-01-18 | Stop reason: SDUPTHER

## 2020-10-22 NOTE — PROGRESS NOTES
Pt asked at what point can she stop taking the cholesterol medication?  If she can stop taking, can she stop taking abruptly?

## 2020-11-02 ENCOUNTER — TREATMENT (OUTPATIENT)
Dept: PHYSICAL THERAPY | Facility: CLINIC | Age: 60
End: 2020-11-02

## 2020-11-02 DIAGNOSIS — Z96.652 H/O TOTAL KNEE REPLACEMENT, LEFT: Primary | ICD-10-CM

## 2020-11-02 PROCEDURE — 97162 PT EVAL MOD COMPLEX 30 MIN: CPT | Performed by: PHYSICAL THERAPIST

## 2020-11-02 PROCEDURE — 97140 MANUAL THERAPY 1/> REGIONS: CPT | Performed by: PHYSICAL THERAPIST

## 2020-11-02 NOTE — PROGRESS NOTES
Physical Therapy Initial Evaluation and Plan of Care    Patient: Denisse Flores   : 1960  Diagnosis/ICD-10 Code:  H/O total knee replacement, left [Z96.652]  Referring practitioner: Pedro Priest MD    Subjective Evaluation    History of Present Illness  Mechanism of injury: Pt reports she is coming to PT due to continued lack of full knee ROM, as well as pain and weakness.  She had a L TKA in 2017.  Since then she has undergone a revision (2018) as well as a DAX (2019).  She has had extensive PT, but has not been able to regain all function.  She has also been tested for any allergies to see if she is allergic to anything in her prothesis.  They found she is allergic to a few different things; however, none of them are found in her knee replacement parts.  She reports that she is continuing to have difficulty walking, and when she first goes to get up she has a lot of pain.  She is not even able to do a full grocery trip and put items at home away without resting or help.        Patient Occupation: Unemployed - use to clean homes, cannot perforn now 2' knee issue  Quality of life: fair    Pain  Current pain ratin  At best pain ratin  At worst pain ratin  Location: L knee (medial>lateral)  Quality: sharp, tight, throbbing and discomfort  Relieving factors: ice, relaxation, rest, support, medications and change in position  Aggravating factors: standing, stairs, ambulation, prolonged positioning, squatting and repetitive movement  Progression: no change    Social Support  Lives with: spouse    Diagnostic Tests  X-ray: abnormal (L TKA)    Treatments  Previous treatment: physical therapy and medication  Current treatment: physical therapy  Patient Goals  Patient goals for therapy: decreased pain, improved balance, increased motion, increased strength, independence with ADLs/IADLs, return to sport/leisure activities and return to work             Objective          Palpation     Right Tenderness  of the vastus medialis.     Tenderness     Right Knee   Tenderness in the inferior patella, medial joint line and medial patella.     Active Range of Motion   Left Knee   Flexion: 90 degrees   Extensor la degrees     Right Knee   Normal active range of motion    Patellar Mobility   Left Knee Hypomobile in the left medial, left lateral, left superior and left inferior patellar tendon(s).     Ambulation   Weight-Bearing Status   Weight-Bearing Status (Left): weight-bearing as tolerated     Ambulation: Level Surfaces   Ambulation without assistive device: independent    Observational Gait   Gait: antalgic   Left swing time within functional limits. Decreased walking speed, stride length, left stance time and left step length.   Left foot contact pattern: foot flat  Left arm swing: high guard  Base of support: normal          Assessment & Plan     Assessment  Impairments: abnormal gait, abnormal or restricted ROM, activity intolerance, impaired balance, impaired physical strength, lacks appropriate home exercise program, pain with function and weight-bearing intolerance  Assessment details: Patient is a 60 y.o. female who presents to therapy with L knee pain.  She presents with significant impairments including reduced AROM/PROM, reduced patellar mobility, decreased strength, impaired gait mechanics, and increased pain. Impairments affect ADL/IADL's, functional activities, recreational activities, and community activities. Pt will benefit from skilled physical therapy to address impairments, decrease pain and restore function.     Prognosis: good  Functional Limitations: walking, uncomfortable because of pain, moving in bed, sitting, standing, stooping and unable to perform repetitive tasks  Goals  Plan Goals: Pt would benefit from skilled PT intervention to address the deficits noted.     SHORT TERM GOALS: Time for Goal Achievement: 5 weeks    1.  Patient to be compliant and independent with progression of HEP.                     2.  Pain level <5/10 at worst with mentioned activities to improve function/weight bearing tolerance.   3.  Patient to improve her L knee flexion/extension AROM to 100 degrees/0 degrees to improve their ability to ambulate with less deviation.     LONG TERM GOALS: Time for Goal Achievement: 10 weeks  1.  Pt. to score > 70 % on LEFS outcome measure.   2.  Pain level < 4/10 with all listed activities to return to higher level of function.  3.  Knee flexion AROM to 110 degrees or greater to allow for return to household & recreational activities w/less increase in symptoms.   4.  (L) LE strength to 4+ to 5/5 to allow for pushing, pulling and activities to occur without pain (driving, sitting, household  & community activities).          Plan  Therapy options: will be seen for skilled physical therapy services  Planned modality interventions: cryotherapy, electrical stimulation/Russian stimulation, TENS, thermotherapy (hydrocollator packs), dry needling and hydrotherapy  Planned therapy interventions: abdominal trunk stabilization, balance/weight-bearing training, flexibility, functional ROM exercises, gait training, home exercise program, joint mobilization, manual therapy, neuromuscular re-education, soft tissue mobilization, spinal/joint mobilization, strengthening, stretching, therapeutic activities and body mechanics training  Frequency: 2x week  Duration in visits: 20  Treatment plan discussed with: patient        History # of Personal Factors and/or Comorbidities: MODERATE (1-2)  Examination of Body System(s): # of elements: MODERATE (3)  Clinical Presentation: EVOLVING  Clinical Decision Making: MODERATE    Timed:         Manual Therapy:    12     mins  52578;     Therapeutic Exercise:         mins  41066;     Neuromuscular Marilyn:        mins  08663;    Therapeutic Activity:          mins  21786;     Gait Training:           mins  27754;     Ultrasound:          mins  40415;    Ionto                                    mins   73945  Self Care                            mins   21018        Un-Timed:  Electrical Stimulation:         mins  48329 ( );  Dry Needling          mins self-pay  Traction          mins 78628  Low Eval          Mins  21399  Mod Eval     30     Mins  76613  High Eval                            Mins  85710  Re-Eval                               mins  94129        Timed Treatment:   12   mins   Total Treatment:     42   mins  PT SIGNATURE: Sarah Hansen, PT, DPT    DATE TREATMENT INITIATED: 11/2/2020    Initial Certification  Certification Period: 1/31/2021  I certify that the therapy services are furnished while this patient is under my care.  The services outlined above are required by this patient, and will be reviewed every 90 days.     PHYSICIAN: Pedro Priest MD      DATE:     Please sign and return via fax to [phone].. Thank you, [facility].

## 2020-12-01 ENCOUNTER — DOCUMENTATION (OUTPATIENT)
Dept: PHYSICAL THERAPY | Facility: CLINIC | Age: 60
End: 2020-12-01

## 2020-12-01 NOTE — PROGRESS NOTES
Discharge Summary  Discharge Summary from Physical Therapy Report      Date of Initial PT visit: 11/02/2020  Number of Visits Seen: 1    Discharge Status of Patient: Pt's ins out of network.  Did not schedule any further visits.     Goals: Not Met    Discharge Plan: Future need for rehabilitation activities    Date of Discharge: 12/01/2020      Sarah Hansen, PT, DPT  Physical Therapist

## 2020-12-31 PROCEDURE — U0003 INFECTIOUS AGENT DETECTION BY NUCLEIC ACID (DNA OR RNA); SEVERE ACUTE RESPIRATORY SYNDROME CORONAVIRUS 2 (SARS-COV-2) (CORONAVIRUS DISEASE [COVID-19]), AMPLIFIED PROBE TECHNIQUE, MAKING USE OF HIGH THROUGHPUT TECHNOLOGIES AS DESCRIBED BY CMS-2020-01-R: HCPCS | Performed by: NURSE PRACTITIONER

## 2021-01-06 DIAGNOSIS — G47.00 INSOMNIA, UNSPECIFIED TYPE: ICD-10-CM

## 2021-01-07 RX ORDER — ZOLPIDEM TARTRATE 5 MG/1
5 TABLET ORAL NIGHTLY PRN
Qty: 30 TABLET | Refills: 2 | Status: SHIPPED | OUTPATIENT
Start: 2021-01-07 | End: 2021-04-13 | Stop reason: SDUPTHER

## 2021-01-15 ENCOUNTER — TELEPHONE (OUTPATIENT)
Dept: FAMILY MEDICINE CLINIC | Facility: CLINIC | Age: 61
End: 2021-01-15

## 2021-01-15 NOTE — TELEPHONE ENCOUNTER
Caller: Denisse Flores    Relationship to patient: Self    Best call back number: [phone]  Patient is needing: PATIENT IS CALLING IN REGARDS TO HER MEDICATION REFILL ON ZOLPIDEM. SHE STATED THAT [facility] IS WAITING ON DR ELLIOTT APPROVAL FOR REFILL, BUT IN HER CHART IT SHOWS THAT HER PHARMACY RECEIVED THE MEDICATION REFILL REQUEST ON 01/07/2021. PATIENT IS COMPLETELY OUT OF MEDICATION AND IS NEEDING MEDICATION REFILLED AS SOON AS POSSIBLE.     PATIENTS PHARMACY IS     [id number] [address] AT [facility] [phone] [facility] [phone] FX      PLEASE ADVISE

## 2021-01-15 NOTE — TELEPHONE ENCOUNTER
Spoke with pt and she checked again with the pharmacy and they do have her refill. She will pick that up today.

## 2021-01-18 ENCOUNTER — OFFICE VISIT (OUTPATIENT)
Dept: FAMILY MEDICINE CLINIC | Facility: CLINIC | Age: 61
End: 2021-01-18

## 2021-01-18 VITALS
HEIGHT: 65 IN | TEMPERATURE: 97.1 F | BODY MASS INDEX: 26.99 KG/M2 | DIASTOLIC BLOOD PRESSURE: 78 MMHG | HEART RATE: 105 BPM | OXYGEN SATURATION: 98 % | WEIGHT: 162 LBS | SYSTOLIC BLOOD PRESSURE: 131 MMHG

## 2021-01-18 DIAGNOSIS — E55.9 VITAMIN D DEFICIENCY: ICD-10-CM

## 2021-01-18 DIAGNOSIS — J30.89 SEASONAL ALLERGIC RHINITIS DUE TO OTHER ALLERGIC TRIGGER: ICD-10-CM

## 2021-01-18 DIAGNOSIS — E78.2 MIXED HYPERLIPIDEMIA: ICD-10-CM

## 2021-01-18 DIAGNOSIS — F41.9 ANXIETY: ICD-10-CM

## 2021-01-18 DIAGNOSIS — Z00.00 PREVENTATIVE HEALTH CARE: ICD-10-CM

## 2021-01-18 DIAGNOSIS — G47.00 INSOMNIA, UNSPECIFIED TYPE: Primary | ICD-10-CM

## 2021-01-18 DIAGNOSIS — Z12.31 BREAST CANCER SCREENING BY MAMMOGRAM: ICD-10-CM

## 2021-01-18 PROCEDURE — 99214 OFFICE O/P EST MOD 30 MIN: CPT | Performed by: NURSE PRACTITIONER

## 2021-01-18 RX ORDER — ERGOCALCIFEROL 1.25 MG/1
CAPSULE ORAL
Qty: 15 CAPSULE | Refills: 1 | Status: SHIPPED | OUTPATIENT
Start: 2021-01-18 | End: 2021-04-08

## 2021-01-18 RX ORDER — ESCITALOPRAM OXALATE 10 MG/1
10 TABLET ORAL DAILY
Qty: 90 TABLET | Refills: 1 | Status: SHIPPED | OUTPATIENT
Start: 2021-01-18 | End: 2021-06-14

## 2021-01-18 RX ORDER — ERGOCALCIFEROL 1.25 MG/1
CAPSULE ORAL
Qty: 15 CAPSULE | Refills: 1 | Status: SHIPPED | OUTPATIENT
Start: 2021-01-18 | End: 2021-01-18

## 2021-01-18 NOTE — PROGRESS NOTES
Chief Complaint  Follow-up (3 months follow up on insomnia) and Earache (check ears just had an ear infection and would like it checked again. Right ear pain)    Subjective          Denisse Flores presents to [facility] for   History of Present Illness     Hyperlipidemia, The patient denies muscle aches, constipation, diarrhea, GI upset, fatigue, chest pain/pressure, exercise intolerance, dyspnea, palpitations, syncope and pedal edema.      Insomnia, This is a chronic problem, started more than 1 year ago, pt stable on medication. Pt denies abdominal pain, arthralgias, chills, coughing, fever, joint swelling, myalgias, nausea, neck pain, rash, sore throat, vomiting or weakness, pt reports daytime fatigue. Tried melatonin and amitriptyline, both ineffective. Ambien effective for staying asleep and provides significant relief.    Anxiety, patient denies significant weight loss/gain, insomnia, hypersomnia, psychomotor agitation, psychomotor retardation, fatigue (loss of energy), feelings of worthlessness (guilt), impaired concentration (indecisiveness), thoughts of death or suicide.       Tested positive for COVID19 on 12/31, also with R ear infection, completed amoxicillin. S/s improved and and resolved. New hearing aids causing discomfort to right ear    Answers for HPI/ROS submitted by the patient on 1/16/2021   What is the primary reason for your visit?: Other  Please describe your symptoms.: Pain in ear & neck similar to  ear infection or starting into strep throat.  Have you had these symptoms before?: Yes  How long have you been having these symptoms?: Greater than 2 weeks  Please list any medications you are currently taking for this condition.: Amoxicillin 875 mg completed yesterday  Please describe any probable cause for these symptoms. : It is normal for me in the season changes to get an ear infection or strep due to my allergies  Objective   Vital Signs:   /78 (BP  "Location: Left arm, Patient Position: Sitting, Cuff Size: Adult)   Pulse 105   Temp 97.1 °F (36.2 °C) (Skin)   Ht 165.1 cm (65\")   Wt 73.5 kg (162 lb)   SpO2 98%   BMI 26.96 kg/m²     Physical Exam  Vitals signs and nursing note reviewed.   Constitutional:       General: She is not in acute distress.     Appearance: She is well-developed. She is not diaphoretic.   HENT:      Left Ear: Tympanic membrane and ear canal normal.      Ears:      Comments: Right TM scarred without effusion, canal with moderate amount of cerumen.   Eyes:      Pupils: Pupils are equal, round, and reactive to light.   Neck:      Musculoskeletal: Normal range of motion and neck supple.      Thyroid: No thyromegaly.      Vascular: No JVD.   Cardiovascular:      Rate and Rhythm: Normal rate and regular rhythm.      Heart sounds: Normal heart sounds. No murmur.   Pulmonary:      Effort: Pulmonary effort is normal. No respiratory distress.      Breath sounds: Normal breath sounds.   Abdominal:      General: Bowel sounds are normal. There is no distension.      Palpations: Abdomen is soft.      Tenderness: There is no abdominal tenderness.   Musculoskeletal: Normal range of motion.         General: No tenderness.   Skin:     General: Skin is warm and dry.   Neurological:      Mental Status: She is alert and oriented to person, place, and time.      Sensory: No sensory deficit.   Psychiatric:         Behavior: Behavior normal.         Thought Content: Thought content normal.         Judgment: Judgment normal.        Result Review :                 Assessment and Plan    Problem List Items Addressed This Visit        Mental Health    Anxiety       Sleep    Insomnia - Primary      Other Visit Diagnoses     Mixed hyperlipidemia        Relevant Orders    Lipid Panel    Comprehensive Metabolic Panel    CBC (No Diff)    Preventative health care        Relevant Orders    TSH    Vitamin D deficiency        Relevant Orders    Vitamin D 25 Hydroxy    " Breast cancer screening by mammogram        Relevant Orders    Mammo Screening Digital Tomosynthesis Bilateral With CAD    Seasonal allergic rhinitis due to other allergic trigger            1.  mammo done at [facility], will call to request results.   2.  Ordered mammogram to update. Will notify results.   3.  Reviewed previous labs and essentially stable, lipids wnl-continue atorva 5mg nightly. will plan to repeat HTN panel again in 3 months-prior to 3-month follow-up appointment  4. Cont all meds, stable, will need 3mo f/u for ambien refills-just refilled on 1/7   5. rec shingles vaccine at pharmacy  6. Cont/refill vitamin d. Check D with next labs.   7. Ears wnl, f/u with hearing aid specialist for proper fitting/adjustments, no further abx needed.     I spent 25 minutes caring for Denisse on this date of service. This time includes time spent by me in the following activities:preparing for the visit, reviewing tests, performing a medically appropriate examination and/or evaluation , counseling and educating the patient/family/caregiver and documenting information in the medical record     Follow Up   Return in about 11 weeks (around 4/5/2021) for HTN panel 1 wk prior to appt, f/u on HTN, lipids, insomnia, needs ambien refill.  Patient was given instructions and counseling regarding her condition or for health maintenance advice. Please see specific information pulled into the AVS if appropriate.

## 2021-01-19 ENCOUNTER — TELEPHONE (OUTPATIENT)
Dept: FAMILY MEDICINE CLINIC | Facility: CLINIC | Age: 61
End: 2021-01-19

## 2021-01-19 NOTE — TELEPHONE ENCOUNTER
I called to try and get mammogram results for patient at [facility] in [place]. They stated the last result they have on file for this patient is from 2018. They do not show anything after that time. Is this the result you are wanting?

## 2021-01-20 RX ORDER — ERGOCALCIFEROL 1.25 MG/1
CAPSULE ORAL
Qty: 16 CAPSULE | OUTPATIENT
Start: 2021-01-20

## 2021-04-08 RX ORDER — ERGOCALCIFEROL 1.25 MG/1
CAPSULE ORAL
Qty: 15 CAPSULE | Refills: 1 | Status: SHIPPED | OUTPATIENT
Start: 2021-04-08 | End: 2021-10-13 | Stop reason: SDUPTHER

## 2021-04-12 ENCOUNTER — LAB (OUTPATIENT)
Dept: FAMILY MEDICINE CLINIC | Facility: CLINIC | Age: 61
End: 2021-04-12

## 2021-04-12 DIAGNOSIS — E55.9 VITAMIN D DEFICIENCY: ICD-10-CM

## 2021-04-12 DIAGNOSIS — E78.2 MIXED HYPERLIPIDEMIA: ICD-10-CM

## 2021-04-12 DIAGNOSIS — R30.9 PAIN WITH URINATION: Primary | ICD-10-CM

## 2021-04-12 DIAGNOSIS — Z00.00 PREVENTATIVE HEALTH CARE: ICD-10-CM

## 2021-04-12 LAB
BILIRUB BLD-MCNC: NEGATIVE MG/DL
CLARITY, POC: CLEAR
COLOR UR: YELLOW
GLUCOSE UR STRIP-MCNC: NEGATIVE MG/DL
KETONES UR QL: NEGATIVE
LEUKOCYTE EST, POC: NEGATIVE
NITRITE UR-MCNC: NEGATIVE MG/ML
PH UR: 5 [PH] (ref 5–8)
PROT UR STRIP-MCNC: NEGATIVE MG/DL
RBC # UR STRIP: NEGATIVE /UL
SP GR UR: 1.03 (ref 1–1.03)
UROBILINOGEN UR QL: NORMAL

## 2021-04-12 PROCEDURE — 85027 COMPLETE CBC AUTOMATED: CPT | Performed by: NURSE PRACTITIONER

## 2021-04-12 PROCEDURE — 80061 LIPID PANEL: CPT | Performed by: NURSE PRACTITIONER

## 2021-04-12 PROCEDURE — 84443 ASSAY THYROID STIM HORMONE: CPT | Performed by: NURSE PRACTITIONER

## 2021-04-12 PROCEDURE — 36415 COLL VENOUS BLD VENIPUNCTURE: CPT | Performed by: NURSE PRACTITIONER

## 2021-04-12 PROCEDURE — 81002 URINALYSIS NONAUTO W/O SCOPE: CPT | Performed by: NURSE PRACTITIONER

## 2021-04-12 PROCEDURE — 80053 COMPREHEN METABOLIC PANEL: CPT | Performed by: NURSE PRACTITIONER

## 2021-04-12 PROCEDURE — 82306 VITAMIN D 25 HYDROXY: CPT | Performed by: NURSE PRACTITIONER

## 2021-04-13 ENCOUNTER — OFFICE VISIT (OUTPATIENT)
Dept: FAMILY MEDICINE CLINIC | Facility: CLINIC | Age: 61
End: 2021-04-13

## 2021-04-13 VITALS
TEMPERATURE: 97.1 F | HEART RATE: 109 BPM | OXYGEN SATURATION: 96 % | WEIGHT: 163.2 LBS | BODY MASS INDEX: 27.19 KG/M2 | HEIGHT: 65 IN | SYSTOLIC BLOOD PRESSURE: 128 MMHG | DIASTOLIC BLOOD PRESSURE: 73 MMHG

## 2021-04-13 DIAGNOSIS — M79.642 PAIN IN LEFT HAND: Primary | ICD-10-CM

## 2021-04-13 DIAGNOSIS — M25.562 CHRONIC PAIN OF LEFT KNEE: ICD-10-CM

## 2021-04-13 DIAGNOSIS — E78.2 MIXED HYPERLIPIDEMIA: ICD-10-CM

## 2021-04-13 DIAGNOSIS — G89.29 CHRONIC PAIN OF LEFT KNEE: ICD-10-CM

## 2021-04-13 DIAGNOSIS — R79.89 ABNORMAL TSH: ICD-10-CM

## 2021-04-13 DIAGNOSIS — Z78.0 POSTMENOPAUSAL: ICD-10-CM

## 2021-04-13 DIAGNOSIS — G47.00 INSOMNIA, UNSPECIFIED TYPE: ICD-10-CM
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
## 2021-04-13 DIAGNOSIS — E55.9 VITAMIN D DEFICIENCY: ICD-10-CM

## 2021-04-13 DIAGNOSIS — F41.9 ANXIETY: ICD-10-CM

## 2021-04-13 LAB
25(OH)D3 SERPL-MCNC: 18.8 NG/ML
ALBUMIN SERPL-MCNC: 4.2 G/DL (ref 3.5–5.2)
ALBUMIN/GLOB SERPL: 1.7 G/DL
ALP SERPL-CCNC: 67 U/L (ref 39–117)
ALT SERPL W P-5'-P-CCNC: 20 U/L (ref 1–33)
ANION GAP SERPL CALCULATED.3IONS-SCNC: 9.6 MMOL/L (ref 5–15)
AST SERPL-CCNC: 21 U/L (ref 1–32)
BILIRUB SERPL-MCNC: 0.4 MG/DL (ref 0–1.2)
BUN SERPL-MCNC: 15 MG/DL (ref 6–20)
BUN/CREAT SERPL: 22.4 (ref 7–25)
CALCIUM SPEC-SCNC: 9.3 MG/DL (ref 8.6–10.5)
CHLORIDE SERPL-SCNC: 106 MMOL/L (ref 98–107)
CHOLEST SERPL-MCNC: 180 MG/DL (ref 0–200)
CO2 SERPL-SCNC: 27.4 MMOL/L (ref 22–29)
CREAT SERPL-MCNC: 0.67 MG/DL (ref 0.57–1)
DEPRECATED RDW RBC AUTO: 44.7 FL (ref 37–54)
ERYTHROCYTE [DISTWIDTH] IN BLOOD BY AUTOMATED COUNT: 13.1 % (ref 12.3–15.4)
GFR SERPL CREATININE-BSD FRML MDRD: 90 ML/MIN/1.73
GLOBULIN UR ELPH-MCNC: 2.5 GM/DL
GLUCOSE SERPL-MCNC: 79 MG/DL (ref 65–99)
HCT VFR BLD AUTO: 38.8 % (ref 34–46.6)
HDLC SERPL-MCNC: 58 MG/DL (ref 40–60)
HGB BLD-MCNC: 13 G/DL (ref 12–15.9)
LDLC SERPL CALC-MCNC: 96 MG/DL (ref 0–100)
LDLC/HDLC SERPL: 1.58 {RATIO}
MCH RBC QN AUTO: 31.4 PG (ref 26.6–33)
MCHC RBC AUTO-ENTMCNC: 33.5 G/DL (ref 31.5–35.7)
MCV RBC AUTO: 93.7 FL (ref 79–97)
PLATELET # BLD AUTO: 302 10*3/MM3 (ref 140–450)
PMV BLD AUTO: 11.1 FL (ref 6–12)
POTASSIUM SERPL-SCNC: 4.1 MMOL/L (ref 3.5–5.2)
PROT SERPL-MCNC: 6.7 G/DL (ref 6–8.5)
RBC # BLD AUTO: 4.14 10*6/MM3 (ref 3.77–5.28)
SODIUM SERPL-SCNC: 143 MMOL/L (ref 136–145)
TRIGL SERPL-MCNC: 151 MG/DL (ref 0–150)
TSH SERPL DL<=0.05 MIU/L-ACNC: 5.53 UIU/ML (ref 0.27–4.2)
VLDLC SERPL-MCNC: 26 MG/DL (ref 5–40)
WBC # BLD AUTO: 5.74 10*3/MM3 (ref 3.4–10.8)

## 2021-04-13 PROCEDURE — 99214 OFFICE O/P EST MOD 30 MIN: CPT | Performed by: NURSE PRACTITIONER

## 2021-04-13 RX ORDER — ESTRADIOL 1 MG/1
1 TABLET ORAL DAILY
Qty: 90 TABLET | Refills: 1 | Status: SHIPPED | OUTPATIENT
Start: 2021-04-13 | End: 2021-10-13 | Stop reason: SDUPTHER

## 2021-04-13 RX ORDER — ZOLPIDEM TARTRATE 10 MG/1
10 TABLET ORAL NIGHTLY PRN
Qty: 30 TABLET | Refills: 2 | Status: SHIPPED | OUTPATIENT
Start: 2021-04-13 | End: 2021-07-14

## 2021-04-13 RX ORDER — ATORVASTATIN CALCIUM 10 MG/1
10 TABLET, FILM COATED ORAL NIGHTLY
Qty: 90 TABLET | Refills: 1 | Status: SHIPPED | OUTPATIENT
Start: 2021-04-13 | End: 2021-10-13 | Stop reason: SDUPTHER

## 2021-04-13 NOTE — PROGRESS NOTES
Chief Complaint  Hand Swelling (d/t fall injury, pt reports pain with moving fingers.  had xrays at , records in chart.  pt reports she was told it isn't broken.) and Follow-up (3-month follow-up to review labs)    Subjective          Denisse Flores presents to [facility] for   History of Present Illness     Patient is here to follow-up on chronic conditions, 3-month follow-up appointment and to review labs collected yesterday.  She is also here for an acute visit to reevaluate her left hand.    1.  L hand pain d/t fall, she reports fatigue left middle and ring fingers bent backwards, presented to urgent care center on 4/6/2021.  X-rays revealed no fractures or dislocation (see x-ray below).  Patient reports pain still persists and has very minimal mobility of the left middle and ring fingers, hand is still swollen, bruising has improved somewhat, there is no pain at the wrist.     XR HAND MIN 3 VWS LT        DATE: 04/06/2021        COMPARISON: None available        INDICATION: left hand injury, pain, bruising, swelling.        FINDINGS: There is no acute fracture or dislocation. Joint spaces are maintained. Some mild soft tissue swelling over the dorsal aspect of the hand is noted. No opaque foreign bodies or soft tissue gas.        IMPRESSION: No acute bony abnormality.       2.  HTN, stable on meds and takes as directed, denies chest pain, headache, shortness of air, palpitations and swelling of extremities.     3.  Hyperlipidemia, The patient denies muscle aches, constipation, diarrhea, GI upset, fatigue, chest pain/pressure, exercise intolerance, dyspnea, palpitations, syncope and pedal edema.      4.  Insomnia, This is a chronic problem, started immediately following total hysterectomy.  Pt denies abdominal pain, chills, coughing, fever, myalgias, nausea, neck pain, rash, sore throat, vomiting or weakness, pt reports daytime fatigue. Nothing aggravates the symptoms. Treatments  tried: OTC sleep aids ineffective, ambien 5mg is minimally effective-she wakes up at 3am, she was on 10mg in past and effective for staying asleep and provided significant relief, she denies ever sleepwalking or eating in the middle of the night unknowingly    5. Anxiety, patient denies significant weight loss/gain, insomnia, hypersomnia, psychomotor agitation, psychomotor retardation, fatigue (loss of energy), feelings of worthlessness (guilt), impaired concentration (indecisiveness), thoughts of death or suicide.       6.  Chronic left knee pain, with history of L total knee replacement, mini revision per Dr. Priest, has also received joint injections, with minimal improvement in mobility and pain.  She is now undergoing physical therapy recommended per Dr. Priest as well      The following portions of the patient's history were reviewed and updated as appropriate: allergies, current medications, past family history, past medical history, past social history, past surgical history and problem list.    Past Medical History:   Diagnosis Date   • Anxiety    • Arthritis    • Cancer (CMS/HCC)    • Environmental allergies    • High cholesterol    • History of panic attacks    • History of skin cancer    • History of transfusion    • Insomnia    • Knee pain    • Migraine    • Vitamin D deficiency      Past Surgical History:   Procedure Laterality Date   •  SECTION      X 3   • CHOLECYSTECTOMY     • DILATATION AND CURETTAGE     • HYSTERECTOMY     • JOINT MANIPULATION Left 10/22/2019    Procedure: MANIPULATION OF KNEE with steriod injection;  Surgeon: Pedro Priest MD;  Location: [facility];  Service: Orthopedics   • KNEE MINI REVISION Left 8/15/2019    Procedure: LEFT TOTAL KNEE ARTHROPLASTY REVISION POLY CHANGE;  Surgeon: Pedro Priest MD;  Location: [facility];  Service: Orthopedics   • OK TOTAL KNEE ARTHROPLASTY Left 2017    Procedure: LT TOTAL KNEE ARTHROPLASTY;  Surgeon: Pedro LEMONS  "MD Cem;  Location: [facility] OR;  Service: Orthopedics     Family History   Problem Relation Age of Onset   • Migraines Mother    • Diabetes Maternal Grandmother      Social History     Tobacco Use   • Smoking status: Never Smoker   • Smokeless tobacco: Never Used   Substance Use Topics   • Alcohol use: Yes     Comment: RARELY       Current Outpatient Medications:   •  atorvastatin (LIPITOR) 10 MG tablet, Take 1 tablet by mouth Every Night., Disp: 90 tablet, Rfl: 1  •  escitalopram (LEXAPRO) 10 MG tablet, Take 1 tablet by mouth Daily., Disp: 90 tablet, Rfl: 1  •  estradiol (ESTRACE) 1 MG tablet, Take 1 tablet by mouth Daily., Disp: 90 tablet, Rfl: 1  •  Omega-3 Fatty Acids (FISH OIL) 1000 MG capsule capsule, Take 1,000 mg by mouth 2 (Two) Times a Day With Meals. HOLDING FOR SURGERY, Disp: , Rfl:   •  vitamin C (ASCORBIC ACID) 500 MG tablet, Take 500 mg by mouth Daily., Disp: , Rfl:   •  vitamin D (ERGOCALCIFEROL) 1.25 MG (72706 UT) capsule capsule, TAKE 1 CAPSULE BY MOUTH EVERY DAY FOR 3 DAYS, THEN 1 CAPSULE BY MOUTH ONCE WEEKLY ON MONDAYS, Disp: 15 capsule, Rfl: 1  •  zolpidem (AMBIEN) 10 MG tablet, Take 1 tablet by mouth At Night As Needed for Sleep., Disp: 30 tablet, Rfl: 2  •  vitamin B-12 (CYANOCOBALAMIN) 1000 MCG tablet, Take 1,000 mcg by mouth Daily., Disp: , Rfl:     Objective   Vital Signs:   /73 (BP Location: Right arm, Patient Position: Sitting, Cuff Size: Adult)   Pulse 109   Temp 97.1 °F (36.2 °C) (Infrared)   Ht 165.1 cm (65\")   Wt 74 kg (163 lb 3.2 oz)   SpO2 96%   BMI 27.16 kg/m²       Physical Exam  Vitals and nursing note reviewed.   Constitutional:       General: She is not in acute distress.     Appearance: She is well-developed. She is not diaphoretic.   Eyes:      Pupils: Pupils are equal, round, and reactive to light.   Neck:      Thyroid: No thyromegaly.      Vascular: No JVD.   Cardiovascular:      Rate and Rhythm: Normal rate and regular rhythm.      Heart sounds: Normal " heart sounds. No murmur heard.     Pulmonary:      Effort: Pulmonary effort is normal. No respiratory distress.      Breath sounds: Normal breath sounds.   Abdominal:      General: Bowel sounds are normal. There is no distension.      Palpations: Abdomen is soft.      Tenderness: There is no abdominal tenderness.   Musculoskeletal:         General: Tenderness present. Normal range of motion.      Cervical back: Normal range of motion and neck supple.   Skin:     General: Skin is warm and dry.   Neurological:      Mental Status: She is alert and oriented to person, place, and time.      Sensory: No sensory deficit.   Psychiatric:         Behavior: Behavior normal.         Thought Content: Thought content normal.         Judgment: Judgment normal.          Result Review :     Lab on 04/12/2021   Component Date Value Ref Range Status   • Total Cholesterol 04/12/2021 180  0 - 200 mg/dL Final   • Triglycerides 04/12/2021 151* 0 - 150 mg/dL Final   • HDL Cholesterol 04/12/2021 58  40 - 60 mg/dL Final   • LDL Cholesterol  04/12/2021 96  0 - 100 mg/dL Final   • VLDL Cholesterol 04/12/2021 26  5 - 40 mg/dL Final   • LDL/HDL Ratio 04/12/2021 1.58   Final   • Glucose 04/12/2021 79  65 - 99 mg/dL Final   • BUN 04/12/2021 15  6 - 20 mg/dL Final   • Creatinine 04/12/2021 0.67  0.57 - 1.00 mg/dL Final   • Sodium 04/12/2021 143  136 - 145 mmol/L Final   • Potassium 04/12/2021 4.1  3.5 - 5.2 mmol/L Final   • Chloride 04/12/2021 106  98 - 107 mmol/L Final   • CO2 04/12/2021 27.4  22.0 - 29.0 mmol/L Final   • Calcium 04/12/2021 9.3  8.6 - 10.5 mg/dL Final   • Total Protein 04/12/2021 6.7  6.0 - 8.5 g/dL Final   • Albumin 04/12/2021 4.20  3.50 - 5.20 g/dL Final   • ALT (SGPT) 04/12/2021 20  1 - 33 U/L Final   • AST (SGOT) 04/12/2021 21  1 - 32 U/L Final   • Alkaline Phosphatase 04/12/2021 67  39 - 117 U/L Final   • Total Bilirubin 04/12/2021 0.4  0.0 - 1.2 mg/dL Final   • eGFR Non African Amer 04/12/2021 90  >60 mL/min/1.73 Final   •  Globulin 04/12/2021 2.5  gm/dL Final   • A/G Ratio 04/12/2021 1.7  g/dL Final   • BUN/Creatinine Ratio 04/12/2021 22.4  7.0 - 25.0 Final   • Anion Gap 04/12/2021 9.6  5.0 - 15.0 mmol/L Final   • WBC 04/12/2021 5.74  3.40 - 10.80 10*3/mm3 Final   • RBC 04/12/2021 4.14  3.77 - 5.28 10*6/mm3 Final   • Hemoglobin 04/12/2021 13.0  12.0 - 15.9 g/dL Final   • Hematocrit 04/12/2021 38.8  34.0 - 46.6 % Final   • MCV 04/12/2021 93.7  79.0 - 97.0 fL Final   • MCH 04/12/2021 31.4  26.6 - 33.0 pg Final   • MCHC 04/12/2021 33.5  31.5 - 35.7 g/dL Final   • RDW 04/12/2021 13.1  12.3 - 15.4 % Final   • RDW-SD 04/12/2021 44.7  37.0 - 54.0 fl Final   • MPV 04/12/2021 11.1  6.0 - 12.0 fL Final   • Platelets 04/12/2021 302  140 - 450 10*3/mm3 Final   • TSH 04/12/2021 5.530* 0.270 - 4.200 uIU/mL Final   • 25 Hydroxy, Vitamin D 04/12/2021 18.8  ng/ml Final   • Color 04/12/2021 Yellow  Yellow, Straw, Dark Yellow, Tereza Final   • Clarity, UA 04/12/2021 Clear  Clear Final   • Glucose, UA 04/12/2021 Negative  Negative, 1000 mg/dL (3+) mg/dL Final   • Bilirubin 04/12/2021 Negative  Negative Final   • Ketones, UA 04/12/2021 Negative  Negative Final   • Specific Gravity  04/12/2021 1.030  1.005 - 1.030 Final   • Blood, UA 04/12/2021 Negative  Negative Final   • pH, Urine 04/12/2021 5.0  5.0 - 8.0 Final   • Protein, POC 04/12/2021 Negative  Negative mg/dL Final   • Urobilinogen, UA 04/12/2021 Normal  Normal Final   • Leukocytes 04/12/2021 Negative  Negative Final   • Nitrite, UA 04/12/2021 Negative  Negative Final                       Assessment and Plan    Diagnoses and all orders for this visit:    1. Pain in left hand (Primary)  Comments:  X-ray recently negative for fracture, check MRI and refer if needed.  Recommend RICE, NSAIDs as needed  Orders:  -     MRI Hand Left With Contrast; Future    2. Accidental fall, initial encounter  -     MRI Hand Left With Contrast; Future    3. Insomnia, unspecified type  Comments:  Pt has tried and failed  Elavil, trazodone, melatonin. Ambien 5mg minimally effective, ok to resume 10mg, d/w pt possible s/e and to call prn  Orders:  -     zolpidem (AMBIEN) 10 MG tablet; Take 1 tablet by mouth At Night As Needed for Sleep.  Dispense: 30 tablet; Refill: 2    4. Mixed hyperlipidemia  Comments:  Reviewed labs, lipids are stable, continue/rf statin as directed    5. Postmenopausal  Comments:  s/p hysterectomy, on estradiol, stable but would like to wean.  Recommend attempt cutting estradiol in half 0.5 mg daily, do not stop abruptly    6. Chronic pain of left knee  Comments:  Poor mobility status post left TKR/revision/injections, continue to follow-up with Dr. Priest as directed, currently undergoing physical therapy    7. Vitamin D deficiency  Comments:  Reviewed labs, low vitamin D recommend continuation of weekly dose    8. Anxiety  Comments:  Stable, continue Lexapro no refill needed at this time    9. Abnormal TSH  Comments:  aymptomatic, recheck TSH with next labs in 6mo, earlier if become symptomatic.    Other orders  -     atorvastatin (LIPITOR) 10 MG tablet; Take 1 tablet by mouth Every Night.  Dispense: 90 tablet; Refill: 1  -     estradiol (ESTRACE) 1 MG tablet; Take 1 tablet by mouth Daily.  Dispense: 90 tablet; Refill: 1      Mammogram unable to be found at [facility], ordered in January for [facility], patient needs to schedule      I spent 35 minutes caring for Denisse on this date of service. This time includes time spent by me in the following activities:preparing for the visit, reviewing tests, performing a medically appropriate examination and/or evaluation , counseling and educating the patient/family/caregiver, ordering medications, tests, or procedures and documenting information in the medical record    Follow Up   Return in about 6 months (around 10/13/2021) for HTN, insomnia, HTN panel, vit d 1 week prior to follow-up appointment.  Patient was given instructions and counseling  regarding her condition or for health maintenance advice. Please see specific information pulled into the AVS if appropriate.

## 2021-04-14 ENCOUNTER — TELEPHONE (OUTPATIENT)
Dept: FAMILY MEDICINE CLINIC | Facility: CLINIC | Age: 61
End: 2021-04-14

## 2021-04-14 NOTE — TELEPHONE ENCOUNTER
Caller: Denisse Flores    Relationship: Self    Best call back number: [phone]    What is the medical concern/diagnosis: IF HAND IS BROKEN    What specialty or service is being requested: REFERRAL    What is the provider, practice or medical service name: NO PREFERENCE     What is the office location: [place]        Any additional details: PLEASE CALL PATIENT BACK WITH INFORMATION.

## 2021-04-15 NOTE — TELEPHONE ENCOUNTER
Once I reviewed the MRI we will decide if a referral is necessary.  But yes, I will refer her to an orthopedic if there is a fracture or other abnormalities

## 2021-04-19 ENCOUNTER — TELEPHONE (OUTPATIENT)
Dept: FAMILY MEDICINE CLINIC | Facility: CLINIC | Age: 61
End: 2021-04-19
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
## 2021-04-19 DIAGNOSIS — M79.642 PAIN IN LEFT HAND: ICD-10-CM

## 2021-04-19 NOTE — TELEPHONE ENCOUNTER
Patient scheduled her MRI for tomorrow but the hospital called indicating that the order is for with contrast and they only do without.  Please advise.  Thanks.  cc

## 2021-04-19 NOTE — TELEPHONE ENCOUNTER
Patient called stating she still has not heard from the hospital regarding scheduling her MRI.  She would like to have this done soon.  She states she still cannot  or hold a glass or really anything else.  She is not sure if she needs to call her insurance, the hospital or should she try to get in at [facility].  Please advise.  Thanks.  Cc  Patient would like call back.

## 2021-04-20 ENCOUNTER — HOSPITAL ENCOUNTER (OUTPATIENT)
Dept: MRI IMAGING | Facility: HOSPITAL | Age: 61
Discharge: HOME | End: 2021-04-20
Admitting: NURSE PRACTITIONER

## 2021-04-20 DIAGNOSIS — M79.642 PAIN IN LEFT HAND: ICD-10-CM
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
## 2021-04-20 PROCEDURE — 73218 MRI UPPER EXTREMITY W/O DYE: CPT

## 2021-04-22 ENCOUNTER — TELEPHONE (OUTPATIENT)
Dept: FAMILY MEDICINE CLINIC | Facility: CLINIC | Age: 61
End: 2021-04-22

## 2021-04-22 NOTE — TELEPHONE ENCOUNTER
Please let patient know the cartilage and ligaments of the hand are stable.   There is posttraumatic bone marrow swelling of the ring and little fingers, there is a nondisplaced fracture at the back side of the index finger. This should be splinted/immobilized for another 2-3 weeks, no surgery or cast needed. Rest, ice, elevate as much as possible. If not improving at all I can refer her to ortho for eval if she wishes.

## 2021-04-22 NOTE — TELEPHONE ENCOUNTER
Caller: Denisse Flores    Relationship: Self    Best call back number: [phone]    Caller requesting test results: YES    What test was performed: MRI    When was the test performed: 4/20/2021    Where was the test performed: [facility]    Additional notes: PLEASE CALL WITH RESULTS TODAY IF POSSIBLE.

## 2021-04-23 ENCOUNTER — TELEPHONE (OUTPATIENT)
Dept: FAMILY MEDICINE CLINIC | Facility: CLINIC | Age: 61
End: 2021-04-23

## 2021-04-23 NOTE — TELEPHONE ENCOUNTER
Pt called today about the results of her MRI today.  I told her about the fracture of her index finger.  She said she is having the most trouble out of her middle finger and she is actually able to use her index finger easily with no pain.  She asked if she should splint her middle finger too?  She asked if she should continue to use the ace bandage?  She wanted to let you know that her knuckles are still swollen, but she is icing her hand which is helping with the pain.

## 2021-04-23 NOTE — TELEPHONE ENCOUNTER
There is a lot of swelling there where she is having the problem. She can splint the ring and middle fingers together, yes continue ace wrap to keep it more stable.

## 2021-05-05 ENCOUNTER — TELEPHONE (OUTPATIENT)
Dept: FAMILY MEDICINE CLINIC | Facility: CLINIC | Age: 61
End: 2021-05-05
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
## 2021-05-05 DIAGNOSIS — S62.345G: Primary | ICD-10-CM

## 2021-05-05 NOTE — TELEPHONE ENCOUNTER
Caller: Denisse Flores    Relationship to patient: Self    Best call back number: [phone]    Patient is needing: PATIENTS HAND IS STILL SWOLLEN. SHE WOULD LIKE TO KNOW IF TATI WANTS TO SEE HER AGAIN OR IF SHE COULD SEND HER TO A HAND DOCTOR. PLEASE REACH OUT AND ADVISE PATIENT.

## 2021-06-14 RX ORDER — ESCITALOPRAM OXALATE 10 MG/1
10 TABLET ORAL DAILY
Qty: 90 TABLET | Refills: 1 | Status: SHIPPED | OUTPATIENT
Start: 2021-06-14 | End: 2021-10-13 | Stop reason: SDUPTHER

## 2021-06-14 RX ORDER — ATORVASTATIN CALCIUM 10 MG/1
10 TABLET, FILM COATED ORAL NIGHTLY
Qty: 90 TABLET | Refills: 1 | OUTPATIENT
Start: 2021-06-14

## 2021-06-14 RX ORDER — ESTRADIOL 1 MG/1
1 TABLET ORAL DAILY
Qty: 90 TABLET | Refills: 1 | OUTPATIENT
Start: 2021-06-14

## 2021-07-13 DIAGNOSIS — G47.00 INSOMNIA, UNSPECIFIED TYPE: ICD-10-CM

## 2021-07-14 RX ORDER — ZOLPIDEM TARTRATE 10 MG/1
10 TABLET ORAL NIGHTLY PRN
Qty: 30 TABLET | Refills: 0 | Status: SHIPPED | OUTPATIENT
Start: 2021-07-14 | End: 2021-08-15

## 2021-07-17 DIAGNOSIS — G47.00 INSOMNIA, UNSPECIFIED TYPE: ICD-10-CM

## 2021-07-19 RX ORDER — ZOLPIDEM TARTRATE 10 MG/1
10 TABLET ORAL NIGHTLY PRN
Qty: 30 TABLET | OUTPATIENT
Start: 2021-07-19

## 2021-08-13 DIAGNOSIS — G47.00 INSOMNIA, UNSPECIFIED TYPE: ICD-10-CM

## 2021-08-15 RX ORDER — ZOLPIDEM TARTRATE 10 MG/1
10 TABLET ORAL NIGHTLY PRN
Qty: 30 TABLET | Refills: 2 | Status: SHIPPED | OUTPATIENT
Start: 2021-08-15 | End: 2021-10-13 | Stop reason: SDUPTHER

## 2021-10-06 ENCOUNTER — LAB (OUTPATIENT)
Dept: FAMILY MEDICINE CLINIC | Facility: CLINIC | Age: 61
End: 2021-10-06

## 2021-10-06 DIAGNOSIS — E78.00 HYPERCHOLESTEREMIA: ICD-10-CM

## 2021-10-06 DIAGNOSIS — D72.829 LEUKOCYTOSIS, UNSPECIFIED TYPE: Primary | ICD-10-CM

## 2021-10-06 DIAGNOSIS — R79.89 LOW VITAMIN D LEVEL: ICD-10-CM

## 2021-10-06 PROCEDURE — 82306 VITAMIN D 25 HYDROXY: CPT | Performed by: NURSE PRACTITIONER

## 2021-10-06 PROCEDURE — 80061 LIPID PANEL: CPT | Performed by: NURSE PRACTITIONER

## 2021-10-06 PROCEDURE — 85027 COMPLETE CBC AUTOMATED: CPT | Performed by: NURSE PRACTITIONER

## 2021-10-06 PROCEDURE — 36415 COLL VENOUS BLD VENIPUNCTURE: CPT | Performed by: NURSE PRACTITIONER

## 2021-10-06 PROCEDURE — 80053 COMPREHEN METABOLIC PANEL: CPT | Performed by: NURSE PRACTITIONER

## 2021-10-06 PROCEDURE — 84443 ASSAY THYROID STIM HORMONE: CPT | Performed by: NURSE PRACTITIONER

## 2021-10-07 LAB
25(OH)D3 SERPL-MCNC: 35.6 NG/ML (ref 30–100)
ALBUMIN SERPL-MCNC: 4.5 G/DL (ref 3.5–5.2)
ALBUMIN/GLOB SERPL: 1.7 G/DL
ALP SERPL-CCNC: 66 U/L (ref 39–117)
ALT SERPL W P-5'-P-CCNC: 20 U/L (ref 1–33)
ANION GAP SERPL CALCULATED.3IONS-SCNC: 10.2 MMOL/L (ref 5–15)
AST SERPL-CCNC: 24 U/L (ref 1–32)
BILIRUB SERPL-MCNC: 0.5 MG/DL (ref 0–1.2)
BUN SERPL-MCNC: 14 MG/DL (ref 8–23)
BUN/CREAT SERPL: 17.7 (ref 7–25)
CALCIUM SPEC-SCNC: 9.6 MG/DL (ref 8.6–10.5)
CHLORIDE SERPL-SCNC: 103 MMOL/L (ref 98–107)
CHOLEST SERPL-MCNC: 198 MG/DL (ref 0–200)
CO2 SERPL-SCNC: 26.8 MMOL/L (ref 22–29)
CREAT SERPL-MCNC: 0.79 MG/DL (ref 0.57–1)
DEPRECATED RDW RBC AUTO: 44.2 FL (ref 37–54)
ERYTHROCYTE [DISTWIDTH] IN BLOOD BY AUTOMATED COUNT: 12.4 % (ref 12.3–15.4)
GFR SERPL CREATININE-BSD FRML MDRD: 74 ML/MIN/1.73
GLOBULIN UR ELPH-MCNC: 2.7 GM/DL
GLUCOSE SERPL-MCNC: 81 MG/DL (ref 65–99)
HCT VFR BLD AUTO: 39.7 % (ref 34–46.6)
HDLC SERPL-MCNC: 62 MG/DL (ref 40–60)
HGB BLD-MCNC: 13.3 G/DL (ref 12–15.9)
LDLC SERPL CALC-MCNC: 114 MG/DL (ref 0–100)
LDLC/HDLC SERPL: 1.78 {RATIO}
MCH RBC QN AUTO: 32.5 PG (ref 26.6–33)
MCHC RBC AUTO-ENTMCNC: 33.5 G/DL (ref 31.5–35.7)
MCV RBC AUTO: 97.1 FL (ref 79–97)
PLATELET # BLD AUTO: 277 10*3/MM3 (ref 140–450)
PMV BLD AUTO: 10.8 FL (ref 6–12)
POTASSIUM SERPL-SCNC: 4.3 MMOL/L (ref 3.5–5.2)
PROT SERPL-MCNC: 7.2 G/DL (ref 6–8.5)
RBC # BLD AUTO: 4.09 10*6/MM3 (ref 3.77–5.28)
SODIUM SERPL-SCNC: 140 MMOL/L (ref 136–145)
TRIGL SERPL-MCNC: 127 MG/DL (ref 0–150)
TSH SERPL DL<=0.05 MIU/L-ACNC: 3.09 UIU/ML (ref 0.27–4.2)
VLDLC SERPL-MCNC: 22 MG/DL (ref 5–40)
WBC # BLD AUTO: 6.03 10*3/MM3 (ref 3.4–10.8)

## 2021-10-13 ENCOUNTER — OFFICE VISIT (OUTPATIENT)
Dept: FAMILY MEDICINE CLINIC | Facility: CLINIC | Age: 61
End: 2021-10-13

## 2021-10-13 VITALS
SYSTOLIC BLOOD PRESSURE: 106 MMHG | BODY MASS INDEX: 26.66 KG/M2 | HEIGHT: 65 IN | OXYGEN SATURATION: 97 % | HEART RATE: 84 BPM | WEIGHT: 160 LBS | DIASTOLIC BLOOD PRESSURE: 70 MMHG | TEMPERATURE: 98 F

## 2021-10-13 DIAGNOSIS — M79.642 PAIN IN LEFT HAND: ICD-10-CM

## 2021-10-13 DIAGNOSIS — G89.29 CHRONIC PAIN OF LEFT KNEE: ICD-10-CM

## 2021-10-13 DIAGNOSIS — E78.00 HYPERCHOLESTEREMIA: ICD-10-CM

## 2021-10-13 DIAGNOSIS — G47.00 INSOMNIA, UNSPECIFIED TYPE: ICD-10-CM

## 2021-10-13 DIAGNOSIS — Z78.0 POSTMENOPAUSAL: ICD-10-CM

## 2021-10-13 DIAGNOSIS — R68.89 HEAT INTOLERANCE: Primary | ICD-10-CM

## 2021-10-13 DIAGNOSIS — F41.9 ANXIETY: ICD-10-CM

## 2021-10-13 DIAGNOSIS — E55.9 VITAMIN D DEFICIENCY: ICD-10-CM

## 2021-10-13 DIAGNOSIS — E78.2 MIXED HYPERLIPIDEMIA: ICD-10-CM

## 2021-10-13 DIAGNOSIS — M25.562 CHRONIC PAIN OF LEFT KNEE: ICD-10-CM

## 2021-10-13 PROCEDURE — 99214 OFFICE O/P EST MOD 30 MIN: CPT | Performed by: NURSE PRACTITIONER

## 2021-10-13 RX ORDER — ESTRADIOL 1 MG/1
1 TABLET ORAL DAILY
Qty: 90 TABLET | Refills: 1 | Status: SHIPPED | OUTPATIENT
Start: 2021-10-13 | End: 2021-10-15 | Stop reason: SDUPTHER

## 2021-10-13 RX ORDER — ESCITALOPRAM OXALATE 10 MG/1
10 TABLET ORAL DAILY
Qty: 90 TABLET | Refills: 1 | Status: SHIPPED | OUTPATIENT
Start: 2021-10-13 | End: 2021-10-15 | Stop reason: SDUPTHER

## 2021-10-13 RX ORDER — ERGOCALCIFEROL 1.25 MG/1
CAPSULE ORAL
Qty: 12 CAPSULE | Refills: 1 | Status: SHIPPED | OUTPATIENT
Start: 2021-10-13 | End: 2021-10-15 | Stop reason: SDUPTHER

## 2021-10-13 RX ORDER — ZOLPIDEM TARTRATE 10 MG/1
10 TABLET ORAL NIGHTLY PRN
Qty: 30 TABLET | Refills: 2 | Status: SHIPPED | OUTPATIENT
Start: 2021-10-13 | End: 2021-10-15 | Stop reason: SDUPTHER

## 2021-10-13 RX ORDER — SUVOREXANT 20 MG/1
1 TABLET, FILM COATED ORAL NIGHTLY
Qty: 3 TABLET | Refills: 0 | COMMUNITY
Start: 2021-10-13 | End: 2022-04-13

## 2021-10-13 RX ORDER — ATORVASTATIN CALCIUM 10 MG/1
10 TABLET, FILM COATED ORAL NIGHTLY
Qty: 90 TABLET | Refills: 1 | Status: SHIPPED | OUTPATIENT
Start: 2021-10-13 | End: 2022-04-13 | Stop reason: SDUPTHER

## 2021-10-15 DIAGNOSIS — F41.9 ANXIETY: ICD-10-CM

## 2021-10-15 DIAGNOSIS — Z78.0 POSTMENOPAUSAL: ICD-10-CM

## 2021-10-15 DIAGNOSIS — E55.9 VITAMIN D DEFICIENCY: ICD-10-CM

## 2021-10-15 DIAGNOSIS — G47.00 INSOMNIA, UNSPECIFIED TYPE: ICD-10-CM

## 2021-10-15 RX ORDER — ESTRADIOL 1 MG/1
1 TABLET ORAL DAILY
Qty: 90 TABLET | Refills: 1 | Status: SHIPPED | OUTPATIENT
Start: 2021-10-15 | End: 2022-04-13 | Stop reason: SDUPTHER

## 2021-10-15 RX ORDER — ZOLPIDEM TARTRATE 10 MG/1
10 TABLET ORAL NIGHTLY PRN
Qty: 30 TABLET | Refills: 2 | Status: SHIPPED | OUTPATIENT
Start: 2021-10-15 | End: 2021-10-19 | Stop reason: SDUPTHER

## 2021-10-15 RX ORDER — ESCITALOPRAM OXALATE 10 MG/1
10 TABLET ORAL DAILY
Qty: 90 TABLET | Refills: 1 | Status: SHIPPED | OUTPATIENT
Start: 2021-10-15 | End: 2022-04-13 | Stop reason: SDUPTHER

## 2021-10-15 RX ORDER — ERGOCALCIFEROL 1.25 MG/1
CAPSULE ORAL
Qty: 12 CAPSULE | Refills: 1 | Status: SHIPPED | OUTPATIENT
Start: 2021-10-15 | End: 2022-04-13 | Stop reason: SDUPTHER

## 2021-10-15 NOTE — TELEPHONE ENCOUNTER
Left message on the prescriber line to cancel these refills at the [facility] in [place].

## 2021-10-15 NOTE — TELEPHONE ENCOUNTER
Pls cancel rx at [facility] pharmacy next door, especially ambien. I will resend to juan manuel loya [facility].

## 2021-10-15 NOTE — TELEPHONE ENCOUNTER
Caller: Denisse Flores    Relationship: Self    Medication requested (name and dosage):   ESTRADIOL 1MG  VITAMIN D2  ZOLPIDEM 10 MG  ESCITALOPRAM 10 MG    Pharmacy where request should be sent:   [facility] #[id number] [facility] [address] AT [facility] - [phone]  - [phone]   [phone]    Additional details provided by patient:   PATIENT STATES THAT PHARMACY THAT THESE WERE SENT TO IS NOT ANSWERING HER PHONE CALL AND SHE IS UNABLE TO  THIS MEDICATION. PATIENT IS REQUESTING FOR THESE TO BE SENT TO ALTERNATIVE PHARMACY ASAP     Best call back number: [phone]    Does the patient have less than a 3 day supply:  [x] Yes  [] No    Swapna Juarez Rep   10/15/21 12:42 EDT

## 2021-10-19 DIAGNOSIS — G47.00 INSOMNIA, UNSPECIFIED TYPE: ICD-10-CM

## 2021-10-19 RX ORDER — ZOLPIDEM TARTRATE 10 MG/1
10 TABLET ORAL NIGHTLY PRN
Qty: 30 TABLET | Refills: 2 | Status: SHIPPED | OUTPATIENT
Start: 2021-10-19 | End: 2022-02-22

## 2021-10-19 NOTE — TELEPHONE ENCOUNTER
Caller: Denisse Flores    Relationship: Self    Medication requested (name and dosage):     Requested Prescriptions:   Requested Prescriptions     Pending Prescriptions Disp Refills   • zolpidem (AMBIEN) 10 MG tablet 30 tablet 2     Sig: Take 1 tablet by mouth At Night As Needed for Sleep.        Pharmacy where request should be sent: [id number] [address] AT SEC OF [facility] [address] [facility] [address]  - [phone] [facility] [phone]   [phone]    Additional details provided by patient: PATIENT STATES SHE IS OUT AND THE PHARMACY WAS UNABLE TO FILL.    Best call back number: [phone]    Does the patient have less than a 3 day supply:  [x] Yes  [] No    Swapna Tran Rep   10/19/21 13:10 EDT

## 2021-10-19 NOTE — TELEPHONE ENCOUNTER
Spoke with patient and she stated that this refill went to the wrong [facility] on [address]. She is asking if you can resend it because the other pharmacy already cancelled it and would not allow her to transfer the refill because it is a controlled medication. She also wanted to let you know that she could not take the Belsomra. She tried it for a day and it called vivid/bad dreams and she was afraid to try it again because of this.

## 2021-11-23 ENCOUNTER — TELEPHONE (OUTPATIENT)
Dept: FAMILY MEDICINE CLINIC | Facility: CLINIC | Age: 61
End: 2021-11-23

## 2021-11-23 NOTE — TELEPHONE ENCOUNTER
"PATIENT CALLED STATING SHE IS HAVING ISSUES GETTING HER MEDICATION    atorvastatin (LIPITOR) 10 MG tablet  zolpidem (AMBIEN) 10 MG tablet    SHE WENT TO MULTIPLE PHARMACIES AND NO ONE HAS IT, AND EVEN STATED THAT IT WAS FILLED, BUT NOT SURE WHERE IT IS.     IS THERE A WAY FOR YOU TO OVER- RIDE THIS TO GET A \"LOST OR EMERGENCY\" SUPPLY FOR HER?     [id number] [address] AT [facility] & [address] - [phone] [facility] [phone]   [phone]    THIS IS THE PHARMACY THAT IS PREFERRED     Denisse Flores (Self) [phone] (H)         SHE HAS BEEN OUT OF MEDS BECAUSE OF THIS   "

## 2021-12-14 NOTE — PROGRESS NOTES
Chief Complaint  Hypertension (pt reports that she has been overheating for no reason, it's causing anxiety, she reports that she almost had a panic attack.  she is curious if it's r/t hormones or bp), Insomnia, Results (10/6 labs), and Follow-up (6 mo; would like to discuss shingles vaccine)    Subjective          Denisse Flores presents to [facility] for   History of Present Illness       HTN, stable on meds and takes as directed, denies chest pain, headache, shortness of air, palpitations and swelling of extremities.     Hyperlipidemia, The patient denies muscle aches, constipation, diarrhea, GI upset, fatigue, chest pain/pressure, exercise intolerance, dyspnea, palpitations, syncope and pedal edema.      Insomnia, This is a chronic problem, started more than 1 year ago. Nothing aggravates the symptoms. Treatments tried: OTC sleep aids ineffective, ambien increased to 10mg, still not staying asleep, wakes up at 4am.     Anxiety, patient denies significant weight loss/gain, insomnia, hypersomnia, psychomotor agitation, psychomotor retardation, fatigue (loss of energy), feelings of worthlessness (guilt), impaired concentration (indecisiveness), thoughts of death or suicide.       Chronic left knee pain, with history of L total knee replacement, mini revision per Dr. Priest, has also received joint injections, with minimal improvement in mobility and pain.  She is in physical [facility] [place], reports constant sharp/shooting pain, unable to bend the knee further than 90 degrees.     Left hand pain s/p fall, still unable to use fully and has poor rom, limited grasp, in PT as well at [place] [facility]. Following with KK.     Hotflashes, intermittent but worse and intolerant to heat.     Here to review labs      The following portions of the patient's history were reviewed and updated as appropriate: allergies, current medications, past  How Severe Is Your Skin Lesion?: mild Is This A New Presentation, Or A Follow-Up?: Skin Lesions family history, past medical history, past social history, past surgical history and problem list.    Past Medical History:   Diagnosis Date   • Allergic since childhood   • Anxiety    • Arthritis    • Cancer (HCC)    • Environmental allergies    • High cholesterol    • History of panic attacks    • History of skin cancer    • History of transfusion    • HL (hearing loss)    • Insomnia    • Knee pain    • Migraine    • Urinary tract infection    • Vitamin D deficiency      Past Surgical History:   Procedure Laterality Date   •  SECTION      X 3   • CHOLECYSTECTOMY     • DILATATION AND CURETTAGE     • HYSTERECTOMY     • JOINT MANIPULATION Left 10/22/2019    Procedure: MANIPULATION OF KNEE with steriod injection;  Surgeon: Pedro Priest MD;  Location: [facility];  Service: Orthopedics   • JOINT REPLACEMENT     • KNEE MINI REVISION Left 8/15/2019    Procedure: LEFT TOTAL KNEE ARTHROPLASTY REVISION POLY CHANGE;  Surgeon: Pedro Priest MD;  Location: [facility] OR;  Service: Orthopedics   • AK TOTAL KNEE ARTHROPLASTY Left 2017    Procedure: LT TOTAL KNEE ARTHROPLASTY;  Surgeon: Pedro Priest MD;  Location: [facility] OR;  Service: Orthopedics     Family History   Problem Relation Age of Onset   • Migraines Mother    • Diabetes Maternal Grandmother      Social History     Tobacco Use   • Smoking status: Never Smoker   • Smokeless tobacco: Never Used   Substance Use Topics   • Alcohol use: Yes     Alcohol/week: 0.0 standard drinks     Comment: ABOUT 2X YEARLY       Current Outpatient Medications:   •  atorvastatin (LIPITOR) 10 MG tablet, Take 1 tablet by mouth Every Night., Disp: 90 tablet, Rfl: 1  •  escitalopram (LEXAPRO) 10 MG tablet, Take 1 tablet by mouth Daily., Disp: 90 tablet, Rfl: 1  •  estradiol (ESTRACE) 1 MG tablet, Take 1 tablet by mouth Daily., Disp: 90 tablet, Rfl: 1  •  Omega-3 Fatty Acids (FISH OIL) 1000 MG capsule capsule, Take 1,000 mg by mouth 2 (Two) Times a Day With  "Meals. HOLDING FOR SURGERY, Disp: , Rfl:   •  vitamin C (ASCORBIC ACID) 500 MG tablet, Take 500 mg by mouth Daily., Disp: , Rfl:   •  vitamin D (ERGOCALCIFEROL) 1.25 MG (59743 UT) capsule capsule, TAKE 1 CAPSULE BY MOUTH ONCE WEEKLY ON MONDAYS, Disp: 12 capsule, Rfl: 1  •  zolpidem (AMBIEN) 10 MG tablet, Take 1 tablet by mouth At Night As Needed for Sleep., Disp: 30 tablet, Rfl: 2  •  Suvorexant (Belsomra) 20 MG tablet, Take 20 mg by mouth Every Night., Disp: 3 tablet, Rfl: 0    Objective   Vital Signs:   /70 (BP Location: Left arm, Patient Position: Sitting, Cuff Size: Adult)   Pulse 84   Temp 98 °F (36.7 °C) (Infrared)   Ht 165.1 cm (65\")   Wt 72.6 kg (160 lb)   SpO2 97%   BMI 26.63 kg/m²       Physical Exam  Vitals and nursing note reviewed.   Constitutional:       General: She is not in acute distress.     Appearance: She is well-developed. She is not diaphoretic.   Eyes:      Pupils: Pupils are equal, round, and reactive to light.   Neck:      Thyroid: No thyromegaly.      Vascular: No JVD.   Cardiovascular:      Rate and Rhythm: Normal rate and regular rhythm.      Heart sounds: Normal heart sounds. No murmur heard.      Pulmonary:      Effort: Pulmonary effort is normal. No respiratory distress.      Breath sounds: Normal breath sounds.   Abdominal:      General: Bowel sounds are normal. There is no distension.      Palpations: Abdomen is soft.      Tenderness: There is no abdominal tenderness.   Musculoskeletal:         General: Tenderness (L knee, chronic, severe banerjee rom. L hand mod banerjee rom.  ) present. Normal range of motion.      Cervical back: Normal range of motion and neck supple.   Skin:     General: Skin is warm and dry.   Neurological:      Mental Status: She is alert and oriented to person, place, and time.      Sensory: No sensory deficit.   Psychiatric:         Behavior: Behavior normal.         Thought Content: Thought content normal.         Judgment: Judgment normal.      " Is This A New Presentation, Or A Follow-Up?: Skin Lesion     Result Review :     No visits with results within 7 Day(s) from this visit.   Latest known visit with results is:   Lab on 10/06/2021   Component Date Value Ref Range Status   • Glucose 10/06/2021 81  65 - 99 mg/dL Final   • BUN 10/06/2021 14  8 - 23 mg/dL Final   • Creatinine 10/06/2021 0.79  0.57 - 1.00 mg/dL Final   • Sodium 10/06/2021 140  136 - 145 mmol/L Final   • Potassium 10/06/2021 4.3  3.5 - 5.2 mmol/L Final   • Chloride 10/06/2021 103  98 - 107 mmol/L Final   • CO2 10/06/2021 26.8  22.0 - 29.0 mmol/L Final   • Calcium 10/06/2021 9.6  8.6 - 10.5 mg/dL Final   • Total Protein 10/06/2021 7.2  6.0 - 8.5 g/dL Final   • Albumin 10/06/2021 4.50  3.50 - 5.20 g/dL Final   • ALT (SGPT) 10/06/2021 20  1 - 33 U/L Final   • AST (SGOT) 10/06/2021 24  1 - 32 U/L Final   • Alkaline Phosphatase 10/06/2021 66  39 - 117 U/L Final   • Total Bilirubin 10/06/2021 0.5  0.0 - 1.2 mg/dL Final   • eGFR Non African Amer 10/06/2021 74  >60 mL/min/1.73 Final   • Globulin 10/06/2021 2.7  gm/dL Final   • A/G Ratio 10/06/2021 1.7  g/dL Final   • BUN/Creatinine Ratio 10/06/2021 17.7  7.0 - 25.0 Final   • Anion Gap 10/06/2021 10.2  5.0 - 15.0 mmol/L Final   • WBC 10/06/2021 6.03  3.40 - 10.80 10*3/mm3 Final   • RBC 10/06/2021 4.09  3.77 - 5.28 10*6/mm3 Final   • Hemoglobin 10/06/2021 13.3  12.0 - 15.9 g/dL Final   • Hematocrit 10/06/2021 39.7  34.0 - 46.6 % Final   • MCV 10/06/2021 97.1* 79.0 - 97.0 fL Final   • MCH 10/06/2021 32.5  26.6 - 33.0 pg Final   • MCHC 10/06/2021 33.5  31.5 - 35.7 g/dL Final   • RDW 10/06/2021 12.4  12.3 - 15.4 % Final   • RDW-SD 10/06/2021 44.2  37.0 - 54.0 fl Final   • MPV 10/06/2021 10.8  6.0 - 12.0 fL Final   • Platelets 10/06/2021 277  140 - 450 10*3/mm3 Final   • TSH 10/06/2021 3.090  0.270 - 4.200 uIU/mL Final   • Total Cholesterol 10/06/2021 198  0 - 200 mg/dL Final   • Triglycerides 10/06/2021 127  0 - 150 mg/dL Final   • HDL Cholesterol 10/06/2021 62* 40 - 60 mg/dL Final   • LDL Cholesterol   10/06/2021 114* 0 - 100 mg/dL Final   • VLDL Cholesterol 10/06/2021 22  5 - 40 mg/dL Final   • LDL/HDL Ratio 10/06/2021 1.78   Final   • 25 Hydroxy, Vitamin D 10/06/2021 35.6  30.0 - 100.0 ng/ml Final                       Assessment and Plan    Diagnoses and all orders for this visit:    1. Heat intolerance (Primary)    2. Postmenopausal  -     estradiol (ESTRACE) 1 MG tablet; Take 1 tablet by mouth Daily.  Dispense: 90 tablet; Refill: 1    3. Pain in left hand    4. Chronic pain of left knee    5. Mixed hyperlipidemia  -     atorvastatin (LIPITOR) 10 MG tablet; Take 1 tablet by mouth Every Night.  Dispense: 90 tablet; Refill: 1    6. Insomnia, unspecified type  Comments:  improved w/ ambien but not staying asleep, given sample of belsomra 20mg.   Orders:  -     zolpidem (AMBIEN) 10 MG tablet; Take 1 tablet by mouth At Night As Needed for Sleep.  Dispense: 30 tablet; Refill: 2  -     Suvorexant (Belsomra) 20 MG tablet; Take 20 mg by mouth Every Night.  Dispense: 3 tablet; Refill: 0    7. Vitamin D deficiency  -     vitamin D (ERGOCALCIFEROL) 1.25 MG (36231 UT) capsule capsule; TAKE 1 CAPSULE BY MOUTH ONCE WEEKLY ON MONDAYS  Dispense: 12 capsule; Refill: 1    8. Hypercholesteremia    9. Anxiety  -     escitalopram (LEXAPRO) 10 MG tablet; Take 1 tablet by mouth Daily.  Dispense: 90 tablet; Refill: 1    -rec shingrix  -pt to call and sched mammo  -cont ambien, rf when needed, pt given sample belsomra if effective will send rx.    - reviewed labs, stable.   -heat intolerance likely anxiety vs pain. Consider hormone w/u. rec trial cut estradiol in 1/2  -f/u with [place] [facility] and PT for knee, pt to have EMG soon, ruling out CRP        I spent 30 minutes caring for Denisse Flores on this date of service. This time includes time spent by me in the following activities: preparing for the visit, reviewing tests, performing a medically appropriate examination and/or evaluation , counseling and educating the  patient/family/caregiver, ordering medications, tests, or procedures and documenting information in the medical record        Follow Up     Return in about 6 months (around 4/13/2022) for Recheck, htn panel, vit D, b12 and consider hormones.  Patient was given instructions and counseling regarding her condition or for health maintenance advice. Please see specific information pulled into the AVS if appropriate.      EMR Dragon transcription disclaimer:  Some of this encounter note is an electronic transcription translation of spoken language to printed text. The electronic translation of spoken language may permit erroneous, or at times, nonsensical words or phrases to be inadvertently transcribed; Although I have reviewed the note for such errors some may still exist.

## 2022-02-04 ENCOUNTER — TRANSCRIBE ORDERS (OUTPATIENT)
Dept: ADMINISTRATIVE | Facility: HOSPITAL | Age: 62
End: 2022-02-04

## 2022-02-04 ENCOUNTER — HOSPITAL ENCOUNTER (OUTPATIENT)
Dept: GENERAL RADIOLOGY | Facility: HOSPITAL | Age: 62
Discharge: HOME | End: 2022-02-04

## 2022-02-04 ENCOUNTER — LAB (OUTPATIENT)
Dept: LAB | Facility: HOSPITAL | Age: 62
End: 2022-02-04

## 2022-02-04 ENCOUNTER — HOSPITAL ENCOUNTER (OUTPATIENT)
Dept: CARDIOLOGY | Facility: HOSPITAL | Age: 62
Discharge: HOME | End: 2022-02-04

## 2022-02-04 DIAGNOSIS — Z01.818 PRE-OP TESTING: Primary | ICD-10-CM

## 2022-02-04 DIAGNOSIS — Z01.818 PRE-OP TESTING: ICD-10-CM

## 2022-02-04 LAB
APTT PPP: 23.9 SECONDS (ref 24–31)
BILIRUB UR QL STRIP: NEGATIVE
CLARITY UR: CLEAR
COLOR UR: YELLOW
DEPRECATED RDW RBC AUTO: 42.9 FL (ref 37–54)
ERYTHROCYTE [DISTWIDTH] IN BLOOD BY AUTOMATED COUNT: 12.7 % (ref 12.3–15.4)
GLUCOSE UR STRIP-MCNC: NEGATIVE MG/DL
HCT VFR BLD AUTO: 39.5 % (ref 34–46.6)
HGB BLD-MCNC: 13.3 G/DL (ref 12–15.9)
HGB UR QL STRIP.AUTO: NEGATIVE
INR PPP: 0.94 (ref 0.93–1.1)
KETONES UR QL STRIP: NEGATIVE
LEUKOCYTE ESTERASE UR QL STRIP.AUTO: NEGATIVE
MCH RBC QN AUTO: 32.6 PG (ref 26.6–33)
MCHC RBC AUTO-ENTMCNC: 33.8 G/DL (ref 31.5–35.7)
MCV RBC AUTO: 96.5 FL (ref 79–97)
NITRITE UR QL STRIP: NEGATIVE
PH UR STRIP.AUTO: <=5 [PH] (ref 5–8)
PLATELET # BLD AUTO: 287 10*3/MM3 (ref 140–450)
PMV BLD AUTO: 7.7 FL (ref 6–12)
PROT UR QL STRIP: NEGATIVE
PROTHROMBIN TIME: 10.5 SECONDS (ref 9.6–11.7)
QT INTERVAL: 397 MS
RBC # BLD AUTO: 4.09 10*6/MM3 (ref 3.77–5.28)
SARS-COV-2 ORF1AB RESP QL NAA+PROBE: NOT DETECTED
SP GR UR STRIP: 1.01 (ref 1–1.03)
UROBILINOGEN UR QL STRIP: NORMAL
WBC NRBC COR # BLD: 5.9 10*3/MM3 (ref 3.4–10.8)

## 2022-02-04 PROCEDURE — 36415 COLL VENOUS BLD VENIPUNCTURE: CPT

## 2022-02-04 PROCEDURE — 85027 COMPLETE CBC AUTOMATED: CPT

## 2022-02-04 PROCEDURE — U0004 COV-19 TEST NON-CDC HGH THRU: HCPCS

## 2022-02-04 PROCEDURE — 85610 PROTHROMBIN TIME: CPT

## 2022-02-04 PROCEDURE — C9803 HOPD COVID-19 SPEC COLLECT: HCPCS

## 2022-02-04 PROCEDURE — 93005 ELECTROCARDIOGRAM TRACING: CPT | Performed by: PAIN MEDICINE

## 2022-02-04 PROCEDURE — 93010 ELECTROCARDIOGRAM REPORT: CPT | Performed by: INTERNAL MEDICINE

## 2022-02-04 PROCEDURE — 85730 THROMBOPLASTIN TIME PARTIAL: CPT

## 2022-02-04 PROCEDURE — 71046 X-RAY EXAM CHEST 2 VIEWS: CPT

## 2022-02-04 PROCEDURE — 81003 URINALYSIS AUTO W/O SCOPE: CPT

## 2022-02-22 DIAGNOSIS — G47.00 INSOMNIA, UNSPECIFIED TYPE: ICD-10-CM

## 2022-02-22 RX ORDER — ZOLPIDEM TARTRATE 10 MG/1
10 TABLET ORAL NIGHTLY PRN
Qty: 30 TABLET | Refills: 1 | Status: SHIPPED | OUTPATIENT
Start: 2022-02-22 | End: 2022-04-13 | Stop reason: SDUPTHER

## 2022-02-22 NOTE — TELEPHONE ENCOUNTER
Rx Refill Note  Requested Prescriptions     Pending Prescriptions Disp Refills   • zolpidem (AMBIEN) 10 MG tablet [Pharmacy Med Name: ZOLPIDEM 10MG TABLETS] 30 tablet      Sig: TAKE 1 TABLET BY MOUTH AT NIGHT AS NEEDED FOR SLEEP      Last office visit with prescribing clinician: 10/13/2021      Next office visit with prescribing clinician: Visit date not found            Fidelia Orellana MA  02/22/22, 11:44 EST

## 2022-02-28 NOTE — TELEPHONE ENCOUNTER
Appt scheduled for 4/13/22... does pt need labs prior to this visit? She was telling me about labs she had done in the beginning of February, along with procedures, which I believe can be accessed through her chart.

## 2022-03-22 ENCOUNTER — TELEPHONE (OUTPATIENT)
Dept: FAMILY MEDICINE CLINIC | Facility: CLINIC | Age: 62
End: 2022-03-22

## 2022-03-22 DIAGNOSIS — E55.9 VITAMIN D DEFICIENCY: Primary | ICD-10-CM

## 2022-03-22 DIAGNOSIS — R79.89 LOW VITAMIN D LEVEL: ICD-10-CM

## 2022-03-22 DIAGNOSIS — G47.00 INSOMNIA, UNSPECIFIED TYPE: ICD-10-CM

## 2022-03-22 DIAGNOSIS — R79.89 ABNORMAL TSH: ICD-10-CM

## 2022-03-22 DIAGNOSIS — E78.2 MIXED HYPERLIPIDEMIA: ICD-10-CM

## 2022-03-22 DIAGNOSIS — Z00.00 PREVENTATIVE HEALTH CARE: ICD-10-CM

## 2022-03-22 NOTE — TELEPHONE ENCOUNTER
PATIENT CALLED TO SPEAK TO WILBUR RICHTER TO ADD SOME LAB ORDERS FROM ANOTHER DOCTOR    PLEASE ADVISE    [phone]

## 2022-03-24 ENCOUNTER — HOSPITAL ENCOUNTER (OUTPATIENT)
Dept: GENERAL RADIOLOGY | Facility: HOSPITAL | Age: 62
Discharge: HOME | End: 2022-03-24

## 2022-03-24 ENCOUNTER — LAB (OUTPATIENT)
Dept: LAB | Facility: HOSPITAL | Age: 62
End: 2022-03-24

## 2022-03-24 ENCOUNTER — TRANSCRIBE ORDERS (OUTPATIENT)
Dept: ADMINISTRATIVE | Facility: HOSPITAL | Age: 62
End: 2022-03-24

## 2022-03-24 ENCOUNTER — HOSPITAL ENCOUNTER (OUTPATIENT)
Dept: CARDIOLOGY | Facility: HOSPITAL | Age: 62
Discharge: HOME | End: 2022-03-24

## 2022-03-24 DIAGNOSIS — Z01.818 PRE-OP TESTING: Primary | ICD-10-CM

## 2022-03-24 DIAGNOSIS — E78.2 MIXED HYPERLIPIDEMIA: ICD-10-CM

## 2022-03-24 DIAGNOSIS — Z01.818 PRE-OP TESTING: ICD-10-CM

## 2022-03-24 LAB
ANION GAP SERPL CALCULATED.3IONS-SCNC: 10.2 MMOL/L (ref 5–15)
APTT PPP: 24.2 SECONDS (ref 24–31)
BASOPHILS # BLD AUTO: 0.06 10*3/MM3 (ref 0–0.2)
BASOPHILS NFR BLD AUTO: 1 % (ref 0–1.5)
BILIRUB UR QL STRIP: NEGATIVE
BUN SERPL-MCNC: 11 MG/DL (ref 8–23)
BUN/CREAT SERPL: 14.9 (ref 7–25)
CALCIUM SPEC-SCNC: 9.7 MG/DL (ref 8.6–10.5)
CHLORIDE SERPL-SCNC: 102 MMOL/L (ref 98–107)
CLARITY UR: CLEAR
CO2 SERPL-SCNC: 26.8 MMOL/L (ref 22–29)
COLOR UR: YELLOW
CREAT SERPL-MCNC: 0.74 MG/DL (ref 0.57–1)
DEPRECATED RDW RBC AUTO: 42.4 FL (ref 37–54)
EGFRCR SERPLBLD CKD-EPI 2021: 92.8 ML/MIN/1.73
EOSINOPHIL # BLD AUTO: 0.08 10*3/MM3 (ref 0–0.4)
EOSINOPHIL NFR BLD AUTO: 1.3 % (ref 0.3–6.2)
ERYTHROCYTE [DISTWIDTH] IN BLOOD BY AUTOMATED COUNT: 12.3 % (ref 12.3–15.4)
GLUCOSE SERPL-MCNC: 92 MG/DL (ref 65–99)
GLUCOSE UR STRIP-MCNC: NEGATIVE MG/DL
HCT VFR BLD AUTO: 38.6 % (ref 34–46.6)
HGB BLD-MCNC: 13.2 G/DL (ref 12–15.9)
HGB UR QL STRIP.AUTO: NEGATIVE
IMM GRANULOCYTES # BLD AUTO: 0.01 10*3/MM3 (ref 0–0.05)
IMM GRANULOCYTES NFR BLD AUTO: 0.2 % (ref 0–0.5)
INR PPP: <0.93 (ref 0.93–1.1)
KETONES UR QL STRIP: NEGATIVE
LEUKOCYTE ESTERASE UR QL STRIP.AUTO: NEGATIVE
LYMPHOCYTES # BLD AUTO: 2.1 10*3/MM3 (ref 0.7–3.1)
LYMPHOCYTES NFR BLD AUTO: 34.9 % (ref 19.6–45.3)
MCH RBC QN AUTO: 32.4 PG (ref 26.6–33)
MCHC RBC AUTO-ENTMCNC: 34.2 G/DL (ref 31.5–35.7)
MCV RBC AUTO: 94.6 FL (ref 79–97)
MONOCYTES # BLD AUTO: 0.67 10*3/MM3 (ref 0.1–0.9)
MONOCYTES NFR BLD AUTO: 11.1 % (ref 5–12)
NEUTROPHILS NFR BLD AUTO: 3.09 10*3/MM3 (ref 1.7–7)
NEUTROPHILS NFR BLD AUTO: 51.5 % (ref 42.7–76)
NITRITE UR QL STRIP: NEGATIVE
NRBC BLD AUTO-RTO: 0 /100 WBC (ref 0–0.2)
PH UR STRIP.AUTO: 7 [PH] (ref 5–8)
PLATELET # BLD AUTO: 276 10*3/MM3 (ref 140–450)
PMV BLD AUTO: 10.7 FL (ref 6–12)
POTASSIUM SERPL-SCNC: 4.1 MMOL/L (ref 3.5–5.2)
PROT UR QL STRIP: NEGATIVE
PROTHROMBIN TIME: 10.3 SECONDS (ref 9.6–11.7)
RBC # BLD AUTO: 4.08 10*6/MM3 (ref 3.77–5.28)
SODIUM SERPL-SCNC: 139 MMOL/L (ref 136–145)
SP GR UR STRIP: 1.01 (ref 1–1.03)
UROBILINOGEN UR QL STRIP: NORMAL
WBC NRBC COR # BLD: 6.01 10*3/MM3 (ref 3.4–10.8)

## 2022-03-24 PROCEDURE — 36415 COLL VENOUS BLD VENIPUNCTURE: CPT

## 2022-03-24 PROCEDURE — 81003 URINALYSIS AUTO W/O SCOPE: CPT

## 2022-03-24 PROCEDURE — 85730 THROMBOPLASTIN TIME PARTIAL: CPT

## 2022-03-24 PROCEDURE — 93010 ELECTROCARDIOGRAM REPORT: CPT | Performed by: INTERNAL MEDICINE

## 2022-03-24 PROCEDURE — 85025 COMPLETE CBC W/AUTO DIFF WBC: CPT

## 2022-03-24 PROCEDURE — 71046 X-RAY EXAM CHEST 2 VIEWS: CPT

## 2022-03-24 PROCEDURE — 80048 BASIC METABOLIC PNL TOTAL CA: CPT

## 2022-03-24 PROCEDURE — 85610 PROTHROMBIN TIME: CPT

## 2022-03-24 PROCEDURE — 93005 ELECTROCARDIOGRAM TRACING: CPT

## 2022-03-25 ENCOUNTER — LAB (OUTPATIENT)
Dept: LAB | Facility: HOSPITAL | Age: 62
End: 2022-03-25

## 2022-03-25 LAB — SARS-COV-2 ORF1AB RESP QL NAA+PROBE: NOT DETECTED

## 2022-03-25 PROCEDURE — C9803 HOPD COVID-19 SPEC COLLECT: HCPCS

## 2022-03-25 PROCEDURE — U0004 COV-19 TEST NON-CDC HGH THRU: HCPCS

## 2022-03-27 LAB — QT INTERVAL: 389 MS

## 2022-04-13 ENCOUNTER — TELEMEDICINE (OUTPATIENT)
Dept: FAMILY MEDICINE CLINIC | Facility: CLINIC | Age: 62
End: 2022-04-13

## 2022-04-13 VITALS
DIASTOLIC BLOOD PRESSURE: 66 MMHG | SYSTOLIC BLOOD PRESSURE: 115 MMHG | BODY MASS INDEX: 24.99 KG/M2 | HEIGHT: 65 IN | WEIGHT: 150 LBS

## 2022-04-13 DIAGNOSIS — Z78.0 POSTMENOPAUSAL: ICD-10-CM

## 2022-04-13 DIAGNOSIS — E55.9 VITAMIN D DEFICIENCY: ICD-10-CM

## 2022-04-13 DIAGNOSIS — Z96.89 SPINAL CORD STIMULATOR STATUS: ICD-10-CM

## 2022-04-13 DIAGNOSIS — G47.00 INSOMNIA, UNSPECIFIED TYPE: ICD-10-CM

## 2022-04-13 DIAGNOSIS — M25.562 CHRONIC PAIN OF LEFT KNEE: ICD-10-CM

## 2022-04-13 DIAGNOSIS — F41.9 ANXIETY: ICD-10-CM

## 2022-04-13 DIAGNOSIS — Z12.31 BREAST CANCER SCREENING BY MAMMOGRAM: ICD-10-CM

## 2022-04-13 DIAGNOSIS — G89.29 CHRONIC PAIN OF LEFT KNEE: ICD-10-CM

## 2022-04-13 DIAGNOSIS — E78.2 MIXED HYPERLIPIDEMIA: Primary | ICD-10-CM

## 2022-04-13 DIAGNOSIS — Z12.11 COLON CANCER SCREENING: ICD-10-CM

## 2022-04-13 PROCEDURE — 99214 OFFICE O/P EST MOD 30 MIN: CPT | Performed by: NURSE PRACTITIONER

## 2022-04-13 RX ORDER — ZOLPIDEM TARTRATE 10 MG/1
10 TABLET ORAL NIGHTLY PRN
Qty: 30 TABLET | Refills: 5 | Status: SHIPPED | OUTPATIENT
Start: 2022-04-13 | End: 2022-10-20

## 2022-04-13 RX ORDER — ESTRADIOL 1 MG/1
0.5 TABLET ORAL DAILY
Qty: 90 TABLET | Refills: 1 | Status: SHIPPED | OUTPATIENT
Start: 2022-04-13 | End: 2022-10-17

## 2022-04-13 RX ORDER — TRAMADOL HYDROCHLORIDE 50 MG/1
TABLET ORAL
COMMUNITY
Start: 2022-04-05

## 2022-04-13 RX ORDER — ERGOCALCIFEROL 1.25 MG/1
CAPSULE ORAL
Qty: 12 CAPSULE | Refills: 1 | Status: SHIPPED | OUTPATIENT
Start: 2022-04-13 | End: 2022-10-17

## 2022-04-13 RX ORDER — ESCITALOPRAM OXALATE 10 MG/1
10 TABLET ORAL DAILY
Qty: 90 TABLET | Refills: 1 | Status: SHIPPED | OUTPATIENT
Start: 2022-04-13 | End: 2022-08-15

## 2022-04-13 RX ORDER — ESTRADIOL 1 MG/1
1 TABLET ORAL DAILY
Qty: 90 TABLET | Refills: 1 | Status: SHIPPED | OUTPATIENT
Start: 2022-04-13 | End: 2022-04-13

## 2022-04-13 RX ORDER — GAUZE BANDAGE 2" X 2"
BANDAGE TOPICAL
COMMUNITY
Start: 2021-12-01

## 2022-04-13 RX ORDER — ATORVASTATIN CALCIUM 10 MG/1
10 TABLET, FILM COATED ORAL NIGHTLY
Qty: 90 TABLET | Refills: 1 | Status: SHIPPED | OUTPATIENT
Start: 2022-04-13 | End: 2022-11-01

## 2022-04-13 NOTE — PROGRESS NOTES
You have chosen to receive care through a telephone visit. Do you consent to use a telephone visit for your medical care today? Yes      Chief Complaint  Hyperlipidemia, Insomnia, Vitamin D Deficiency, Follow-up, and Results (3/22 labs)    Subjective          Denisse Flores presents to [facility] for   History of Present Illness       HTN, denies chest pain, headache, shortness of air, palpitations and swelling of extremities.      Hyperlipidemia, The patient denies muscle aches, constipation, diarrhea, GI upset, fatigue, chest pain/pressure, exercise intolerance, dyspnea, palpitations, syncope and pedal edema.       Insomnia, 2/2 pain, Belsomra ineffective, ambien increased to 10mg, she reports improvement in sleep since having spinal cord stimulator implanted.  She denies sleepwalking or eating with Ambien, needs medication refill today     Anxiety, patient denies significant weight loss/gain, hypersomnia, psychomotor agitation, psychomotor retardation, fatigue (loss of energy), feelings of worthlessness (guilt), impaired concentration (indecisiveness), thoughts of death or suicide.       Chronic left knee pain, with history of L total knee replacement, mini revision and joint injections per Dr. Priest, w/ severe poor mobility and pain she underwent SCS on 3/29/22 per Dr. Young, mobility is unchanged and will likely never change, but pain has improved tremendously. She is not in physical therapy at this time, on weight restrictions for now.      She follows with  for chronic Left hand pain s/p fall.       She reports overheating easily with activity, not so much hotflashes, now on 0.5mg estradiol instead of 1 mg but has not been able to gauge d/t minimal activity recently.      Here to review labs collected 3/24 for preop purpose.  Vitamin D, B12, lipid panel were also to be collected at that time but were not.         The following portions of the patient's history were  reviewed and updated as appropriate: allergies, current medications, past family history, past medical history, past social history, past surgical history and problem list.    Past Medical History:   Diagnosis Date   • Allergic since childhood   • Anxiety    • Arthritis    • Cancer (HCC)    • Environmental allergies    • High cholesterol    • History of panic attacks    • History of skin cancer 1970'S   • History of transfusion    • HL (hearing loss)    • Insomnia    • Knee pain    • Migraine    • Urinary tract infection    • Vitamin D deficiency      Past Surgical History:   Procedure Laterality Date   •  SECTION      X 3   • CHOLECYSTECTOMY     • DILATATION AND CURETTAGE     • HYSTERECTOMY     • JOINT MANIPULATION Left 10/22/2019    Procedure: MANIPULATION OF KNEE with steriod injection;  Surgeon: Pedro Priest MD;  Location: [facility];  Service: Orthopedics   • JOINT REPLACEMENT     • KNEE MINI REVISION Left 8/15/2019    Procedure: LEFT TOTAL KNEE ARTHROPLASTY REVISION POLY CHANGE;  Surgeon: Pedro Priest MD;  Location: [facility] OR;  Service: Orthopedics   • [place] TOTAL KNEE ARTHROPLASTY Left 2017    Procedure: LT TOTAL KNEE ARTHROPLASTY;  Surgeon: Pedro Priest MD;  Location: [facility];  Service: Orthopedics     Family History   Problem Relation Age of Onset   • Migraines Mother    • Diabetes Maternal Grandmother      Social History     Tobacco Use   • Smoking status: Never Smoker   • Smokeless tobacco: Never Used   Substance Use Topics   • Alcohol use: Yes     Alcohol/week: 0.0 standard drinks     Comment: ABOUT 2X YEARLY       Current Outpatient Medications:   •  atorvastatin (LIPITOR) 10 MG tablet, Take 1 tablet by mouth Every Night., Disp: 90 tablet, Rfl: 1  •  B Complex-C-Iron (Super B-Complex/Iron/Vitamin C) tablet, , Disp: , Rfl:   •  escitalopram (LEXAPRO) 10 MG tablet, Take 1 tablet by mouth Daily., Disp: 90 tablet, Rfl: 1  •  estradiol (ESTRACE) 1 MG tablet, Take 0.5  "tablets by mouth Daily., Disp: 90 tablet, Rfl: 1  •  Omega-3 Fatty Acids (FISH OIL) 1000 MG capsule capsule, Take 1,000 mg by mouth 2 (Two) Times a Day With Meals. HOLDING FOR SURGERY, Disp: , Rfl:   •  traMADol (ULTRAM) 50 MG tablet, TAKE 2 TABLETS BY MOUTH THREE TIMES DAILY AS NEEDED FOR PAIN, Disp: , Rfl:   •  vitamin C (ASCORBIC ACID) 500 MG tablet, Take 500 mg by mouth Daily., Disp: , Rfl:   •  vitamin D (ERGOCALCIFEROL) 1.25 MG (26385 UT) capsule capsule, TAKE 1 CAPSULE BY MOUTH ONCE WEEKLY ON MONDAYS, Disp: 12 capsule, Rfl: 1  •  zolpidem (AMBIEN) 10 MG tablet, Take 1 tablet by mouth At Night As Needed for Sleep., Disp: 30 tablet, Rfl: 5    Objective   Vital Signs:   /66 (BP Location: Left arm, Patient Position: Sitting, Cuff Size: Adult)   Ht 165.1 cm (65\")   Wt 68 kg (150 lb)   BMI 24.96 kg/m²       Physical Exam  Constitutional:       General: She is not in acute distress.     Comments: Exam otherwise deferred through video/audio visit   Pulmonary:      Effort: Pulmonary effort is normal.   Neurological:      Mental Status: She is alert and oriented to person, place, and time.   Psychiatric:         Behavior: Behavior normal.         Thought Content: Thought content normal.         Judgment: Judgment normal.          Result Review :     No visits with results within 7 Day(s) from this visit.   Latest known visit with results is:   Lab on 03/24/2022   Component Date Value Ref Range Status   • Protime 03/24/2022 10.3  9.6 - 11.7 Seconds Final   • INR 03/24/2022 <0.93 (A) 0.93 - 1.10 Final   • PTT 03/24/2022 24.2  24.0 - 31.0 seconds Final   • Color, UA 03/24/2022 Yellow  Yellow, Straw Final   • Appearance, UA 03/24/2022 Clear  Clear Final   • pH, UA 03/24/2022 7.0  5.0 - 8.0 Final   • Specific Gravity, UA 03/24/2022 1.006  1.005 - 1.030 Final   • Glucose, UA 03/24/2022 Negative  Negative Final   • Ketones, UA 03/24/2022 Negative  Negative Final   • Bilirubin, UA 03/24/2022 Negative  Negative Final "   • Blood, UA 03/24/2022 Negative  Negative Final   • Protein, UA 03/24/2022 Negative  Negative Final   • Leuk Esterase, UA 03/24/2022 Negative  Negative Final   • Nitrite, UA 03/24/2022 Negative  Negative Final   • Urobilinogen, UA 03/24/2022 0.2 E.U./dL  0.2 - 1.0 E.U./dL Final   • Glucose 03/24/2022 92  65 - 99 mg/dL Final   • BUN 03/24/2022 11  8 - 23 mg/dL Final   • Creatinine 03/24/2022 0.74  0.57 - 1.00 mg/dL Final   • Sodium 03/24/2022 139  136 - 145 mmol/L Final   • Potassium 03/24/2022 4.1  3.5 - 5.2 mmol/L Final   • Chloride 03/24/2022 102  98 - 107 mmol/L Final   • CO2 03/24/2022 26.8  22.0 - 29.0 mmol/L Final   • Calcium 03/24/2022 9.7  8.6 - 10.5 mg/dL Final   • BUN/Creatinine Ratio 03/24/2022 14.9  7.0 - 25.0 Final   • Anion Gap 03/24/2022 10.2  5.0 - 15.0 mmol/L Final   • eGFR 03/24/2022 92.8  >60.0 mL/min/1.73 Final    National Kidney Foundation and American Society of Nephrology (ASN) Task Force recommended calculation based on the Chronic Kidney Disease Epidemiology Collaboration (CKD-EPI) equation refit without adjustment for race.   • WBC 03/24/2022 6.01  3.40 - 10.80 10*3/mm3 Final   • RBC 03/24/2022 4.08  3.77 - 5.28 10*6/mm3 Final   • Hemoglobin 03/24/2022 13.2  12.0 - 15.9 g/dL Final   • Hematocrit 03/24/2022 38.6  34.0 - 46.6 % Final   • MCV 03/24/2022 94.6  79.0 - 97.0 fL Final   • MCH 03/24/2022 32.4  26.6 - 33.0 pg Final   • MCHC 03/24/2022 34.2  31.5 - 35.7 g/dL Final   • RDW 03/24/2022 12.3  12.3 - 15.4 % Final   • RDW-SD 03/24/2022 42.4  37.0 - 54.0 fl Final   • MPV 03/24/2022 10.7  6.0 - 12.0 fL Final   • Platelets 03/24/2022 276  140 - 450 10*3/mm3 Final   • Neutrophil % 03/24/2022 51.5  42.7 - 76.0 % Final   • Lymphocyte % 03/24/2022 34.9  19.6 - 45.3 % Final   • Monocyte % 03/24/2022 11.1  5.0 - 12.0 % Final   • Eosinophil % 03/24/2022 1.3  0.3 - 6.2 % Final   • Basophil % 03/24/2022 1.0  0.0 - 1.5 % Final   • Immature Grans % 03/24/2022 0.2  0.0 - 0.5 % Final   • Neutrophils,  Absolute 03/24/2022 3.09  1.70 - 7.00 10*3/mm3 Final   • Lymphocytes, Absolute 03/24/2022 2.10  0.70 - 3.10 10*3/mm3 Final   • Monocytes, Absolute 03/24/2022 0.67  0.10 - 0.90 10*3/mm3 Final   • Eosinophils, Absolute 03/24/2022 0.08  0.00 - 0.40 10*3/mm3 Final   • Basophils, Absolute 03/24/2022 0.06  0.00 - 0.20 10*3/mm3 Final   • Immature Grans, Absolute 03/24/2022 0.01  0.00 - 0.05 10*3/mm3 Final   • nRBC 03/24/2022 0.0  0.0 - 0.2 /100 WBC Final   • COVID19 03/25/2022 Not Detected  Not Detected - Ref. Range Final                       Assessment and Plan    Diagnoses and all orders for this visit:    1. Mixed hyperlipidemia (Primary)  Comments:  cont atorvastatin, pt to come to office next week for fasting labs that are ordered, but did not get collected with preop labs.   Orders:  -     atorvastatin (LIPITOR) 10 MG tablet; Take 1 tablet by mouth Every Night.  Dispense: 90 tablet; Refill: 1    2. Vitamin D deficiency  Comments:  cont/rf weekly vit D, check level  Orders:  -     vitamin D (ERGOCALCIFEROL) 1.25 MG (81663 UT) capsule capsule; TAKE 1 CAPSULE BY MOUTH ONCE WEEKLY ON MONDAYS  Dispense: 12 capsule; Refill: 1    3. Postmenopausal  Comments:  cont estradiol.   Orders:  -     Discontinue: estradiol (ESTRACE) 1 MG tablet; Take 1 tablet by mouth Daily.  Dispense: 90 tablet; Refill: 1  -     estradiol (ESTRACE) 1 MG tablet; Take 0.5 tablets by mouth Daily.  Dispense: 90 tablet; Refill: 1    4. Anxiety  Comments:  Stable  Orders:  -     escitalopram (LEXAPRO) 10 MG tablet; Take 1 tablet by mouth Daily.  Dispense: 90 tablet; Refill: 1    5. Insomnia, unspecified type  Comments:  belsomra ineffective. rec cont ambien 10mg nightly, refill today.   Orders:  -     zolpidem (AMBIEN) 10 MG tablet; Take 1 tablet by mouth At Night As Needed for Sleep.  Dispense: 30 tablet; Refill: 5    6. Breast cancer screening by mammogram  -     Mammo Screening Digital Tomosynthesis Bilateral With CAD; Future    7. Colon cancer  screening  -     Cologuard - Stool, Per Rectum; Future    8. Spinal cord stimulator status  -     CBC (No Diff); Future    9. Chronic pain of left knee  Comments:  Spinal cord stimulator effective so far with alleviating pain, f/u ortho and pain mgmt as directed        I spent 35 minutes caring for Denisse Flores on this date of service. This time includes time spent by me in the following activities: preparing for the visit, reviewing tests, performing a medically appropriate examination and/or evaluation , counseling and educating the patient/family/caregiver, ordering medications, tests, or procedures and documenting information in the medical record        Follow Up     Return in about 6 months (around 10/13/2022) for Recheck. pt will come to office nxt wk for vit d, b12, cbc, lipid panel and TSH fasting.  Patient was given instructions and counseling regarding her condition or for health maintenance advice. Please see specific information pulled into the AVS if appropriate.        Part of this note may be an electronic transcription/translation of spoken language to printed text using the Dragon Dictation System    This was an audio and video enabled telemedicine encounter.  Total time of discussion was 28 minutes

## 2022-04-19 ENCOUNTER — HOSPITAL ENCOUNTER (OUTPATIENT)
Dept: MAMMOGRAPHY | Facility: HOSPITAL | Age: 62
Discharge: HOME | End: 2022-04-19
Admitting: NURSE PRACTITIONER

## 2022-04-19 ENCOUNTER — APPOINTMENT (OUTPATIENT)
Dept: OTHER | Facility: HOSPITAL | Age: 62
End: 2022-04-19

## 2022-04-19 DIAGNOSIS — Z09 FOLLOW-UP EXAM: ICD-10-CM

## 2022-04-19 DIAGNOSIS — Z12.31 BREAST CANCER SCREENING BY MAMMOGRAM: ICD-10-CM

## 2022-04-19 PROCEDURE — 77067 SCR MAMMO BI INCL CAD: CPT

## 2022-04-19 PROCEDURE — 77063 BREAST TOMOSYNTHESIS BI: CPT

## 2022-04-27 ENCOUNTER — TELEPHONE (OUTPATIENT)
Dept: FAMILY MEDICINE CLINIC | Facility: CLINIC | Age: 62
End: 2022-04-27

## 2022-04-27 NOTE — TELEPHONE ENCOUNTER
Left message to remind pt that she is due for lab work. Advised that she contact the office to get scheduled.

## 2022-05-20 DIAGNOSIS — E78.2 MIXED HYPERLIPIDEMIA: ICD-10-CM

## 2022-05-20 RX ORDER — ATORVASTATIN CALCIUM 10 MG/1
10 TABLET, FILM COATED ORAL NIGHTLY
Qty: 90 TABLET | Refills: 1 | OUTPATIENT
Start: 2022-05-20

## 2022-06-01 ENCOUNTER — LAB (OUTPATIENT)
Dept: FAMILY MEDICINE CLINIC | Facility: CLINIC | Age: 62
End: 2022-06-01

## 2022-06-01 DIAGNOSIS — E78.2 MIXED HYPERLIPIDEMIA: ICD-10-CM

## 2022-06-01 DIAGNOSIS — E55.9 VITAMIN D DEFICIENCY: Primary | ICD-10-CM

## 2022-06-01 DIAGNOSIS — Z96.89 SPINAL CORD STIMULATOR STATUS: ICD-10-CM

## 2022-06-01 DIAGNOSIS — F41.9 ANXIETY: ICD-10-CM

## 2022-06-01 DIAGNOSIS — E55.9 VITAMIN D DEFICIENCY: ICD-10-CM

## 2022-06-01 DIAGNOSIS — R79.89 LOW VITAMIN D LEVEL: ICD-10-CM

## 2022-06-01 DIAGNOSIS — R79.89 ABNORMAL TSH: ICD-10-CM

## 2022-06-01 PROCEDURE — 82607 VITAMIN B-12: CPT | Performed by: NURSE PRACTITIONER

## 2022-06-01 PROCEDURE — 82306 VITAMIN D 25 HYDROXY: CPT | Performed by: NURSE PRACTITIONER

## 2022-06-01 PROCEDURE — 84443 ASSAY THYROID STIM HORMONE: CPT | Performed by: NURSE PRACTITIONER

## 2022-06-01 PROCEDURE — 80061 LIPID PANEL: CPT | Performed by: NURSE PRACTITIONER

## 2022-06-01 PROCEDURE — 85027 COMPLETE CBC AUTOMATED: CPT | Performed by: NURSE PRACTITIONER

## 2022-06-02 LAB
25(OH)D3 SERPL-MCNC: 30.1 NG/ML (ref 30–100)
CHOLEST SERPL-MCNC: 188 MG/DL (ref 0–200)
DEPRECATED RDW RBC AUTO: 43.3 FL (ref 37–54)
ERYTHROCYTE [DISTWIDTH] IN BLOOD BY AUTOMATED COUNT: 12.3 % (ref 12.3–15.4)
HCT VFR BLD AUTO: 39.4 % (ref 34–46.6)
HDLC SERPL-MCNC: 53 MG/DL (ref 40–60)
HGB BLD-MCNC: 13.4 G/DL (ref 12–15.9)
LDLC SERPL CALC-MCNC: 106 MG/DL (ref 0–100)
LDLC/HDLC SERPL: 1.91 {RATIO}
MCH RBC QN AUTO: 32.5 PG (ref 26.6–33)
MCHC RBC AUTO-ENTMCNC: 34 G/DL (ref 31.5–35.7)
MCV RBC AUTO: 95.6 FL (ref 79–97)
PLATELET # BLD AUTO: 287 10*3/MM3 (ref 140–450)
PMV BLD AUTO: 10.5 FL (ref 6–12)
RBC # BLD AUTO: 4.12 10*6/MM3 (ref 3.77–5.28)
TRIGL SERPL-MCNC: 170 MG/DL (ref 0–150)
TSH SERPL DL<=0.05 MIU/L-ACNC: 3.43 UIU/ML (ref 0.27–4.2)
VIT B12 BLD-MCNC: 419 PG/ML (ref 211–946)
VLDLC SERPL-MCNC: 29 MG/DL (ref 5–40)
WBC NRBC COR # BLD: 6.72 10*3/MM3 (ref 3.4–10.8)

## 2022-06-30 ENCOUNTER — TELEPHONE (OUTPATIENT)
Dept: FAMILY MEDICINE CLINIC | Facility: CLINIC | Age: 62
End: 2022-06-30

## 2022-06-30 RX ORDER — METHYLPREDNISOLONE 4 MG/1
TABLET ORAL
Qty: 21 TABLET | Refills: 0 | Status: SHIPPED | OUTPATIENT
Start: 2022-06-30 | End: 2022-11-01

## 2022-06-30 RX ORDER — AZITHROMYCIN 250 MG/1
TABLET, FILM COATED ORAL
Qty: 6 TABLET | Refills: 0 | Status: SHIPPED | OUTPATIENT
Start: 2022-06-30 | End: 2022-11-01

## 2022-06-30 NOTE — TELEPHONE ENCOUNTER
Caller: Denisse Flores    Relationship to patient: Self    Best call back number: [phone]    Date of exposure: END OF LAST WEEK    Date of positive COVID19 test: 6/28/22    Date if possible COVID19 exposure: N/A    COVID19 symptoms: STARTED ON THIS PAST Sunday. SORE THROAT, EAR PAIN, SINUS PRESSURE, PAIN WITH COUGHING, BODY ACHES, HEAD ACHES, FEVER     Date of initial quarantine: LAST Friday     Additional information or concerns: DIAGNOSED AT , THEY OFFERED INFUSIONS BUT PATIENT DECLINED. WOULD LIKE TO KNOW WHAT ELSE SHE CAN DO. IS CURRENTLY TAKING IBUPROFEN AND TYLENOL     What is the patients preferred [id number] [address] AT [facility] [phone] [facility] [phone]

## 2022-06-30 NOTE — TELEPHONE ENCOUNTER
Pt isn't wheezing, cough is dry, and denies SOA.  She said she didn't want to try the antiviral infusion, but I mentioned paxlovid to her and she said she would check it out.  She said she's hesitant to try anything that isn't approved by the FDA, do you know if it is?  Another pt said when she picked up the medication, it said it's an experimental drug.  Pt is unable to check her temperature, but reports that she feels like she has a fever, is alternating tylenol and ibuprofen, taking dayquil/nyquil.

## 2022-06-30 NOTE — TELEPHONE ENCOUNTER
Paxlovid is an experimental drug. Recommend treat the symptoms with Mucinex DM or DayQuil/NyQuil, Tylenol/ibuprofen as needed for body aches or fever and push fluids. I can send a steroid Dosepak and an antibiotic if her symptoms worsen over the weekend. The medications are not warranted at this time as this is viral and s/s started in the last 2 days. If at day 5 she still has s/s can start meds.

## 2022-08-15 DIAGNOSIS — F41.9 ANXIETY: ICD-10-CM

## 2022-08-15 RX ORDER — ESCITALOPRAM OXALATE 10 MG/1
10 TABLET ORAL DAILY
Qty: 90 TABLET | Refills: 0 | Status: SHIPPED | OUTPATIENT
Start: 2022-08-15 | End: 2022-11-01 | Stop reason: SDUPTHER

## 2022-10-15 DIAGNOSIS — Z78.0 POSTMENOPAUSAL: ICD-10-CM

## 2022-10-15 DIAGNOSIS — E55.9 VITAMIN D DEFICIENCY: ICD-10-CM

## 2022-10-17 RX ORDER — ERGOCALCIFEROL 1.25 MG/1
CAPSULE ORAL
Qty: 12 CAPSULE | Refills: 1 | Status: SHIPPED | OUTPATIENT
Start: 2022-10-17

## 2022-10-17 RX ORDER — ESTRADIOL 1 MG/1
TABLET ORAL
Qty: 90 TABLET | Refills: 1 | Status: SHIPPED | OUTPATIENT
Start: 2022-10-17

## 2022-10-20 DIAGNOSIS — G47.00 INSOMNIA, UNSPECIFIED TYPE: ICD-10-CM

## 2022-10-20 RX ORDER — ZOLPIDEM TARTRATE 10 MG/1
10 TABLET ORAL NIGHTLY PRN
Qty: 30 TABLET | Refills: 0 | Status: SHIPPED | OUTPATIENT
Start: 2022-10-20 | End: 2022-11-01 | Stop reason: SDUPTHER

## 2022-10-26 ENCOUNTER — CLINICAL SUPPORT (OUTPATIENT)
Dept: FAMILY MEDICINE CLINIC | Facility: CLINIC | Age: 62
End: 2022-10-26

## 2022-10-26 DIAGNOSIS — E53.8 VITAMIN B12 DEFICIENCY: ICD-10-CM

## 2022-10-26 DIAGNOSIS — E55.9 VITAMIN D DEFICIENCY: Primary | ICD-10-CM

## 2022-10-26 DIAGNOSIS — E78.2 MIXED HYPERLIPIDEMIA: ICD-10-CM

## 2022-10-26 DIAGNOSIS — R79.89 ABNORMAL TSH: ICD-10-CM

## 2022-10-26 DIAGNOSIS — E78.00 HYPERCHOLESTEREMIA: ICD-10-CM

## 2022-10-26 DIAGNOSIS — R79.89 LOW VITAMIN D LEVEL: ICD-10-CM

## 2022-10-26 PROCEDURE — 80061 LIPID PANEL: CPT | Performed by: NURSE PRACTITIONER

## 2022-10-26 PROCEDURE — 82306 VITAMIN D 25 HYDROXY: CPT | Performed by: NURSE PRACTITIONER

## 2022-10-26 PROCEDURE — 85027 COMPLETE CBC AUTOMATED: CPT | Performed by: NURSE PRACTITIONER

## 2022-10-26 PROCEDURE — 84443 ASSAY THYROID STIM HORMONE: CPT | Performed by: NURSE PRACTITIONER

## 2022-10-26 PROCEDURE — 82607 VITAMIN B-12: CPT | Performed by: NURSE PRACTITIONER

## 2022-10-26 PROCEDURE — 36415 COLL VENOUS BLD VENIPUNCTURE: CPT | Performed by: NURSE PRACTITIONER

## 2022-10-26 NOTE — PROGRESS NOTES
Venipuncture Blood Specimen Collection  Venipuncture performed in the right arm by Fatou Bryan MA with good hemostasis. Patient tolerated the procedure well without complications.   10/26/22   Fatou Bryan MA

## 2022-10-27 LAB
25(OH)D3 SERPL-MCNC: 35.6 NG/ML (ref 30–100)
CHOLEST SERPL-MCNC: 177 MG/DL (ref 0–200)
DEPRECATED RDW RBC AUTO: 41.2 FL (ref 37–54)
ERYTHROCYTE [DISTWIDTH] IN BLOOD BY AUTOMATED COUNT: 11.9 % (ref 12.3–15.4)
HCT VFR BLD AUTO: 40.8 % (ref 34–46.6)
HDLC SERPL-MCNC: 58 MG/DL (ref 40–60)
HGB BLD-MCNC: 13.8 G/DL (ref 12–15.9)
LDLC SERPL CALC-MCNC: 94 MG/DL (ref 0–100)
LDLC/HDLC SERPL: 1.56 {RATIO}
MCH RBC QN AUTO: 31.8 PG (ref 26.6–33)
MCHC RBC AUTO-ENTMCNC: 33.8 G/DL (ref 31.5–35.7)
MCV RBC AUTO: 94 FL (ref 79–97)
PLATELET # BLD AUTO: 289 10*3/MM3 (ref 140–450)
PMV BLD AUTO: 11 FL (ref 6–12)
RBC # BLD AUTO: 4.34 10*6/MM3 (ref 3.77–5.28)
TRIGL SERPL-MCNC: 142 MG/DL (ref 0–150)
TSH SERPL DL<=0.05 MIU/L-ACNC: 3.47 UIU/ML (ref 0.27–4.2)
VIT B12 BLD-MCNC: 289 PG/ML (ref 211–946)
VLDLC SERPL-MCNC: 25 MG/DL (ref 5–40)
WBC NRBC COR # BLD: 6.63 10*3/MM3 (ref 3.4–10.8)

## 2022-11-01 ENCOUNTER — OFFICE VISIT (OUTPATIENT)
Dept: FAMILY MEDICINE CLINIC | Facility: CLINIC | Age: 62
End: 2022-11-01

## 2022-11-01 VITALS
BODY MASS INDEX: 25.76 KG/M2 | HEIGHT: 65 IN | DIASTOLIC BLOOD PRESSURE: 78 MMHG | HEART RATE: 92 BPM | OXYGEN SATURATION: 95 % | WEIGHT: 154.6 LBS | SYSTOLIC BLOOD PRESSURE: 116 MMHG | TEMPERATURE: 98.6 F

## 2022-11-01 DIAGNOSIS — D49.2 ABNORMAL SKIN GROWTH: ICD-10-CM

## 2022-11-01 DIAGNOSIS — F41.9 ANXIETY: ICD-10-CM

## 2022-11-01 DIAGNOSIS — E78.2 MIXED HYPERLIPIDEMIA: ICD-10-CM

## 2022-11-01 DIAGNOSIS — Z00.00 PREVENTATIVE HEALTH CARE: ICD-10-CM

## 2022-11-01 DIAGNOSIS — Z96.89 SPINAL CORD STIMULATOR STATUS: ICD-10-CM

## 2022-11-01 DIAGNOSIS — G47.00 INSOMNIA, UNSPECIFIED TYPE: ICD-10-CM

## 2022-11-01 DIAGNOSIS — E55.9 VITAMIN D DEFICIENCY: ICD-10-CM

## 2022-11-01 DIAGNOSIS — G89.29 CHRONIC PAIN OF LEFT KNEE: Primary | ICD-10-CM

## 2022-11-01 DIAGNOSIS — G90.50 COMPLEX REGIONAL PAIN SYNDROME TYPE 1, AFFECTING UNSPECIFIED SITE: ICD-10-CM

## 2022-11-01 DIAGNOSIS — E53.8 VITAMIN B12 DEFICIENCY: ICD-10-CM

## 2022-11-01 DIAGNOSIS — M25.562 CHRONIC PAIN OF LEFT KNEE: Primary | ICD-10-CM

## 2022-11-01 DIAGNOSIS — Z78.0 POSTMENOPAUSAL: ICD-10-CM

## 2022-11-01 PROCEDURE — 99214 OFFICE O/P EST MOD 30 MIN: CPT | Performed by: NURSE PRACTITIONER

## 2022-11-01 RX ORDER — ZOLPIDEM TARTRATE 10 MG/1
10 TABLET ORAL NIGHTLY PRN
Qty: 30 TABLET | Refills: 5 | Status: SHIPPED | OUTPATIENT
Start: 2022-11-01

## 2022-11-01 RX ORDER — ESCITALOPRAM OXALATE 10 MG/1
10 TABLET ORAL DAILY
Qty: 90 TABLET | Refills: 1 | Status: SHIPPED | OUTPATIENT
Start: 2022-11-01

## 2022-11-01 NOTE — PROGRESS NOTES
Chief Complaint  Chief Complaint   Patient presents with   • Hypertension     6 MO F/U.  Pt does not monitor BP at home.             Subjective          Denisse Flores presents to [facility] for   History of Present Illness     HTN, denies chest pain, headache, shortness of air, palpitations and swelling of extremities.      Hyperlipidemia, pt has been taking 5mg atorvastatin daily, eating healthy. The patient denies muscle aches, constipation, diarrhea, GI upset, fatigue, chest pain/pressure, exercise intolerance, dyspnea, palpitations, syncope and pedal edema.       Insomnia, 2/2 chronic pain, ambien increased to 10mg, effective some nights, She denies sleepwalking or eating with Ambien, needs medication refill today     Anxiety, patient denies significant weight loss/gain, hypersomnia, psychomotor agitation, psychomotor retardation, fatigue (loss of energy), feelings of worthlessness (guilt), impaired concentration (indecisiveness), thoughts of death or suicide.       Chronic left knee pain, with history of L total knee replacement, mini revision and joint injections per Dr. Priest in past but not seeing him currently. Still w/ severe poor mobility and pain of the left thigh/knee. She underwent SCS on 3/29/22 per Dr. Young, mobility of the knee is unchanged and pain has not improved, the device is close to the spine and very uncomfortable.   Spinal cord stimulator device was turned off last week for 2 week trial of pain. She is using tramadol 2.5 tabs per day. She is not in physical therapy at this time. She lives with pain 24/7, continues to try to stay active and do normal activities as the pain does not change one way or the other.     She follows with [facility] for chronic Left hand pain s/p fall, developed CRPS     She hasn't made it to derm for right lower jaw facial lesion, reports development of a new lesion on the forehead.  She was referred in 2020 to Dr. Kohler, requesting a  new referral    cologuard reordered in April, not done.    Here to review labs       The following portions of the patient's history were reviewed and updated as appropriate: allergies, current medications, past family history, past medical history, past social history, past surgical history and problem list.    Past Medical History:   Diagnosis Date   • Allergic since childhood   • Anxiety    • Arthritis    • Cancer (HCC)    • Environmental allergies    • High cholesterol    • History of panic attacks    • History of skin cancer 1970'S   • History of transfusion    • HL (hearing loss)    • Insomnia    • Knee pain    • Migraine    • Urinary tract infection    • Vitamin D deficiency      Past Surgical History:   Procedure Laterality Date   •  SECTION      X 3   • CHOLECYSTECTOMY     • DILATATION AND CURETTAGE     • HYSTERECTOMY     • JOINT MANIPULATION Left 10/22/2019    Procedure: MANIPULATION OF KNEE with steriod injection;  Surgeon: Pedro Priest MD;  Location: [facility];  Service: Orthopedics   • JOINT REPLACEMENT     • KNEE MINI REVISION Left 8/15/2019    Procedure: LEFT TOTAL KNEE ARTHROPLASTY REVISION POLY CHANGE;  Surgeon: Pedro Priest MD;  Location: [facility] OR;  Service: Orthopedics   • DE TOTAL KNEE ARTHROPLASTY Left 2017    Procedure: LT TOTAL KNEE ARTHROPLASTY;  Surgeon: Pedro Priest MD;  Location: [facility];  Service: Orthopedics     Family History   Problem Relation Age of Onset   • Migraines Mother    • Diabetes Maternal Grandmother      Social History     Tobacco Use   • Smoking status: Never   • Smokeless tobacco: Never   Substance Use Topics   • Alcohol use: Yes     Alcohol/week: 0.0 standard drinks     Comment: ABOUT 2X YEARLY       Current Outpatient Medications:   •  escitalopram (LEXAPRO) 10 MG tablet, Take 1 tablet by mouth Daily., Disp: 90 tablet, Rfl: 1  •  estradiol (ESTRACE) 1 MG tablet, TAKE 1 TABLET BY MOUTH DAILY, Disp: 90 tablet, Rfl: 1  •   "Omega-3 Fatty Acids (FISH OIL) 1000 MG capsule capsule, Take 1,000 mg by mouth 2 (Two) Times a Day With Meals. HOLDING FOR SURGERY, Disp: , Rfl:   •  traMADol (ULTRAM) 50 MG tablet, TAKE 2 TABLETS BY MOUTH THREE TIMES DAILY AS NEEDED FOR PAIN, Disp: , Rfl:   •  vitamin D (ERGOCALCIFEROL) 1.25 MG (04135 UT) capsule capsule, TAKE ONE CAPSULE BY MOUTH ONCE WEEKLY ON MONDAYS, Disp: 12 capsule, Rfl: 1  •  zolpidem (AMBIEN) 10 MG tablet, Take 1 tablet by mouth At Night As Needed for Sleep., Disp: 30 tablet, Rfl: 5  •  B Complex-C-Iron (Super B-Complex/Iron/Vitamin C) tablet, , Disp: , Rfl:   •  vitamin C (ASCORBIC ACID) 500 MG tablet, Take 500 mg by mouth Daily., Disp: , Rfl:     Objective   Vital Signs:   /78 (BP Location: Left arm, Patient Position: Sitting, Cuff Size: Adult)   Pulse 92   Temp 98.6 °F (37 °C) (Temporal)   Ht 165.1 cm (65\")   Wt 70.1 kg (154 lb 9.6 oz)   SpO2 95%   BMI 25.73 kg/m²           Physical Exam  Vitals and nursing note reviewed.   Constitutional:       General: She is not in acute distress.     Appearance: She is well-developed. She is not diaphoretic.   Eyes:      Pupils: Pupils are equal, round, and reactive to light.   Neck:      Thyroid: No thyromegaly.      Vascular: No JVD.   Cardiovascular:      Rate and Rhythm: Normal rate and regular rhythm.      Heart sounds: Normal heart sounds. No murmur heard.  Pulmonary:      Effort: Pulmonary effort is normal. No respiratory distress.      Breath sounds: Normal breath sounds.   Abdominal:      General: Bowel sounds are normal. There is no distension.      Palpations: Abdomen is soft.      Tenderness: There is no abdominal tenderness.   Musculoskeletal:         General: Tenderness (chronic left knee/leg pain, severe banerjee mobility and range of motion.  Spinal cord stimulator present right low back, palpable and tender) present. Normal range of motion.      Cervical back: Normal range of motion and neck supple.   Skin:     General: Skin " is warm and dry.   Neurological:      General: No focal deficit present.      Mental Status: She is alert and oriented to person, place, and time. Mental status is at baseline.      Sensory: Sensory deficit (LLE) present.      Gait: Gait abnormal (Due to chronic left knee/leg pain).   Psychiatric:         Attention and Perception: Attention normal.         Mood and Affect: Mood is anxious and depressed.         Behavior: Behavior normal. Behavior is cooperative.         Thought Content: Thought content normal.         Judgment: Judgment normal.          Result Review :     Clinical Support on 10/26/2022   Component Date Value Ref Range Status   • 25 Hydroxy, Vitamin D 10/26/2022 35.6  30.0 - 100.0 ng/ml Final   • Vitamin B-12 10/26/2022 289  211 - 946 pg/mL Final   • WBC 10/26/2022 6.63  3.40 - 10.80 10*3/mm3 Final   • RBC 10/26/2022 4.34  3.77 - 5.28 10*6/mm3 Final   • Hemoglobin 10/26/2022 13.8  12.0 - 15.9 g/dL Final   • Hematocrit 10/26/2022 40.8  34.0 - 46.6 % Final   • MCV 10/26/2022 94.0  79.0 - 97.0 fL Final   • MCH 10/26/2022 31.8  26.6 - 33.0 pg Final   • MCHC 10/26/2022 33.8  31.5 - 35.7 g/dL Final   • RDW 10/26/2022 11.9 (L)  12.3 - 15.4 % Final   • RDW-SD 10/26/2022 41.2  37.0 - 54.0 fl Final   • MPV 10/26/2022 11.0  6.0 - 12.0 fL Final   • Platelets 10/26/2022 289  140 - 450 10*3/mm3 Final   • Total Cholesterol 10/26/2022 177  0 - 200 mg/dL Final   • Triglycerides 10/26/2022 142  0 - 150 mg/dL Final   • HDL Cholesterol 10/26/2022 58  40 - 60 mg/dL Final   • LDL Cholesterol  10/26/2022 94  0 - 100 mg/dL Final   • VLDL Cholesterol 10/26/2022 25  5 - 40 mg/dL Final   • LDL/HDL Ratio 10/26/2022 1.56   Final   • TSH 10/26/2022 3.470  0.270 - 4.200 uIU/mL Final                  BMI is >= 25 and <30. (Overweight) The following options were offered after discussion;: exercise counseling/recommendations and nutrition counseling/recommendations           Assessment and Plan    Diagnoses and all orders for this  visit:    1. Chronic pain of left knee (Primary)  Comments:  Continue with Dr. Hopkins, pain stimulator ineffective and causing more pain, consider removal  see Dr. Priest prn    2. Complex regional pain syndrome type 1, affecting unspecified site  Comments:  consider neurology consult  consider EMG of LLE  pt will d/w Dr. hopkins next week    3. Abnormal skin growth  -     Ambulatory Referral to Dermatology    4. Mixed hyperlipidemia  Comments:  Labs reviewed, lipids stable on 5 mg atorvastatin.  Okay for trial off of atorvastatin   recheck lipids in 6 months      5. Vitamin B12 deficiency  Comments:  Labs reviewed, recommend resume B12 OTC daily    6. Postmenopausal  Comments:  Stable, continue estradiol 0.5 mg    7. Anxiety  Comments:  Stable but labile at times, continue Lexapro  Orders:  -     escitalopram (LEXAPRO) 10 MG tablet; Take 1 tablet by mouth Daily.  Dispense: 90 tablet; Refill: 1    8. Insomnia, unspecified type  Comments:  cont and refill ambien when needed.   Orders:  -     zolpidem (AMBIEN) 10 MG tablet; Take 1 tablet by mouth At Night As Needed for Sleep.  Dispense: 30 tablet; Refill: 5    9. Vitamin D deficiency  Comments:  Continue weekly vitamin D  Level in good range    10. Spinal cord stimulator status    11. Preventative health care  Comments:  Declines flu and COVID vaccines  Consider Shingrix at pharmacy  Mammogram up-to-date  Recommend patient to complete Cologuard kit she has at home        I spent 30 minutes caring for Denisse Flores on this date of service. This time includes time spent by me in the following activities: preparing for the visit, reviewing tests, performing a medically appropriate examination and/or evaluation , counseling and educating the patient/family/caregiver, ordering medications, tests, or procedures and documenting information in the medical record        Follow Up     Return in about 6 months (around 5/1/2023) for Recheck. HTN/HLD, back/knee pain. HTN panel,  b12, vit d prior to appt.  Patient was given instructions and counseling regarding her condition or for health maintenance advice. Please see specific information pulled into the AVS if appropriate.        Part of this note may be an electronic transcription/translation of spoken language to printed text using the Dragon Dictation System

## 2022-11-17 DIAGNOSIS — E78.2 MIXED HYPERLIPIDEMIA: ICD-10-CM

## 2022-11-17 RX ORDER — ATORVASTATIN CALCIUM 10 MG/1
10 TABLET, FILM COATED ORAL NIGHTLY
Qty: 90 TABLET | Refills: 1 | OUTPATIENT
Start: 2022-11-17

## 2023-01-09 ENCOUNTER — TRANSCRIBE ORDERS (OUTPATIENT)
Dept: ADMINISTRATIVE | Facility: HOSPITAL | Age: 63
End: 2023-01-09
Payer: COMMERCIAL

## 2023-01-09 ENCOUNTER — LAB (OUTPATIENT)
Dept: LAB | Facility: HOSPITAL | Age: 63
End: 2023-01-09
Payer: COMMERCIAL

## 2023-01-09 ENCOUNTER — HOSPITAL ENCOUNTER (OUTPATIENT)
Dept: CARDIOLOGY | Facility: HOSPITAL | Age: 63
Discharge: HOME | End: 2023-01-09
Payer: COMMERCIAL

## 2023-01-09 DIAGNOSIS — Z01.818 PRE-OP TESTING: Primary | ICD-10-CM

## 2023-01-09 DIAGNOSIS — Z01.818 PRE-OP TESTING: ICD-10-CM

## 2023-01-09 LAB
MRSA DNA SPEC QL NAA+PROBE: NORMAL
QT INTERVAL: 353 MS

## 2023-01-09 PROCEDURE — 87641 MR-STAPH DNA AMP PROBE: CPT

## 2023-01-09 PROCEDURE — 93010 ELECTROCARDIOGRAM REPORT: CPT | Performed by: INTERNAL MEDICINE

## 2023-01-09 PROCEDURE — 93005 ELECTROCARDIOGRAM TRACING: CPT | Performed by: PAIN MEDICINE

## 2023-04-24 ENCOUNTER — CLINICAL SUPPORT (OUTPATIENT)
Dept: FAMILY MEDICINE CLINIC | Facility: CLINIC | Age: 63
End: 2023-04-24
Payer: COMMERCIAL

## 2023-04-24 DIAGNOSIS — E53.8 VITAMIN B12 DEFICIENCY: ICD-10-CM

## 2023-04-24 DIAGNOSIS — E78.2 MIXED HYPERLIPIDEMIA: Primary | ICD-10-CM

## 2023-04-24 DIAGNOSIS — E55.9 VITAMIN D DEFICIENCY: ICD-10-CM

## 2023-04-24 PROCEDURE — 82607 VITAMIN B-12: CPT | Performed by: NURSE PRACTITIONER

## 2023-04-24 PROCEDURE — 80050 GENERAL HEALTH PANEL: CPT | Performed by: NURSE PRACTITIONER

## 2023-04-24 PROCEDURE — 36415 COLL VENOUS BLD VENIPUNCTURE: CPT | Performed by: NURSE PRACTITIONER

## 2023-04-24 PROCEDURE — 82306 VITAMIN D 25 HYDROXY: CPT | Performed by: NURSE PRACTITIONER

## 2023-04-24 PROCEDURE — 80061 LIPID PANEL: CPT | Performed by: NURSE PRACTITIONER

## 2023-04-24 NOTE — PROGRESS NOTES
Venipuncture Blood Specimen Collection  Venipuncture performed in the right arm by Fatou Bryan MA with good hemostasis. Patient tolerated the procedure well without complications.   04/24/23   Fatou Bryan MA

## 2023-04-25 LAB
25(OH)D3 SERPL-MCNC: 36.6 NG/ML (ref 30–100)
ALBUMIN SERPL-MCNC: 4.2 G/DL (ref 3.5–5.2)
ALBUMIN/GLOB SERPL: 1.5 G/DL
ALP SERPL-CCNC: 72 U/L (ref 39–117)
ALT SERPL W P-5'-P-CCNC: 16 U/L (ref 1–33)
ANION GAP SERPL CALCULATED.3IONS-SCNC: 9 MMOL/L (ref 5–15)
AST SERPL-CCNC: 22 U/L (ref 1–32)
BILIRUB SERPL-MCNC: 0.2 MG/DL (ref 0–1.2)
BUN SERPL-MCNC: 14 MG/DL (ref 8–23)
BUN/CREAT SERPL: 20 (ref 7–25)
CALCIUM SPEC-SCNC: 9.2 MG/DL (ref 8.6–10.5)
CHLORIDE SERPL-SCNC: 105 MMOL/L (ref 98–107)
CHOLEST SERPL-MCNC: 235 MG/DL (ref 0–200)
CO2 SERPL-SCNC: 27 MMOL/L (ref 22–29)
CREAT SERPL-MCNC: 0.7 MG/DL (ref 0.57–1)
DEPRECATED RDW RBC AUTO: 42.8 FL (ref 37–54)
EGFRCR SERPLBLD CKD-EPI 2021: 98.5 ML/MIN/1.73
ERYTHROCYTE [DISTWIDTH] IN BLOOD BY AUTOMATED COUNT: 12.5 % (ref 12.3–15.4)
GLOBULIN UR ELPH-MCNC: 2.8 GM/DL
GLUCOSE SERPL-MCNC: 91 MG/DL (ref 65–99)
HCT VFR BLD AUTO: 38.5 % (ref 34–46.6)
HDLC SERPL-MCNC: 61 MG/DL (ref 40–60)
HGB BLD-MCNC: 13.2 G/DL (ref 12–15.9)
LDLC SERPL CALC-MCNC: 155 MG/DL (ref 0–100)
LDLC/HDLC SERPL: 2.5 {RATIO}
MCH RBC QN AUTO: 32.4 PG (ref 26.6–33)
MCHC RBC AUTO-ENTMCNC: 34.3 G/DL (ref 31.5–35.7)
MCV RBC AUTO: 94.4 FL (ref 79–97)
PLATELET # BLD AUTO: 323 10*3/MM3 (ref 140–450)
PMV BLD AUTO: 10.6 FL (ref 6–12)
POTASSIUM SERPL-SCNC: 4.4 MMOL/L (ref 3.5–5.2)
PROT SERPL-MCNC: 7 G/DL (ref 6–8.5)
RBC # BLD AUTO: 4.08 10*6/MM3 (ref 3.77–5.28)
SODIUM SERPL-SCNC: 141 MMOL/L (ref 136–145)
TRIGL SERPL-MCNC: 107 MG/DL (ref 0–150)
TSH SERPL DL<=0.05 MIU/L-ACNC: 2.61 UIU/ML (ref 0.27–4.2)
VIT B12 BLD-MCNC: 332 PG/ML (ref 211–946)
VLDLC SERPL-MCNC: 19 MG/DL (ref 5–40)
WBC NRBC COR # BLD: 5.86 10*3/MM3 (ref 3.4–10.8)

## 2023-05-09 ENCOUNTER — OFFICE VISIT (OUTPATIENT)
Dept: FAMILY MEDICINE CLINIC | Facility: CLINIC | Age: 63
End: 2023-05-09
Payer: COMMERCIAL

## 2023-05-09 VITALS
DIASTOLIC BLOOD PRESSURE: 68 MMHG | RESPIRATION RATE: 18 BRPM | BODY MASS INDEX: 25.66 KG/M2 | HEART RATE: 76 BPM | HEIGHT: 65 IN | WEIGHT: 154 LBS | OXYGEN SATURATION: 97 % | TEMPERATURE: 98.6 F | SYSTOLIC BLOOD PRESSURE: 106 MMHG

## 2023-05-09 DIAGNOSIS — Z78.0 POSTMENOPAUSAL: ICD-10-CM

## 2023-05-09 DIAGNOSIS — E78.2 MIXED HYPERLIPIDEMIA: ICD-10-CM

## 2023-05-09 DIAGNOSIS — F41.9 ANXIETY: ICD-10-CM

## 2023-05-09 DIAGNOSIS — G47.00 INSOMNIA, UNSPECIFIED TYPE: ICD-10-CM

## 2023-05-09 DIAGNOSIS — G90.50 COMPLEX REGIONAL PAIN SYNDROME TYPE 1, AFFECTING UNSPECIFIED SITE: ICD-10-CM

## 2023-05-09 DIAGNOSIS — M25.562 CHRONIC PAIN OF LEFT KNEE: ICD-10-CM

## 2023-05-09 DIAGNOSIS — E53.8 VITAMIN B12 DEFICIENCY: Primary | ICD-10-CM

## 2023-05-09 DIAGNOSIS — E55.9 VITAMIN D DEFICIENCY: ICD-10-CM

## 2023-05-09 DIAGNOSIS — Z12.31 BREAST CANCER SCREENING BY MAMMOGRAM: ICD-10-CM

## 2023-05-09 DIAGNOSIS — Z12.11 COLON CANCER SCREENING: ICD-10-CM

## 2023-05-09 DIAGNOSIS — M25.551 RIGHT HIP PAIN: ICD-10-CM

## 2023-05-09 DIAGNOSIS — G89.29 CHRONIC PAIN OF LEFT KNEE: ICD-10-CM

## 2023-05-09 RX ORDER — ESCITALOPRAM OXALATE 10 MG/1
10 TABLET ORAL DAILY
Qty: 90 TABLET | Refills: 1 | Status: SHIPPED | OUTPATIENT
Start: 2023-05-09

## 2023-05-09 RX ORDER — ERGOCALCIFEROL 1.25 MG/1
CAPSULE ORAL
Qty: 12 CAPSULE | Refills: 1 | Status: SHIPPED | OUTPATIENT
Start: 2023-05-09

## 2023-05-09 RX ORDER — ESTRADIOL 1 MG/1
1 TABLET ORAL DAILY
Qty: 90 TABLET | Refills: 1 | Status: SHIPPED | OUTPATIENT
Start: 2023-05-09

## 2023-05-09 RX ORDER — ZOLPIDEM TARTRATE 10 MG/1
10 TABLET ORAL NIGHTLY PRN
Qty: 30 TABLET | Refills: 5 | Status: SHIPPED | OUTPATIENT
Start: 2023-05-09

## 2023-05-09 NOTE — PROGRESS NOTES
Chief Complaint  Chief Complaint   Patient presents with   • Insomnia     Does not feel like ambien is helping as well   • Extremity Pain         Subjective          Denisse Flores presents to [facility] for   History of Present Illness      Hyperlipidemia, previous lipids stable, atorvastatin was discontinued for trial and here to review labs today.  She continues eating healthy. She has family history of CAD. The patient denies muscle aches, constipation, diarrhea, GI upset, fatigue, chest pain/pressure, exercise intolerance, dyspnea, palpitations, syncope and pedal edema.       Insomnia, 2/2 chronic pain, she wakes up hourly, ambien was increased to 10mg, no longer effective and waking up d/t pain.       Anxiety, patient denies significant weight loss/gain, hypersomnia, psychomotor agitation, psychomotor retardation, fatigue (loss of energy), feelings of worthlessness (guilt), impaired concentration (indecisiveness), thoughts of death or suicide.       Chronic regional pain syndrome, chronic left knee pain, with history of L total knee replacement, mini revision and joint injections per Dr. Priest in past. Still w/ severe poor mobility and pain of the left thigh/knee. She underwent SCS on 3/29/22 per Dr. Young, mobility of the knee was unchanged and pain did not improve. The spinal cord stimulator caused more pain at the site and then alleviate a chronic pain, SCS was removed. She lives with pain 24/7, continues to try to stay active and do normal activities as the pain does not change one way or the other.    She is now having R hip pain, she has been compensating for the pain in left leg/knee     She follows with [facility] for chronic Left hand pain s/p fall, developed CRPS     She hasn't made it to derm for right lower jaw facial lesion    Vitamin D deficiency, on weekly vitamin D    B12 deficiency, she resumed taking B complex daily     cologuard reordered April 2022, not  done.    Mammo due, negative 22       The following portions of the patient's history were reviewed and updated as appropriate: allergies, current medications, past family history, past medical history, past social history, past surgical history and problem list.    Past Medical History:   Diagnosis Date   • Allergic since childhood   • Anxiety    • Arthritis    • Cancer    • Environmental allergies    • High cholesterol    • History of panic attacks    • History of skin cancer 1970'S   • History of transfusion    • HL (hearing loss)    • Insomnia    • Knee pain    • Migraine    • Urinary tract infection    • Vitamin D deficiency      Past Surgical History:   Procedure Laterality Date   •  SECTION      X 3   • CHOLECYSTECTOMY     • DILATATION AND CURETTAGE     • JOINT MANIPULATION Left 10/22/2019    Procedure: MANIPULATION OF KNEE with steriod injection;  Surgeon: Pedro Priest MD;  Location: [facility];  Service: Orthopedics   • JOINT REPLACEMENT     • KNEE MINI REVISION Left 08/15/2019    Procedure: LEFT TOTAL KNEE ARTHROPLASTY REVISION POLY CHANGE;  Surgeon: Pedro Priest MD;  Location: [facility];  Service: Orthopedics   • WA ARTHRP KNE CONDYLE&PLATU MEDIAL&LAT COMPARTMENTS Left 2017    Procedure: LT TOTAL KNEE ARTHROPLASTY;  Surgeon: Pedro Priest MD;  Location: [facility];  Service: Orthopedics   • SPINAL CORD STIMULATOR IMPLANT      Later removed   • TOTAL ABDOMINAL HYSTERECTOMY WITH SALPINGO OOPHORECTOMY  2009    fibroids     Family History   Problem Relation Age of Onset   • Migraines Mother    • Heart attack Maternal Uncle    • Diabetes Maternal Grandfather    • Heart attack Maternal Grandfather      Social History     Tobacco Use   • Smoking status: Never     Passive exposure: Never   • Smokeless tobacco: Never   Substance Use Topics   • Alcohol use: Yes     Alcohol/week: 0.0 standard drinks     Comment: ABOUT 2X YEARLY       Current Outpatient Medications:   •  B  "Complex-C-Iron (Super B-Complex/Iron/Vitamin C) tablet, , Disp: , Rfl:   •  escitalopram (LEXAPRO) 10 MG tablet, Take 1 tablet by mouth Daily., Disp: 90 tablet, Rfl: 1  •  estradiol (ESTRACE) 1 MG tablet, Take 1 tablet by mouth Daily., Disp: 90 tablet, Rfl: 1  •  Omega-3 Fatty Acids (FISH OIL) 1000 MG capsule capsule, Take 1 capsule by mouth Daily With Breakfast., Disp: , Rfl:   •  traMADol (ULTRAM) 50 MG tablet, TAKE 2 TABLETS BY MOUTH THREE TIMES DAILY AS NEEDED FOR PAIN, Disp: , Rfl:   •  vitamin C (ASCORBIC ACID) 500 MG tablet, Take 1 tablet by mouth Daily., Disp: , Rfl:   •  vitamin D (ERGOCALCIFEROL) 1.25 MG (14124 UT) capsule capsule, TAKE ONE CAPSULE BY MOUTH ONCE WEEKLY ON MONDAYS, Disp: 12 capsule, Rfl: 1  •  zolpidem (AMBIEN) 10 MG tablet, Take 1 tablet by mouth At Night As Needed for Sleep., Disp: 30 tablet, Rfl: 5    Objective   Vital Signs:   /68 (BP Location: Left arm, Patient Position: Sitting, Cuff Size: Adult)   Pulse 76   Temp 98.6 °F (37 °C) (Oral)   Resp 18   Ht 165.1 cm (65\")   Wt 69.9 kg (154 lb)   SpO2 97% Comment: Room air  BMI 25.63 kg/m²           Physical Exam  Vitals and nursing note reviewed.   Constitutional:       General: She is not in acute distress.     Appearance: She is well-developed. She is not diaphoretic.   Eyes:      Pupils: Pupils are equal, round, and reactive to light.   Neck:      Thyroid: No thyromegaly.      Vascular: No JVD.   Cardiovascular:      Rate and Rhythm: Normal rate and regular rhythm.      Heart sounds: Normal heart sounds. No murmur heard.  Pulmonary:      Effort: Pulmonary effort is normal. No respiratory distress.      Breath sounds: Normal breath sounds.   Abdominal:      General: Bowel sounds are normal. There is no distension.      Palpations: Abdomen is soft.      Tenderness: There is no abdominal tenderness.   Musculoskeletal:         General: Tenderness (chronic left knee/leg pain, severe banerjee mobility and range of motion.  Spinal cord " stimulator has been removed) present. Normal range of motion.      Cervical back: Normal range of motion and neck supple.   Skin:     General: Skin is warm and dry.   Neurological:      General: No focal deficit present.      Mental Status: She is alert and oriented to person, place, and time. Mental status is at baseline.      Sensory: Sensory deficit (LLE) present.      Gait: Gait abnormal (Due to chronic left knee/leg pain).   Psychiatric:         Attention and Perception: Attention normal.         Mood and Affect: Mood is anxious and depressed.         Behavior: Behavior normal. Behavior is cooperative.         Thought Content: Thought content normal.         Judgment: Judgment normal.          Result Review :     No visits with results within 7 Day(s) from this visit.   Latest known visit with results is:   Clinical Support on 04/24/2023   Component Date Value Ref Range Status   • WBC 04/24/2023 5.86  3.40 - 10.80 10*3/mm3 Final   • RBC 04/24/2023 4.08  3.77 - 5.28 10*6/mm3 Final   • Hemoglobin 04/24/2023 13.2  12.0 - 15.9 g/dL Final   • Hematocrit 04/24/2023 38.5  34.0 - 46.6 % Final   • MCV 04/24/2023 94.4  79.0 - 97.0 fL Final   • MCH 04/24/2023 32.4  26.6 - 33.0 pg Final   • MCHC 04/24/2023 34.3  31.5 - 35.7 g/dL Final   • RDW 04/24/2023 12.5  12.3 - 15.4 % Final   • RDW-SD 04/24/2023 42.8  37.0 - 54.0 fl Final   • MPV 04/24/2023 10.6  6.0 - 12.0 fL Final   • Platelets 04/24/2023 323  140 - 450 10*3/mm3 Final   • Glucose 04/24/2023 91  65 - 99 mg/dL Final   • BUN 04/24/2023 14  8 - 23 mg/dL Final   • Creatinine 04/24/2023 0.70  0.57 - 1.00 mg/dL Final   • Sodium 04/24/2023 141  136 - 145 mmol/L Final   • Potassium 04/24/2023 4.4  3.5 - 5.2 mmol/L Final   • Chloride 04/24/2023 105  98 - 107 mmol/L Final   • CO2 04/24/2023 27.0  22.0 - 29.0 mmol/L Final   • Calcium 04/24/2023 9.2  8.6 - 10.5 mg/dL Final   • Total Protein 04/24/2023 7.0  6.0 - 8.5 g/dL Final   • Albumin 04/24/2023 4.2  3.5 - 5.2 g/dL Final   •  ALT (SGPT) 04/24/2023 16  1 - 33 U/L Final   • AST (SGOT) 04/24/2023 22  1 - 32 U/L Final   • Alkaline Phosphatase 04/24/2023 72  39 - 117 U/L Final   • Total Bilirubin 04/24/2023 0.2  0.0 - 1.2 mg/dL Final   • Globulin 04/24/2023 2.8  gm/dL Final   • A/G Ratio 04/24/2023 1.5  g/dL Final   • BUN/Creatinine Ratio 04/24/2023 20.0  7.0 - 25.0 Final   • Anion Gap 04/24/2023 9.0  5.0 - 15.0 mmol/L Final   • eGFR 04/24/2023 98.5  >60.0 mL/min/1.73 Final   • Total Cholesterol 04/24/2023 235 (H)  0 - 200 mg/dL Final   • Triglycerides 04/24/2023 107  0 - 150 mg/dL Final   • HDL Cholesterol 04/24/2023 61 (H)  40 - 60 mg/dL Final   • LDL Cholesterol  04/24/2023 155 (H)  0 - 100 mg/dL Final   • VLDL Cholesterol 04/24/2023 19  5 - 40 mg/dL Final   • LDL/HDL Ratio 04/24/2023 2.50   Final   • TSH 04/24/2023 2.610  0.270 - 4.200 uIU/mL Final   • Vitamin B-12 04/24/2023 332  211 - 946 pg/mL Final   • 25 Hydroxy, Vitamin D 04/24/2023 36.6  30.0 - 100.0 ng/ml Final                  BMI is >= 25 and <30. (Overweight) The following options were offered after discussion;: exercise counseling/recommendations and nutrition counseling/recommendations           Assessment and Plan    Diagnoses and all orders for this visit:    1. Vitamin B12 deficiency (Primary)  Comments:  Continue daily OTC B12    2. Vitamin D deficiency  Comments:  Continue weekly vitamin D  Orders:  -     vitamin D (ERGOCALCIFEROL) 1.25 MG (24028 UT) capsule capsule; TAKE ONE CAPSULE BY MOUTH ONCE WEEKLY ON MONDAYS  Dispense: 12 capsule; Refill: 1    3. Mixed hyperlipidemia  Comments:  Patient is now off statin  Total and LDL have increased, HDL very good at 61, low cardiac risk  Recheck 6 mo, resume statin if indicated    4. Complex regional pain syndrome type 1, affecting unspecified site  Comments:  cont with Dr. Young, rec pt to discuss with him re: increasing tramadol or other med changes.     5. Insomnia, unspecified type  Comments:  rec no change to ambien for  now, this is only medication that has been effective   tried multiple meds in past and not effective  rec controlling pain  Orders:  -     zolpidem (AMBIEN) 10 MG tablet; Take 1 tablet by mouth At Night As Needed for Sleep.  Dispense: 30 tablet; Refill: 5    6. Chronic pain of left knee    7. Anxiety  -     escitalopram (LEXAPRO) 10 MG tablet; Take 1 tablet by mouth Daily.  Dispense: 90 tablet; Refill: 1    8. Right hip pain  Comments:  likely d/t compensation for LLE    9. Insomnia, unspecified type  Comments:  labile but d/t pain, cont and refill ambien for now.   Orders:  -     zolpidem (AMBIEN) 10 MG tablet; Take 1 tablet by mouth At Night As Needed for Sleep.  Dispense: 30 tablet; Refill: 5    10. Anxiety  Comments:  Stable but labile at times, continue Lexapro  Orders:  -     escitalopram (LEXAPRO) 10 MG tablet; Take 1 tablet by mouth Daily.  Dispense: 90 tablet; Refill: 1    11. Postmenopausal  Comments:  cont estradiol, may try cutting in half for trial  Orders:  -     estradiol (ESTRACE) 1 MG tablet; Take 1 tablet by mouth Daily.  Dispense: 90 tablet; Refill: 1    12. Colon cancer screening  Comments:  Reordered due to expiration  Orders:  -     Cologuard - Stool, Per Rectum; Future    13. Breast cancer screening by mammogram  -     Mammo Screening Digital Tomosynthesis Bilateral With CAD; Future       Consider pain mgmt 2nd opinion  Cont ambien same for now and recommend better pain control       I spent 30 minutes caring for Denisse Flores on this date of service. This time includes time spent by me in the following activities: preparing for the visit, reviewing tests, performing a medically appropriate examination and/or evaluation , counseling and educating the patient/family/caregiver, ordering medications, tests, or procedures and documenting information in the medical record        Follow Up     Return in about 6 months (around 11/9/2023) for Recheck, Annual physical. HTN panel prior .  Patient was  given instructions and counseling regarding her condition or for health maintenance advice. Please see specific information pulled into the AVS if appropriate.        Part of this note may be an electronic transcription/translation of spoken language to printed text using the Dragon Dictation System

## 2023-05-18 DIAGNOSIS — G47.00 INSOMNIA, UNSPECIFIED TYPE: ICD-10-CM

## 2023-05-18 RX ORDER — ZOLPIDEM TARTRATE 10 MG/1
10 TABLET ORAL NIGHTLY PRN
Qty: 30 TABLET | OUTPATIENT
Start: 2023-05-18

## 2023-05-23 ENCOUNTER — TRANSCRIBE ORDERS (OUTPATIENT)
Dept: ADMINISTRATIVE | Facility: HOSPITAL | Age: 63
End: 2023-05-23
Payer: COMMERCIAL

## 2023-05-23 DIAGNOSIS — M51.36 DEGENERATION OF LUMBAR INTERVERTEBRAL DISC: Primary | ICD-10-CM

## 2023-06-27 NOTE — PLAN OF CARE
Problem: Patient Care Overview (Adult)  Goal: Plan of Care Review    08/24/17 1250   Coping/Psychosocial Response Interventions   Plan Of Care Reviewed With patient   Outcome Evaluation   Outcome Summary/Follow up Plan Episodes of pain and dizziness noted this date. Tolerated exercises and ambulation fairly. Ambulaiton limited due to pain. Will continue to address defiicits and progress as appropriate. RW ordered for patient.           
Problem: Patient Care Overview (Adult)  Goal: Plan of Care Review  Outcome: Ongoing (interventions implemented as appropriate)    08/22/17 1604   Coping/Psychosocial Response Interventions   Plan Of Care Reviewed With patient   Outcome Evaluation   Outcome Summary/Follow up Plan Pt presents with impaired functional mobility and gait secondary to L LE pain, weakness, and decreased activity tolerance s/p L TKA. Gait distance was limited today secondary to pt having dizziness, nausea and feeling as though she may pass out. Pt may benefit from skilled PT to address strength, ROM, and mobilty.         Problem: Inpatient Physical Therapy  Goal: Transfer Training Goal 1 LTG- PT  Outcome: Ongoing (interventions implemented as appropriate)    08/22/17 1604   Transfer Training PT LTG   Transfer Training PT LTG, Time to Achieve 3 days   Transfer Training PT LTG, Activity Type all transfers   Transfer Training PT LTG, Kaufman Level supervision required   Transfer Training PT LTG, Assist Device walker, rolling       Goal: Gait Training Goal LTG- PT  Outcome: Ongoing (interventions implemented as appropriate)    08/22/17 1604   Gait Training PT LTG   Gait Training Goal PT LTG, Time to Achieve 1 wk   Gait Training Goal PT LTG, Kaufman Level supervision required   Gait Training Goal PT LTG, Assist Device walker, rolling   Gait Training Goal PT LTG, Distance to Achieve 150       Goal: Range of Motion Goal LTG- PT  Outcome: Ongoing (interventions implemented as appropriate)    08/22/17 1604   Range of Motion PT LTG   Range of Motion Goal PT LTG, Time to Achieve 3 days   Range fo Motion Goal PT LTG, Joint L knee   Range of Motion Goal PT LTG, AROM Measure 0-90           
Problem: Patient Care Overview (Adult)  Goal: Plan of Care Review  Outcome: Ongoing (interventions implemented as appropriate)    08/22/17 1642   Coping/Psychosocial Response Interventions   Plan Of Care Reviewed With patient   Outcome Evaluation   Outcome Summary/Follow up Plan post op VSS. DTV 2000. PT eval completed at bedside. unable to ambulate with PT R/T N/V and dizziness. stand & pivot to chair. Percocet, Morphine, and Toradol given this shift for pain. left knee dressing CDI and HV present. plans to discharge to rehab when medically stable.    Patient Care Overview   Progress improving         Problem: Fall Risk (Adult)  Goal: Identify Related Risk Factors and Signs and Symptoms  Outcome: Outcome(s) achieved Date Met:  08/22/17  Goal: Absence of Falls  Outcome: Ongoing (interventions implemented as appropriate)    Problem: Knee Replacement, Total (Adult)  Goal: Signs and Symptoms of Listed Potential Problems Will be Absent or Manageable (Knee Replacement, Total)  Outcome: Ongoing (interventions implemented as appropriate)      
Problem: Patient Care Overview (Adult)  Goal: Plan of Care Review  Outcome: Ongoing (interventions implemented as appropriate)    08/23/17 0302   Coping/Psychosocial Response Interventions   Plan Of Care Reviewed With patient   Outcome Evaluation   Outcome Summary/Follow up Plan VSS. Pain controlled with PO pain med. Voiding without difficulty. No c/o n/v this shift. Dr. Soares in to see pt r/t episode on dayshift. Episode was believed to be panic attack per MD. No c/o soa or shaking on this shift. Discussed medication and relaxation techniques r/t hx of anxiety. Plans to d/c to rehab.    Patient Care Overview   Progress improving         Problem: Perioperative Period (Adult)  Goal: Signs and Symptoms of Listed Potential Problems Will be Absent or Manageable (Perioperative Period)  Outcome: Ongoing (interventions implemented as appropriate)    Problem: Fall Risk (Adult)  Goal: Absence of Falls  Outcome: Ongoing (interventions implemented as appropriate)    Problem: Knee Replacement, Total (Adult)  Goal: Signs and Symptoms of Listed Potential Problems Will be Absent or Manageable (Knee Replacement, Total)  Outcome: Ongoing (interventions implemented as appropriate)      
Problem: Patient Care Overview (Adult)  Goal: Plan of Care Review  Outcome: Ongoing (interventions implemented as appropriate)    08/23/17 1148   Coping/Psychosocial Response Interventions   Plan Of Care Reviewed With patient   Outcome Evaluation   Outcome Summary/Follow up Plan Pt increased ambulation distance and exercise tolerance. Progressing well w/ PT.            
Problem: Patient Care Overview (Adult)  Goal: Plan of Care Review  Outcome: Ongoing (interventions implemented as appropriate)    08/23/17 2537   Coping/Psychosocial Response Interventions   Plan Of Care Reviewed With patient   Outcome Evaluation   Outcome Summary/Follow up Plan VSS. PT gym session x 2. increased ambulation with PT and nursing staff. c/o severe pain this shift. Percocet discontinued and Norco ordered. voiding per [facility] without difficulty. [facility] following for medical management. dressing changed to left knee and HV removed. possible discharge in AM if medically stable.    Patient Care Overview   Progress improving         Problem: Fall Risk (Adult)  Goal: Absence of Falls  Outcome: Ongoing (interventions implemented as appropriate)    Problem: Knee Replacement, Total (Adult)  Goal: Signs and Symptoms of Listed Potential Problems Will be Absent or Manageable (Knee Replacement, Total)  Outcome: Ongoing (interventions implemented as appropriate)      
Problem: Patient Care Overview (Adult)  Goal: Plan of Care Review  Outcome: Ongoing (interventions implemented as appropriate)    08/24/17 0331   Coping/Psychosocial Response Interventions   Plan Of Care Reviewed With patient   Outcome Evaluation   Outcome Summary/Follow up Plan patient ambulating with assistance to bathroom and in hallway, pain controlled with oral pain medication at this time, patient intrusted on the importance of striaghtening leg while ambulating, patient educated on stress reduction to prevent anxiety attacks   Patient Care Overview   Progress improving       Goal: Adult Individualization and Mutuality  Outcome: Ongoing (interventions implemented as appropriate)  Goal: Discharge Needs Assessment  Outcome: Ongoing (interventions implemented as appropriate)    Problem: Fall Risk (Adult)  Goal: Absence of Falls  Outcome: Ongoing (interventions implemented as appropriate)    Problem: Knee Replacement, Total (Adult)  Goal: Signs and Symptoms of Listed Potential Problems Will be Absent or Manageable (Knee Replacement, Total)  Outcome: Ongoing (interventions implemented as appropriate)      
Problem: Patient Care Overview (Adult)  Goal: Plan of Care Review  Outcome: Ongoing (interventions implemented as appropriate)    08/24/17 1250 08/24/17 2021   Coping/Psychosocial Response Interventions   Plan Of Care Reviewed With patient --    Outcome Evaluation   Outcome Summary/Follow up Plan --  Pt complains of increased pain today. Ambulated with PT and staff. Possible d/c home tomorrow.   Patient Care Overview   Progress --  progress towards functional goals is fair       Goal: Adult Individualization and Mutuality  Outcome: Ongoing (interventions implemented as appropriate)  Goal: Discharge Needs Assessment  Outcome: Ongoing (interventions implemented as appropriate)    Problem: Fall Risk (Adult)  Goal: Absence of Falls  Outcome: Ongoing (interventions implemented as appropriate)    Problem: Knee Replacement, Total (Adult)  Goal: Signs and Symptoms of Listed Potential Problems Will be Absent or Manageable (Knee Replacement, Total)  Outcome: Ongoing (interventions implemented as appropriate)      
Problem: Patient Care Overview (Adult)  Goal: Plan of Care Review  Outcome: Ongoing (interventions implemented as appropriate)    08/25/17 0255   Coping/Psychosocial Response Interventions   Plan Of Care Reviewed With patient   Outcome Evaluation   Outcome Summary/Follow up Plan patient resting comfortably through night, pain controlled with oral pain medication at this time, paitient educated on s/s of anxiety attack and stress management   Patient Care Overview   Progress improving       Goal: Adult Individualization and Mutuality  Outcome: Ongoing (interventions implemented as appropriate)  Goal: Discharge Needs Assessment  Outcome: Ongoing (interventions implemented as appropriate)    Problem: Fall Risk (Adult)  Goal: Absence of Falls  Outcome: Ongoing (interventions implemented as appropriate)    Problem: Knee Replacement, Total (Adult)  Goal: Signs and Symptoms of Listed Potential Problems Will be Absent or Manageable (Knee Replacement, Total)  Outcome: Ongoing (interventions implemented as appropriate)      
Problem: Perioperative Period (Adult)  Goal: Signs and Symptoms of Listed Potential Problems Will be Absent or Manageable (Perioperative Period)  Outcome: Ongoing (interventions implemented as appropriate)      
Problem: Perioperative Period (Adult)  Goal: Signs and Symptoms of Listed Potential Problems Will be Absent or Manageable (Perioperative Period)  Outcome: Ongoing (interventions implemented as appropriate)    08/22/17 0802   Perioperative Period   Problems Assessed (Perioperative Period) pain   Problems Present (Perioperative Period) pain           
No (0)

## 2023-10-10 ENCOUNTER — PROCEDURE VISIT (OUTPATIENT)
Dept: NEUROLOGY | Facility: CLINIC | Age: 63
End: 2023-10-10
Payer: COMMERCIAL

## 2023-10-10 DIAGNOSIS — M79.605 LEG PAIN, DIFFUSE, LEFT: Primary | ICD-10-CM

## 2023-10-10 PROCEDURE — 95912 NRV CNDJ TEST 11-12 STUDIES: CPT | Performed by: PSYCHIATRY & NEUROLOGY

## 2023-10-10 PROCEDURE — 95886 MUSC TEST DONE W/N TEST COMP: CPT | Performed by: PSYCHIATRY & NEUROLOGY

## 2023-10-10 NOTE — PROGRESS NOTES
"EMG and Nerve Conduction Studies    I.      Instrument used: Neuromax 1002  II.     Please see data sheets for tabular summary of NCS and details on methods, temperatures and lab standards.   III.    EMG muscles tested for upper extremity studies include the deltoid, biceps, triceps, pronator teres, extensor digitorum communis, first dorsal interosseous and abductor pollicis brevis.    IV.   EMG muscles tested for lower extremity studies include the vastus lateralis, tibialis anterior, peroneus longus, medial gastrocnemius and extensor digitorum brevis.    V.    Additional muscles tested as needed.  Paraspinal muscles tested as needed.   VI.   Please see data sheets for tabular summary of EMG findings.   VII. The complete report includes the data sheets.      Indication: Left leg pain  History: 63-year-old white female with pain in the left leg from mid thigh to the lower portion of the lower leg across the knee.  This has been present since her left total knee replacement.  There is some numbness on the top of the left foot.  She indicates that no tourniquet was used on the thigh during her left total knee replacement.  She describes postoperatively having trouble raising the leg up.  She describes some \"foot drop\" symptoms but she indicates she is not scuffing the foot and she does not have problems pushing off with the foot.  She denies diabetes or thyroid disease.  MRI of the lumbar spine demonstrates mild degenerative changes without any significant root impingement or canal stenosis      Ht: 165.1 cm  Wt: See 2 9.9 kg  HbA1C: No results found for: \"HGBA1C\"  TSH:   Lab Results   Component Value Date    TSH 2.610 04/24/2023       Technical summary:  Nerve conduction studies were obtained in the left leg.  Skin temperatures were cold but temperature correction was not needed.  Needle examination was obtained on selected muscles in both legs.    Results:  1.  Normal left sural sensory distal latency and " amplitude.  2.  Normal left superficial peroneal sensory distal latency and amplitude.  3.  Normal left saphenous sensory distal latency and amplitude.  4.  Normal left peroneal motor conduction velocities with a normal distal latency, amplitude and F-wave.  5.  Normal left tibial motor conduction velocity with a normal distal latency, amplitudes and F-wave.  6.  Needle examination of selected muscles in both legs showed normal insertional activities throughout.  There were normal motor units and recruitment patterns throughout.      Impression:  Normal study.  No evidence of a left peroneal or tibial neuropathy, sciatic or femoral neuropathy or lumbosacral radiculopathy by this study.  Study results were discussed with the patient.    Harshal Orellana M.D.              Dictated utilizing Dragon dictation.

## 2023-10-24 DIAGNOSIS — R79.89 ABNORMAL TSH: ICD-10-CM

## 2023-10-24 DIAGNOSIS — E78.2 MIXED HYPERLIPIDEMIA: Primary | ICD-10-CM

## 2023-11-03 ENCOUNTER — CLINICAL SUPPORT (OUTPATIENT)
Dept: FAMILY MEDICINE CLINIC | Facility: CLINIC | Age: 63
End: 2023-11-03
Payer: COMMERCIAL

## 2023-11-03 DIAGNOSIS — E78.2 MIXED HYPERLIPIDEMIA: ICD-10-CM

## 2023-11-03 DIAGNOSIS — R79.89 ABNORMAL TSH: ICD-10-CM

## 2023-11-03 LAB
DEPRECATED RDW RBC AUTO: 41.4 FL (ref 37–54)
ERYTHROCYTE [DISTWIDTH] IN BLOOD BY AUTOMATED COUNT: 11.7 % (ref 12.3–15.4)
HCT VFR BLD AUTO: 39.9 % (ref 34–46.6)
HGB BLD-MCNC: 13.6 G/DL (ref 12–15.9)
MCH RBC QN AUTO: 33 PG (ref 26.6–33)
MCHC RBC AUTO-ENTMCNC: 34.1 G/DL (ref 31.5–35.7)
MCV RBC AUTO: 96.8 FL (ref 79–97)
PLATELET # BLD AUTO: 315 10*3/MM3 (ref 140–450)
PMV BLD AUTO: 10.6 FL (ref 6–12)
RBC # BLD AUTO: 4.12 10*6/MM3 (ref 3.77–5.28)
WBC NRBC COR # BLD: 6.29 10*3/MM3 (ref 3.4–10.8)

## 2023-11-03 PROCEDURE — 80061 LIPID PANEL: CPT | Performed by: NURSE PRACTITIONER

## 2023-11-03 PROCEDURE — 36415 COLL VENOUS BLD VENIPUNCTURE: CPT | Performed by: NURSE PRACTITIONER

## 2023-11-03 PROCEDURE — 80050 GENERAL HEALTH PANEL: CPT | Performed by: NURSE PRACTITIONER

## 2023-11-03 NOTE — PROGRESS NOTES
Venipuncture Blood Specimen Collection  Venipuncture performed in the right arm by Fatou Bryan MA with good hemostasis. Patient tolerated the procedure well without complications.   11/03/23   Fatou Bryan MA

## 2023-11-04 LAB
ALBUMIN SERPL-MCNC: 4.1 G/DL (ref 3.5–5.2)
ALBUMIN/GLOB SERPL: 1.6 G/DL
ALP SERPL-CCNC: 63 U/L (ref 39–117)
ALT SERPL W P-5'-P-CCNC: 18 U/L (ref 1–33)
ANION GAP SERPL CALCULATED.3IONS-SCNC: 8.2 MMOL/L (ref 5–15)
AST SERPL-CCNC: 23 U/L (ref 1–32)
BILIRUB SERPL-MCNC: 0.6 MG/DL (ref 0–1.2)
BUN SERPL-MCNC: 14 MG/DL (ref 8–23)
BUN/CREAT SERPL: 16.3 (ref 7–25)
CALCIUM SPEC-SCNC: 9.7 MG/DL (ref 8.6–10.5)
CHLORIDE SERPL-SCNC: 104 MMOL/L (ref 98–107)
CHOLEST SERPL-MCNC: 248 MG/DL (ref 0–200)
CO2 SERPL-SCNC: 28.8 MMOL/L (ref 22–29)
CREAT SERPL-MCNC: 0.86 MG/DL (ref 0.57–1)
EGFRCR SERPLBLD CKD-EPI 2021: 76.5 ML/MIN/1.73
GLOBULIN UR ELPH-MCNC: 2.6 GM/DL
GLUCOSE SERPL-MCNC: 87 MG/DL (ref 65–99)
HDLC SERPL-MCNC: 67 MG/DL (ref 40–60)
LDLC SERPL CALC-MCNC: 163 MG/DL (ref 0–100)
LDLC/HDLC SERPL: 2.4 {RATIO}
POTASSIUM SERPL-SCNC: 4.2 MMOL/L (ref 3.5–5.2)
PROT SERPL-MCNC: 6.7 G/DL (ref 6–8.5)
SODIUM SERPL-SCNC: 141 MMOL/L (ref 136–145)
TRIGL SERPL-MCNC: 101 MG/DL (ref 0–150)
TSH SERPL DL<=0.05 MIU/L-ACNC: 1.87 UIU/ML (ref 0.27–4.2)
VLDLC SERPL-MCNC: 18 MG/DL (ref 5–40)

## 2023-11-06 DIAGNOSIS — Z78.0 POSTMENOPAUSAL: ICD-10-CM

## 2023-11-06 RX ORDER — ESTRADIOL 1 MG/1
1 TABLET ORAL DAILY
Qty: 90 TABLET | Refills: 1 | Status: SHIPPED | OUTPATIENT
Start: 2023-11-06

## 2023-11-07 ENCOUNTER — OFFICE VISIT (OUTPATIENT)
Dept: FAMILY MEDICINE CLINIC | Facility: CLINIC | Age: 63
End: 2023-11-07
Payer: COMMERCIAL

## 2023-11-07 VITALS
HEART RATE: 80 BPM | WEIGHT: 148 LBS | BODY MASS INDEX: 24.66 KG/M2 | DIASTOLIC BLOOD PRESSURE: 70 MMHG | HEIGHT: 65 IN | SYSTOLIC BLOOD PRESSURE: 124 MMHG | OXYGEN SATURATION: 95 %

## 2023-11-07 DIAGNOSIS — G89.29 CHRONIC PAIN OF LEFT KNEE: ICD-10-CM

## 2023-11-07 DIAGNOSIS — E55.9 VITAMIN D DEFICIENCY: ICD-10-CM

## 2023-11-07 DIAGNOSIS — E53.8 VITAMIN B12 DEFICIENCY: ICD-10-CM

## 2023-11-07 DIAGNOSIS — M25.562 CHRONIC PAIN OF LEFT KNEE: ICD-10-CM

## 2023-11-07 DIAGNOSIS — F41.9 ANXIETY: ICD-10-CM

## 2023-11-07 DIAGNOSIS — E78.2 MIXED HYPERLIPIDEMIA: Primary | ICD-10-CM

## 2023-11-07 DIAGNOSIS — G90.50 COMPLEX REGIONAL PAIN SYNDROME TYPE 1, AFFECTING UNSPECIFIED SITE: ICD-10-CM

## 2023-11-07 DIAGNOSIS — G47.00 INSOMNIA, UNSPECIFIED TYPE: ICD-10-CM

## 2023-11-07 PROCEDURE — 99214 OFFICE O/P EST MOD 30 MIN: CPT | Performed by: NURSE PRACTITIONER

## 2023-11-07 RX ORDER — ZOLPIDEM TARTRATE 10 MG/1
10 TABLET ORAL NIGHTLY PRN
Qty: 30 TABLET | Refills: 5 | Status: SHIPPED | OUTPATIENT
Start: 2023-11-07

## 2023-11-07 RX ORDER — ERGOCALCIFEROL 1.25 MG/1
CAPSULE ORAL
Qty: 12 CAPSULE | Refills: 1 | Status: SHIPPED | OUTPATIENT
Start: 2023-11-07

## 2023-11-07 RX ORDER — ESCITALOPRAM OXALATE 10 MG/1
10 TABLET ORAL DAILY
Qty: 90 TABLET | Refills: 1 | Status: SHIPPED | OUTPATIENT
Start: 2023-11-07

## 2023-11-07 NOTE — PROGRESS NOTES
Chief Complaint  Chief Complaint   Patient presents with    Follow-up     6 month follow up HLD and chronic conditions           Subjective          Denisse Flores presents to [facility] for   History of Present Illness    Hyperlipidemia, previous lipids stable, atorvastatin was discontinued for trial and here to review labs today.  She continues eating healthy. She has family history of CAD. The patient denies muscle aches, constipation, diarrhea, GI upset, fatigue, chest pain/pressure, exercise intolerance, dyspnea, palpitations, syncope and pedal edema.       Insomnia, 2/2 chronic pain and her mind is not able to shut off, she wakes up frequently through the night, Ambien 10mg is sometimes effective. She tried ambien CR       Anxiety,  was recently in the hospital for an extended period of time, he is now at home, they have made significant lifestyle modifications and trying to be more healthy. She reports anxiety has improved some. Patient denies significant weight loss/gain, hypersomnia, psychomotor agitation, psychomotor retardation, fatigue (loss of energy), feelings of worthlessness (guilt), impaired concentration (indecisiveness), thoughts of death or suicide.       Chronic regional pain syndrome, chronic left knee pain, with history of L total knee replacement, mini revision and joint injections per Dr. Priest in past. Still w/ severe poor mobility and pain of the left thigh/knee. She underwent SCS on 3/29/22 per Dr. Young, mobility of the knee was unchanged and pain did not improve. The spinal cord stimulator caused more pain at the site and was later removed. She lives with pain 24/7, continues to try to stay active and do normal activities as the pain does not change one way or the other. She is planning repeat MRI and epidural per Dr. Young     She is now having R hip pain, she has been compensating for the pain in left leg/knee     She follows with [facility] for  chronic Left hand pain s/p fall, developed CRPS      Vitamin D deficiency, on weekly vitamin D     B12 deficiency, she resumed taking B complex daily     cologuard reordered 2022, not done yet.     Mammo negative 2023      The following portions of the patient's history were reviewed and updated as appropriate: allergies, current medications, past family history, past medical history, past social history, past surgical history and problem list.    Past Medical History:   Diagnosis Date    Allergic since childhood    Anxiety     Arthritis     Cancer     Environmental allergies     High cholesterol     History of panic attacks     History of skin cancer 1970'S    History of transfusion     HL (hearing loss)     Insomnia     Knee pain     Migraine     Urinary tract infection     Vitamin D deficiency      Past Surgical History:   Procedure Laterality Date     SECTION      X 3    CHOLECYSTECTOMY      DILATATION AND CURETTAGE      JOINT MANIPULATION Left 10/22/2019    Procedure: MANIPULATION OF KNEE with steriod injection;  Surgeon: Pedro Priest MD;  Location: [facility];  Service: Orthopedics    JOINT REPLACEMENT      KNEE MINI REVISION Left 08/15/2019    Procedure: LEFT TOTAL KNEE ARTHROPLASTY REVISION POLY CHANGE;  Surgeon: Pedro Priest MD;  Location: [facility] OR;  Service: Orthopedics    IL ARTHRP KNE CONDYLE&PLATU MEDIAL&LAT COMPARTMENTS Left 2017    Procedure: LT TOTAL KNEE ARTHROPLASTY;  Surgeon: Pedro Priest MD;  Location: [facility] OR;  Service: Orthopedics    SPINAL CORD STIMULATOR IMPLANT      Later removed    TOTAL ABDOMINAL HYSTERECTOMY WITH SALPINGO OOPHORECTOMY  2009    fibroids     Family History   Problem Relation Age of Onset    Migraines Mother     Heart attack Maternal Uncle     Diabetes Maternal Grandfather     Heart attack Maternal Grandfather      Social History     Tobacco Use    Smoking status: Never     Passive exposure: Never    Smokeless tobacco:  "Never   Substance Use Topics    Alcohol use: Yes     Alcohol/week: 0.0 standard drinks of alcohol     Comment: ABOUT 2X YEARLY       Current Outpatient Medications:     B Complex-C-Iron (Super B-Complex/Iron/Vitamin C) tablet, , Disp: , Rfl:     CBD oil (cannabidiol) capsule, Take  by mouth., Disp: , Rfl:     escitalopram (LEXAPRO) 10 MG tablet, Take 1 tablet by mouth Daily., Disp: 90 tablet, Rfl: 1    estradiol (ESTRACE) 1 MG tablet, TAKE 1 TABLET BY MOUTH DAILY, Disp: 90 tablet, Rfl: 1    Omega-3 Fatty Acids (FISH OIL) 1000 MG capsule capsule, Take 1 capsule by mouth Daily With Breakfast., Disp: , Rfl:     traMADol (ULTRAM) 50 MG tablet, TAKE 2 TABLETS BY MOUTH THREE TIMES DAILY AS NEEDED FOR PAIN, Disp: , Rfl:     vitamin C (ASCORBIC ACID) 500 MG tablet, Take 1 tablet by mouth Daily., Disp: , Rfl:     vitamin D (ERGOCALCIFEROL) 1.25 MG (91016 UT) capsule capsule, TAKE ONE CAPSULE BY MOUTH ONCE WEEKLY ON MONDAYS, Disp: 12 capsule, Rfl: 1    zolpidem (AMBIEN) 10 MG tablet, Take 1 tablet by mouth At Night As Needed for Sleep., Disp: 30 tablet, Rfl: 5    Objective   Vital Signs:   /70 (BP Location: Left arm, Patient Position: Sitting, Cuff Size: Adult)   Pulse 80   Ht 165.1 cm (65\")   Wt 67.1 kg (148 lb)   SpO2 95%   BMI 24.63 kg/m²           Physical Exam  Vitals and nursing note reviewed.   Constitutional:       General: She is not in acute distress.     Appearance: Normal appearance. She is well-developed. She is not ill-appearing or diaphoretic.   HENT:      Head: Normocephalic.   Eyes:      Conjunctiva/sclera: Conjunctivae normal.      Pupils: Pupils are equal, round, and reactive to light.   Neck:      Thyroid: No thyromegaly.      Vascular: No JVD.   Cardiovascular:      Rate and Rhythm: Normal rate and regular rhythm.      Heart sounds: Normal heart sounds. No murmur heard.  Pulmonary:      Effort: Pulmonary effort is normal. No respiratory distress.      Breath sounds: Normal breath sounds. No " wheezing or rhonchi.   Abdominal:      General: Bowel sounds are normal. There is no distension.      Palpations: Abdomen is soft.      Tenderness: There is no abdominal tenderness.   Musculoskeletal:         General: No swelling or tenderness. Normal range of motion.      Cervical back: Normal range of motion and neck supple. No tenderness.   Lymphadenopathy:      Cervical: No cervical adenopathy.   Skin:     General: Skin is warm and dry.      Coloration: Skin is not jaundiced.      Findings: No erythema or rash.   Neurological:      General: No focal deficit present.      Mental Status: She is alert and oriented to person, place, and time. Mental status is at baseline.      Sensory: No sensory deficit.      Gait: Gait abnormal (Due to chronic left knee/leg pain).   Psychiatric:         Attention and Perception: Attention normal.         Mood and Affect: Mood normal. Mood is not anxious or depressed.         Behavior: Behavior normal. Behavior is cooperative.         Thought Content: Thought content normal.         Judgment: Judgment normal.          Result Review :     Clinical Support on 11/03/2023   Component Date Value Ref Range Status    Total Cholesterol 11/03/2023 248 (H)  0 - 200 mg/dL Final    Triglycerides 11/03/2023 101  0 - 150 mg/dL Final    HDL Cholesterol 11/03/2023 67 (H)  40 - 60 mg/dL Final    LDL Cholesterol  11/03/2023 163 (H)  0 - 100 mg/dL Final    VLDL Cholesterol 11/03/2023 18  5 - 40 mg/dL Final    LDL/HDL Ratio 11/03/2023 2.40   Final    Glucose 11/03/2023 87  65 - 99 mg/dL Final    BUN 11/03/2023 14  8 - 23 mg/dL Final    Creatinine 11/03/2023 0.86  0.57 - 1.00 mg/dL Final    Sodium 11/03/2023 141  136 - 145 mmol/L Final    Potassium 11/03/2023 4.2  3.5 - 5.2 mmol/L Final    Chloride 11/03/2023 104  98 - 107 mmol/L Final    CO2 11/03/2023 28.8  22.0 - 29.0 mmol/L Final    Calcium 11/03/2023 9.7  8.6 - 10.5 mg/dL Final    Total Protein 11/03/2023 6.7  6.0 - 8.5 g/dL Final    Albumin  11/03/2023 4.1  3.5 - 5.2 g/dL Final    ALT (SGPT) 11/03/2023 18  1 - 33 U/L Final    AST (SGOT) 11/03/2023 23  1 - 32 U/L Final    Alkaline Phosphatase 11/03/2023 63  39 - 117 U/L Final    Total Bilirubin 11/03/2023 0.6  0.0 - 1.2 mg/dL Final    Globulin 11/03/2023 2.6  gm/dL Final    A/G Ratio 11/03/2023 1.6  g/dL Final    BUN/Creatinine Ratio 11/03/2023 16.3  7.0 - 25.0 Final    Anion Gap 11/03/2023 8.2  5.0 - 15.0 mmol/L Final    eGFR 11/03/2023 76.5  >60.0 mL/min/1.73 Final    WBC 11/03/2023 6.29  3.40 - 10.80 10*3/mm3 Final    RBC 11/03/2023 4.12  3.77 - 5.28 10*6/mm3 Final    Hemoglobin 11/03/2023 13.6  12.0 - 15.9 g/dL Final    Hematocrit 11/03/2023 39.9  34.0 - 46.6 % Final    MCV 11/03/2023 96.8  79.0 - 97.0 fL Final    MCH 11/03/2023 33.0  26.6 - 33.0 pg Final    MCHC 11/03/2023 34.1  31.5 - 35.7 g/dL Final    RDW 11/03/2023 11.7 (L)  12.3 - 15.4 % Final    RDW-SD 11/03/2023 41.4  37.0 - 54.0 fl Final    MPV 11/03/2023 10.6  6.0 - 12.0 fL Final    Platelets 11/03/2023 315  140 - 450 10*3/mm3 Final    TSH 11/03/2023 1.870  0.270 - 4.200 uIU/mL Final                  BMI is within normal parameters. No other follow-up for BMI required.           Assessment and Plan    Diagnoses and all orders for this visit:    1. Mixed hyperlipidemia (Primary)  Comments:  lipids reviewed, mostly stable, will allow another 6mo off statin and recheck  cont strict HHD and exercise as able    2. Complex regional pain syndrome type 1, affecting unspecified site    3. Vitamin B12 deficiency    4. Anxiety  Comments:  Stable but labile at times, continue Lexapro  Orders:  -     escitalopram (LEXAPRO) 10 MG tablet; Take 1 tablet by mouth Daily.  Dispense: 90 tablet; Refill: 1    5. Vitamin D deficiency  Comments:  Continue weekly vitamin D  Orders:  -     vitamin D (ERGOCALCIFEROL) 1.25 MG (49318 UT) capsule capsule; TAKE ONE CAPSULE BY MOUTH ONCE WEEKLY ON MONDAYS  Dispense: 12 capsule; Refill: 1    6. Insomnia, unspecified  type  Comments:  labile but d/t pain, cont and refill ambien for now.   Orders:  -     zolpidem (AMBIEN) 10 MG tablet; Take 1 tablet by mouth At Night As Needed for Sleep.  Dispense: 30 tablet; Refill: 5    7. Chronic pain of left knee        I spent 30 minutes caring for Denisse Flores on this date of service. This time includes time spent by me in the following activities: preparing for the visit, reviewing tests, performing a medically appropriate examination and/or evaluation , counseling and educating the patient/family/caregiver, ordering medications, tests, or procedures and documenting information in the medical record        Follow Up     Return in about 6 months (around 5/7/2024) for Recheck HLD, pain, insomnia. HTN panel, b12, vitamin D prior to appt.  Patient was given instructions and counseling regarding her condition or for health maintenance advice. Please see specific information pulled into the AVS if appropriate.        Part of this note may be an electronic transcription/translation of spoken language to printed text using the Dragon Dictation System

## 2023-12-29 DIAGNOSIS — E55.9 VITAMIN D DEFICIENCY: ICD-10-CM

## 2023-12-29 RX ORDER — ERGOCALCIFEROL 1.25 MG/1
CAPSULE ORAL
Qty: 12 CAPSULE | Refills: 1 | Status: SHIPPED | OUTPATIENT
Start: 2023-12-29

## 2024-05-10 DIAGNOSIS — F41.9 ANXIETY: ICD-10-CM

## 2024-05-10 DIAGNOSIS — Z78.0 POSTMENOPAUSAL: ICD-10-CM

## 2024-05-11 RX ORDER — ESCITALOPRAM OXALATE 10 MG/1
10 TABLET ORAL DAILY
Qty: 90 TABLET | Refills: 1 | Status: SHIPPED | OUTPATIENT
Start: 2024-05-11

## 2024-05-11 RX ORDER — ESTRADIOL 1 MG/1
1 TABLET ORAL DAILY
Qty: 90 TABLET | Refills: 1 | Status: SHIPPED | OUTPATIENT
Start: 2024-05-11

## 2024-05-14 DIAGNOSIS — G47.00 INSOMNIA, UNSPECIFIED TYPE: ICD-10-CM

## 2024-05-14 RX ORDER — ZOLPIDEM TARTRATE 10 MG/1
10 TABLET ORAL NIGHTLY PRN
Qty: 30 TABLET | Refills: 5 | Status: SHIPPED | OUTPATIENT
Start: 2024-05-14

## 2024-05-14 NOTE — TELEPHONE ENCOUNTER
PATIENT STATED SHE IS GOING OUT OF TOWN DUE TO FAMILY EMERGENCY FOR 2 WEEKS AND WOULD NEED THIS BEFORE LEAVING TOMORROW.    PLEASE CALL IF SENT.

## 2024-06-18 ENCOUNTER — OFFICE VISIT (OUTPATIENT)
Dept: FAMILY MEDICINE CLINIC | Facility: CLINIC | Age: 64
End: 2024-06-18
Payer: COMMERCIAL

## 2024-06-18 ENCOUNTER — LAB (OUTPATIENT)
Dept: FAMILY MEDICINE CLINIC | Facility: CLINIC | Age: 64
End: 2024-06-18
Payer: COMMERCIAL

## 2024-06-18 VITALS
OXYGEN SATURATION: 95 % | BODY MASS INDEX: 24.49 KG/M2 | DIASTOLIC BLOOD PRESSURE: 66 MMHG | WEIGHT: 147 LBS | HEIGHT: 65 IN | HEART RATE: 76 BPM | TEMPERATURE: 98.5 F | SYSTOLIC BLOOD PRESSURE: 111 MMHG

## 2024-06-18 DIAGNOSIS — Z00.00 PREVENTATIVE HEALTH CARE: ICD-10-CM

## 2024-06-18 DIAGNOSIS — L30.9 DERMATITIS: Primary | ICD-10-CM

## 2024-06-18 DIAGNOSIS — E55.9 VITAMIN D DEFICIENCY: ICD-10-CM

## 2024-06-18 DIAGNOSIS — E53.8 B12 DEFICIENCY: ICD-10-CM

## 2024-06-18 PROCEDURE — 99214 OFFICE O/P EST MOD 30 MIN: CPT | Performed by: NURSE PRACTITIONER

## 2024-06-18 PROCEDURE — 80050 GENERAL HEALTH PANEL: CPT | Performed by: NURSE PRACTITIONER

## 2024-06-18 PROCEDURE — 36415 COLL VENOUS BLD VENIPUNCTURE: CPT | Performed by: NURSE PRACTITIONER

## 2024-06-18 PROCEDURE — 82306 VITAMIN D 25 HYDROXY: CPT | Performed by: NURSE PRACTITIONER

## 2024-06-18 PROCEDURE — 82607 VITAMIN B-12: CPT | Performed by: NURSE PRACTITIONER

## 2024-06-18 PROCEDURE — 80061 LIPID PANEL: CPT | Performed by: NURSE PRACTITIONER

## 2024-06-18 RX ORDER — METHYLPREDNISOLONE 4 MG/1
TABLET ORAL
Qty: 21 TABLET | Refills: 0 | Status: SHIPPED | OUTPATIENT
Start: 2024-06-18

## 2024-06-19 LAB
25(OH)D3 SERPL-MCNC: 42.4 NG/ML (ref 30–100)
ALBUMIN SERPL-MCNC: 4.1 G/DL (ref 3.5–5.2)
ALBUMIN/GLOB SERPL: 1.6 G/DL
ALP SERPL-CCNC: 59 U/L (ref 39–117)
ALT SERPL W P-5'-P-CCNC: 19 U/L (ref 1–33)
ANION GAP SERPL CALCULATED.3IONS-SCNC: 7.8 MMOL/L (ref 5–15)
AST SERPL-CCNC: 20 U/L (ref 1–32)
BASOPHILS # BLD AUTO: 0.05 10*3/MM3 (ref 0–0.2)
BASOPHILS NFR BLD AUTO: 0.8 % (ref 0–1.5)
BILIRUB SERPL-MCNC: 0.3 MG/DL (ref 0–1.2)
BUN SERPL-MCNC: 10 MG/DL (ref 8–23)
BUN/CREAT SERPL: 13.3 (ref 7–25)
CALCIUM SPEC-SCNC: 9.3 MG/DL (ref 8.6–10.5)
CHLORIDE SERPL-SCNC: 107 MMOL/L (ref 98–107)
CHOLEST SERPL-MCNC: 242 MG/DL (ref 0–200)
CO2 SERPL-SCNC: 27.2 MMOL/L (ref 22–29)
CREAT SERPL-MCNC: 0.75 MG/DL (ref 0.57–1)
DEPRECATED RDW RBC AUTO: 42.2 FL (ref 37–54)
EGFRCR SERPLBLD CKD-EPI 2021: 90.1 ML/MIN/1.73
EOSINOPHIL # BLD AUTO: 0.41 10*3/MM3 (ref 0–0.4)
EOSINOPHIL NFR BLD AUTO: 6.9 % (ref 0.3–6.2)
ERYTHROCYTE [DISTWIDTH] IN BLOOD BY AUTOMATED COUNT: 11.9 % (ref 12.3–15.4)
GLOBULIN UR ELPH-MCNC: 2.5 GM/DL
GLUCOSE SERPL-MCNC: 84 MG/DL (ref 65–99)
HCT VFR BLD AUTO: 38.9 % (ref 34–46.6)
HDLC SERPL-MCNC: 71 MG/DL (ref 40–60)
HGB BLD-MCNC: 13.2 G/DL (ref 12–15.9)
IMM GRANULOCYTES # BLD AUTO: 0.01 10*3/MM3 (ref 0–0.05)
IMM GRANULOCYTES NFR BLD AUTO: 0.2 % (ref 0–0.5)
LDLC SERPL CALC-MCNC: 148 MG/DL (ref 0–100)
LDLC/HDLC SERPL: 2.05 {RATIO}
LYMPHOCYTES # BLD AUTO: 1.53 10*3/MM3 (ref 0.7–3.1)
LYMPHOCYTES NFR BLD AUTO: 25.7 % (ref 19.6–45.3)
MCH RBC QN AUTO: 32.8 PG (ref 26.6–33)
MCHC RBC AUTO-ENTMCNC: 33.9 G/DL (ref 31.5–35.7)
MCV RBC AUTO: 96.5 FL (ref 79–97)
MONOCYTES # BLD AUTO: 0.64 10*3/MM3 (ref 0.1–0.9)
MONOCYTES NFR BLD AUTO: 10.8 % (ref 5–12)
NEUTROPHILS NFR BLD AUTO: 3.31 10*3/MM3 (ref 1.7–7)
NEUTROPHILS NFR BLD AUTO: 55.6 % (ref 42.7–76)
NRBC BLD AUTO-RTO: 0 /100 WBC (ref 0–0.2)
PLATELET # BLD AUTO: 264 10*3/MM3 (ref 140–450)
PMV BLD AUTO: 10.7 FL (ref 6–12)
POTASSIUM SERPL-SCNC: 3.8 MMOL/L (ref 3.5–5.2)
PROT SERPL-MCNC: 6.6 G/DL (ref 6–8.5)
RBC # BLD AUTO: 4.03 10*6/MM3 (ref 3.77–5.28)
SODIUM SERPL-SCNC: 142 MMOL/L (ref 136–145)
TRIGL SERPL-MCNC: 129 MG/DL (ref 0–150)
TSH SERPL DL<=0.05 MIU/L-ACNC: 2.37 UIU/ML (ref 0.27–4.2)
VIT B12 BLD-MCNC: 367 PG/ML (ref 211–946)
VLDLC SERPL-MCNC: 23 MG/DL (ref 5–40)
WBC NRBC COR # BLD AUTO: 5.95 10*3/MM3 (ref 3.4–10.8)

## 2024-06-21 ENCOUNTER — TELEPHONE (OUTPATIENT)
Dept: FAMILY MEDICINE CLINIC | Facility: CLINIC | Age: 64
End: 2024-06-21

## 2024-06-21 NOTE — TELEPHONE ENCOUNTER
"     Caller: Denisse Flores \"Sehna\"     Relationship: [unfilled]     Best call back number: [phone]     What is your medical concern?   STOMACH PROBLEMS   How long has this issue been going on? SEVERAL DAYS     Is your provider already aware of this issue? UNKNOWN    PHARM/  "

## 2024-10-17 ENCOUNTER — TELEPHONE (OUTPATIENT)
Dept: FAMILY MEDICINE CLINIC | Facility: CLINIC | Age: 64
End: 2024-10-17
Payer: COMMERCIAL

## 2024-10-17 PROCEDURE — 87186 SC STD MICRODIL/AGAR DIL: CPT | Performed by: NURSE PRACTITIONER

## 2024-10-17 PROCEDURE — 87086 URINE CULTURE/COLONY COUNT: CPT | Performed by: NURSE PRACTITIONER

## 2024-10-17 PROCEDURE — 87088 URINE BACTERIA CULTURE: CPT | Performed by: NURSE PRACTITIONER

## 2024-10-17 RX ORDER — CIPROFLOXACIN 250 MG/1
250 TABLET, FILM COATED ORAL 2 TIMES DAILY
Qty: 10 TABLET | Refills: 0 | Status: SHIPPED | OUTPATIENT
Start: 2024-10-17

## 2024-10-17 NOTE — TELEPHONE ENCOUNTER
Spoke with patient and let her know she can stop in to leave a urine sample. She is going to stop sometime today to leave a sample.

## 2024-11-14 DIAGNOSIS — G47.00 INSOMNIA, UNSPECIFIED TYPE: ICD-10-CM

## 2024-11-14 RX ORDER — ZOLPIDEM TARTRATE 10 MG/1
10 TABLET ORAL NIGHTLY PRN
Qty: 30 TABLET | Refills: 0 | Status: SHIPPED | OUTPATIENT
Start: 2024-11-14

## 2024-11-29 DIAGNOSIS — E55.9 VITAMIN D DEFICIENCY: ICD-10-CM

## 2024-12-02 RX ORDER — ERGOCALCIFEROL 1.25 MG/1
CAPSULE, LIQUID FILLED ORAL
Qty: 12 CAPSULE | Refills: 1 | Status: SHIPPED | OUTPATIENT
Start: 2024-12-02

## 2024-12-18 ENCOUNTER — OFFICE VISIT (OUTPATIENT)
Dept: FAMILY MEDICINE CLINIC | Facility: CLINIC | Age: 64
End: 2024-12-18
Payer: COMMERCIAL

## 2024-12-18 VITALS
DIASTOLIC BLOOD PRESSURE: 71 MMHG | OXYGEN SATURATION: 99 % | WEIGHT: 149.2 LBS | BODY MASS INDEX: 24.86 KG/M2 | HEART RATE: 73 BPM | HEIGHT: 65 IN | SYSTOLIC BLOOD PRESSURE: 110 MMHG

## 2024-12-18 DIAGNOSIS — G47.00 INSOMNIA, UNSPECIFIED TYPE: ICD-10-CM

## 2024-12-18 DIAGNOSIS — F41.9 ANXIETY: ICD-10-CM

## 2024-12-18 DIAGNOSIS — Z78.0 POSTMENOPAUSAL: ICD-10-CM

## 2024-12-18 DIAGNOSIS — E78.2 MIXED HYPERLIPIDEMIA: ICD-10-CM

## 2024-12-18 DIAGNOSIS — G90.50 COMPLEX REGIONAL PAIN SYNDROME TYPE 1, AFFECTING UNSPECIFIED SITE: ICD-10-CM

## 2024-12-18 DIAGNOSIS — Z12.31 BREAST CANCER SCREENING BY MAMMOGRAM: ICD-10-CM

## 2024-12-18 DIAGNOSIS — R60.0 BILATERAL LOWER EXTREMITY EDEMA: Primary | ICD-10-CM

## 2024-12-18 DIAGNOSIS — Z12.11 COLON CANCER SCREENING: ICD-10-CM

## 2024-12-18 PROCEDURE — 99214 OFFICE O/P EST MOD 30 MIN: CPT | Performed by: NURSE PRACTITIONER

## 2024-12-18 RX ORDER — ZOLPIDEM TARTRATE 10 MG/1
10 TABLET ORAL NIGHTLY PRN
Qty: 30 TABLET | Refills: 5 | Status: SHIPPED | OUTPATIENT
Start: 2024-12-18

## 2024-12-18 RX ORDER — PREGABALIN 75 MG/1
1 CAPSULE ORAL EVERY 12 HOURS SCHEDULED
COMMUNITY
Start: 2024-12-11

## 2024-12-18 NOTE — PROGRESS NOTES
Chief Complaint  Chief Complaint   Patient presents with    Follow-up    Hyperlipidemia    Insomnia     Doing okay on ambien           Subjective          Denisse Flores presents to [facility] for   History of Present Illness    Hyperlipidemia, atorvastatin discontinued after stable lipids, she follows a healthy heart diet, no added salt, exercises as tolerated.  The patient denies muscle aches, constipation, diarrhea, GI upset, fatigue, chest pain/pressure, dyspnea, palpitations, syncope.      Complex regional pain syndrome, chronic left knee pain, with history of L total knee replacement, mini revision and joint injections per Dr. Priest. Still w/ severe poor mobility and pain of the left thigh/knee. She underwent SCS on 3/29/22 per Dr. Young, mobility of the knee was unchanged and pain did not improve, SCS caused more pain at the site and was removed. She lives with pain 24/7, continues to try to stay active and do normal activities as the pain does not change one way or the other. Tramadol was recently d/c'd and pregabalin increased she reports she is sleeping better and pain has improved slightly.     Has seen x3 ortho for opinions on L knee, has been told she needs a repeat total L knee replacement per Dr. Evans. She does reports swelling of BLE, stopped tea, drinks mainly water, does not salt food      She follows with [facility] for chronic Left hand pain/CRPS s/p fall    Anxiety, stable on Lexapro, patient denies significant weight loss/gain, hypersomnia, psychomotor agitation, psychomotor retardation, fatigue (loss of energy), feelings of worthlessness (guilt), impaired concentration (indecisiveness), thoughts of death or suicide.       Insomnia, secondary to pain, stable with Ambien nightly, needs medication refill      Vitamin D deficiency, on weekly vitamin D     B12 deficiency, she is taking B complex daily     cologuard reordered April 2022, not done yet, she forgot about  it.      Mammo negative 2023      The following portions of the patient's history were reviewed and updated as appropriate: allergies, current medications, past family history, past medical history, past social history, past surgical history and problem list.    Past Medical History:   Diagnosis Date    Allergic since childhood    Anxiety     Arthritis     Cancer     Cholelithiasis Removed    Environmental allergies     High cholesterol     History of panic attacks     History of skin cancer 1970'S    History of transfusion     HL (hearing loss)     Insomnia     Knee pain     Migraine     Urinary tract infection     Vitamin D deficiency      Past Surgical History:   Procedure Laterality Date     SECTION      X 3    CHOLECYSTECTOMY      DILATATION AND CURETTAGE      JOINT MANIPULATION Left 10/22/2019    Procedure: MANIPULATION OF KNEE with steriod injection;  Surgeon: Pedro Priest MD;  Location: [facility];  Service: Orthopedics    JOINT REPLACEMENT      KNEE MINI REVISION Left 08/15/2019    Procedure: LEFT TOTAL KNEE ARTHROPLASTY REVISION POLY CHANGE;  Surgeon: Pedro Priest MD;  Location: [facility];  Service: Orthopedics    LA ARTHRP KNE CONDYLE&PLATU MEDIAL&LAT COMPARTMENTS Left 2017    Procedure: LT TOTAL KNEE ARTHROPLASTY;  Surgeon: Pedro Priest MD;  Location: [facility];  Service: Orthopedics    SPINAL CORD STIMULATOR IMPLANT      Later removed    TOTAL ABDOMINAL HYSTERECTOMY WITH SALPINGO OOPHORECTOMY  2009    fibroids     Family History   Problem Relation Age of Onset    Migraines Mother     Heart attack Maternal Uncle     Diabetes Maternal Grandmother     Heart disease Maternal Grandmother     Diabetes Maternal Grandfather     Heart attack Maternal Grandfather      Social History     Tobacco Use    Smoking status: Never     Passive exposure: Never    Smokeless tobacco: Never   Substance Use Topics    Alcohol use: Not Currently     Comment: ABOUT 2X YEARLY  "      Current Outpatient Medications:     B Complex-C-Iron (Super B-Complex/Iron/Vitamin C) tablet, , Disp: , Rfl:     escitalopram (LEXAPRO) 10 MG tablet, TAKE 1 TABLET BY MOUTH DAILY, Disp: 90 tablet, Rfl: 1    estradiol (ESTRACE) 1 MG tablet, TAKE 1 TABLET BY MOUTH DAILY, Disp: 90 tablet, Rfl: 1    Omega-3 Fatty Acids (FISH OIL) 1000 MG capsule capsule, Take 1 capsule by mouth Daily With Breakfast., Disp: , Rfl:     pregabalin (LYRICA) 75 MG capsule, Take 1 capsule by mouth Every 12 (Twelve) Hours., Disp: , Rfl:     vitamin C (ASCORBIC ACID) 500 MG tablet, Take 1 tablet by mouth Daily., Disp: , Rfl:     vitamin D (ERGOCALCIFEROL) 1.25 MG (65946 UT) capsule capsule, TAKE ONE CAPSULE BY MOUTH ONCE WEEKLY ON MONDAYS, Disp: 12 capsule, Rfl: 1    zolpidem (AMBIEN) 10 MG tablet, Take 1 tablet by mouth At Night As Needed for Sleep., Disp: 30 tablet, Rfl: 5    Objective   Vital Signs:   Vitals:    12/18/24 1129   BP: 110/71   BP Location: Left arm   Patient Position: Sitting   Cuff Size: Adult   Pulse: 73   SpO2: 99%   Weight: 67.7 kg (149 lb 3.2 oz)   Height: 165.1 cm (65\")   PainSc:   4   PainLoc: Knee       Body mass index is 24.83 kg/m².    Physical Exam  Vitals and nursing note reviewed.   Constitutional:       General: She is not in acute distress.     Appearance: Normal appearance. She is well-developed. She is not ill-appearing or diaphoretic.   HENT:      Head: Normocephalic.   Eyes:      Conjunctiva/sclera: Conjunctivae normal.      Pupils: Pupils are equal, round, and reactive to light.   Neck:      Thyroid: No thyromegaly.      Vascular: No JVD.   Cardiovascular:      Rate and Rhythm: Normal rate and regular rhythm.      Heart sounds: Normal heart sounds. No murmur heard.  Pulmonary:      Effort: Pulmonary effort is normal. No respiratory distress.      Breath sounds: Normal breath sounds. No wheezing or rhonchi.   Abdominal:      General: Bowel sounds are normal. There is no distension.      Palpations: " Abdomen is soft.      Tenderness: There is no abdominal tenderness.   Musculoskeletal:         General: Swelling (non pitting BLE) and tenderness (L knee/leg chronic pain) present. Normal range of motion.      Cervical back: Normal range of motion and neck supple. No tenderness.   Lymphadenopathy:      Cervical: No cervical adenopathy.   Skin:     General: Skin is warm and dry.      Coloration: Skin is not jaundiced.      Findings: No erythema or rash.   Neurological:      General: No focal deficit present.      Mental Status: She is alert and oriented to person, place, and time. Mental status is at baseline.      Sensory: No sensory deficit.      Gait: Gait abnormal (Due to chronic left knee/leg pain).   Psychiatric:         Attention and Perception: Attention normal.         Mood and Affect: Mood normal. Mood is not anxious or depressed.         Behavior: Behavior normal. Behavior is cooperative.         Thought Content: Thought content normal.         Judgment: Judgment normal.          Result Review :     No visits with results within 7 Day(s) from this visit.   Latest known visit with results is:   Appointment on 10/17/2024   Component Date Value Ref Range Status    Color 10/17/2024 Yellow  Yellow, Straw, Dark Yellow, Tereza Final    Clarity, UA 10/17/2024 Cloudy (A)  Clear Final    Glucose, UA 10/17/2024 Negative  Negative mg/dL Final    Bilirubin 10/17/2024 Negative  Negative Final    Ketones, UA 10/17/2024 Negative  Negative Final    Specific Gravity  10/17/2024 1.005  1.005 - 1.030 Final    Blood, UA 10/17/2024 Trace (A)  Negative Final    pH, Urine 10/17/2024 6.5  5.0 - 8.0 Final    Protein, POC 10/17/2024 Negative  Negative mg/dL Final    Urobilinogen, UA 10/17/2024 0.2 E.U./dL  Normal, 0.2 E.U./dL Final    Leukocytes 10/17/2024 Moderate (2+) (A)  Negative Final    Nitrite, UA 10/17/2024 Negative  Negative Final    Urine Culture 10/17/2024 50,000 CFU/mL Escherichia coli (A)   Final                  BMI is  within normal parameters. No other follow-up for BMI required.           Assessment and Plan    Diagnoses and all orders for this visit:    1. Bilateral lower extremity edema (Primary)  Comments:  Watch labels, keep sodium less than 2000 mg/day  Increase protein  Recommend 2 L of water per day  Recommend compression stockings and elevation of BLE as naina    2. Anxiety  Comments:  Stable but labile at times, continue Lexapro    3. Postmenopausal  Comments:  cont estradiol, may try cutting in half for trial    4. Insomnia, unspecified type  Comments:  labile but d/t pain, cont and refill ambien  Orders:  -     zolpidem (AMBIEN) 10 MG tablet; Take 1 tablet by mouth At Night As Needed for Sleep.  Dispense: 30 tablet; Refill: 5    5. Breast cancer screening by mammogram  -     Mammo Screening Digital Tomosynthesis Bilateral With CAD; Future    6. Colon cancer screening  -     Cologuard - Stool, Per Rectum; Future    7. Complex regional pain syndrome type 1, affecting unspecified site  Comments:  cont with pain mgmt Dr. Young  Pain somewhat improved w/ pregabalin titration  No longer using tramadol    8. Mixed hyperlipidemia  Comments:  lipids stable off  atorvastatin  check lipids next visit    Reordered Cologuard  Patient has overabundant supply of Lexapro and estradiol, will refill when needed      I spent 30 minutes caring for Denisse Flores on this date of service. This time includes time spent by me in the following activities: preparing for the visit, reviewing tests, performing a medically appropriate examination and/or evaluation , counseling and educating the patient/family/caregiver, ordering medications, tests, or procedures and documenting information in the medical record        Follow Up     Return in about 6 months (around 6/18/2025) for Recheck, Annual physical.  Patient was given instructions and counseling regarding her condition or for health maintenance advice. Please see specific information pulled  into the AVS if appropriate.        Part of this note may be an electronic transcription/translation of spoken language to printed text using the Dragon Dictation System

## 2024-12-19 DIAGNOSIS — G47.00 INSOMNIA, UNSPECIFIED TYPE: ICD-10-CM

## 2024-12-19 RX ORDER — ZOLPIDEM TARTRATE 10 MG/1
10 TABLET ORAL NIGHTLY PRN
Qty: 30 TABLET | OUTPATIENT
Start: 2024-12-19

## 2024-12-19 RX ORDER — ZOLPIDEM TARTRATE 10 MG/1
10 TABLET ORAL NIGHTLY PRN
Qty: 30 TABLET | Refills: 5 | OUTPATIENT
Start: 2024-12-19

## 2024-12-19 NOTE — TELEPHONE ENCOUNTER
"  Caller: Denisse Flores \"Shena\"    Relationship: Self    Best call back number: [phone]    Requested Prescriptions:   Requested Prescriptions     Pending Prescriptions Disp Refills    zolpidem (AMBIEN) 10 MG tablet 30 tablet 5     Sig: Take 1 tablet by mouth At Night As Needed for Sleep.        Pharmacy where request should be sent: [id number] [address] AT [address] [place] [address] & [facility] - [phone]  - [phone]      Last office visit with prescribing clinician: 12/18/2024   Last telemedicine visit with prescribing clinician: Visit date not found   Next office visit with prescribing clinician: 6/18/2025     Additional details provided by patient:         Bre Vee, DUARTE   12/19/24 10:50 EST     "

## 2025-01-15 ENCOUNTER — HOSPITAL ENCOUNTER (OUTPATIENT)
Dept: MAMMOGRAPHY | Facility: HOSPITAL | Age: 65
Discharge: HOME | End: 2025-01-15
Admitting: NURSE PRACTITIONER
Payer: COMMERCIAL

## 2025-01-15 DIAGNOSIS — Z12.31 BREAST CANCER SCREENING BY MAMMOGRAM: ICD-10-CM

## 2025-01-15 PROCEDURE — 77063 BREAST TOMOSYNTHESIS BI: CPT

## 2025-01-15 PROCEDURE — 77067 SCR MAMMO BI INCL CAD: CPT

## 2025-02-05 DIAGNOSIS — Z78.0 POSTMENOPAUSAL: ICD-10-CM

## 2025-02-05 DIAGNOSIS — F41.9 ANXIETY: ICD-10-CM

## 2025-02-05 RX ORDER — ESCITALOPRAM OXALATE 10 MG/1
10 TABLET ORAL DAILY
Qty: 90 TABLET | Refills: 1 | Status: SHIPPED | OUTPATIENT
Start: 2025-02-05

## 2025-02-05 RX ORDER — ESTRADIOL 1 MG/1
1 TABLET ORAL DAILY
Qty: 90 TABLET | Refills: 1 | Status: SHIPPED | OUTPATIENT
Start: 2025-02-05

## 2025-03-25 DIAGNOSIS — E55.9 VITAMIN D DEFICIENCY: ICD-10-CM

## 2025-03-25 RX ORDER — ERGOCALCIFEROL 1.25 MG/1
CAPSULE, LIQUID FILLED ORAL
Qty: 12 CAPSULE | Refills: 1 | Status: SHIPPED | OUTPATIENT
Start: 2025-03-25

## 2025-04-21 ENCOUNTER — PATIENT MESSAGE (OUTPATIENT)
Dept: FAMILY MEDICINE CLINIC | Facility: CLINIC | Age: 65
End: 2025-04-21

## 2025-06-05 DIAGNOSIS — E78.2 MIXED HYPERLIPIDEMIA: Primary | ICD-10-CM

## 2025-06-11 ENCOUNTER — LAB (OUTPATIENT)
Dept: FAMILY MEDICINE CLINIC | Facility: CLINIC | Age: 65
End: 2025-06-11
Payer: COMMERCIAL

## 2025-06-11 LAB
DEPRECATED RDW RBC AUTO: 44.8 FL (ref 37–54)
ERYTHROCYTE [DISTWIDTH] IN BLOOD BY AUTOMATED COUNT: 12.5 % (ref 12.3–15.4)
HCT VFR BLD AUTO: 38.2 % (ref 34–46.6)
HGB BLD-MCNC: 12.6 G/DL (ref 12–15.9)
MCH RBC QN AUTO: 32.6 PG (ref 26.6–33)
MCHC RBC AUTO-ENTMCNC: 33 G/DL (ref 31.5–35.7)
MCV RBC AUTO: 99 FL (ref 79–97)
PLATELET # BLD AUTO: 283 10*3/MM3 (ref 140–450)
PMV BLD AUTO: 10.7 FL (ref 6–12)
RBC # BLD AUTO: 3.86 10*6/MM3 (ref 3.77–5.28)
WBC NRBC COR # BLD AUTO: 5.22 10*3/MM3 (ref 3.4–10.8)

## 2025-06-11 PROCEDURE — 80050 GENERAL HEALTH PANEL: CPT | Performed by: NURSE PRACTITIONER

## 2025-06-11 PROCEDURE — 36415 COLL VENOUS BLD VENIPUNCTURE: CPT | Performed by: NURSE PRACTITIONER

## 2025-06-11 PROCEDURE — 80061 LIPID PANEL: CPT | Performed by: NURSE PRACTITIONER

## 2025-06-11 NOTE — TELEPHONE ENCOUNTER
Attempted to reach Denisse, voicemail not set up Inquiring if received cologuard kit or is needing another one sent. Instruct if does have kit, to complete at earliest convenience.    Relay

## 2025-06-12 LAB
ALBUMIN SERPL-MCNC: 4.1 G/DL (ref 3.5–5.2)
ALBUMIN/GLOB SERPL: 1.4 G/DL
ALP SERPL-CCNC: 57 U/L (ref 39–117)
ALT SERPL W P-5'-P-CCNC: 17 U/L (ref 1–33)
ANION GAP SERPL CALCULATED.3IONS-SCNC: 11 MMOL/L (ref 5–15)
AST SERPL-CCNC: 27 U/L (ref 1–32)
BILIRUB SERPL-MCNC: 0.3 MG/DL (ref 0–1.2)
BUN SERPL-MCNC: 18 MG/DL (ref 8–23)
BUN/CREAT SERPL: 24.7 (ref 7–25)
CALCIUM SPEC-SCNC: 9.3 MG/DL (ref 8.6–10.5)
CHLORIDE SERPL-SCNC: 106 MMOL/L (ref 98–107)
CHOLEST SERPL-MCNC: 243 MG/DL (ref 0–200)
CO2 SERPL-SCNC: 24 MMOL/L (ref 22–29)
CREAT SERPL-MCNC: 0.73 MG/DL (ref 0.57–1)
EGFRCR SERPLBLD CKD-EPI 2021: 92.5 ML/MIN/1.73
GLOBULIN UR ELPH-MCNC: 3 GM/DL
GLUCOSE SERPL-MCNC: 86 MG/DL (ref 65–99)
HDLC SERPL-MCNC: 60 MG/DL (ref 40–60)
LDLC SERPL CALC-MCNC: 166 MG/DL (ref 0–100)
LDLC/HDLC SERPL: 2.72 {RATIO}
POTASSIUM SERPL-SCNC: 4.2 MMOL/L (ref 3.5–5.2)
PROT SERPL-MCNC: 7.1 G/DL (ref 6–8.5)
SODIUM SERPL-SCNC: 141 MMOL/L (ref 136–145)
TRIGL SERPL-MCNC: 99 MG/DL (ref 0–150)
TSH SERPL DL<=0.05 MIU/L-ACNC: 3.26 UIU/ML (ref 0.27–4.2)
VLDLC SERPL-MCNC: 17 MG/DL (ref 5–40)

## 2025-06-18 ENCOUNTER — OFFICE VISIT (OUTPATIENT)
Dept: FAMILY MEDICINE CLINIC | Facility: CLINIC | Age: 65
End: 2025-06-18
Payer: COMMERCIAL

## 2025-06-18 VITALS
HEIGHT: 65 IN | WEIGHT: 151.6 LBS | HEART RATE: 74 BPM | DIASTOLIC BLOOD PRESSURE: 70 MMHG | BODY MASS INDEX: 25.26 KG/M2 | OXYGEN SATURATION: 96 % | SYSTOLIC BLOOD PRESSURE: 110 MMHG | TEMPERATURE: 98.7 F

## 2025-06-18 DIAGNOSIS — E78.2 MIXED HYPERLIPIDEMIA: ICD-10-CM

## 2025-06-18 DIAGNOSIS — F41.9 ANXIETY: ICD-10-CM

## 2025-06-18 DIAGNOSIS — Z78.0 POSTMENOPAUSAL: ICD-10-CM

## 2025-06-18 DIAGNOSIS — L82.1 SK (SEBORRHEIC KERATOSIS): ICD-10-CM

## 2025-06-18 DIAGNOSIS — E55.9 VITAMIN D DEFICIENCY: ICD-10-CM

## 2025-06-18 DIAGNOSIS — R60.0 BILATERAL LOWER EXTREMITY EDEMA: ICD-10-CM

## 2025-06-18 DIAGNOSIS — G47.00 INSOMNIA, UNSPECIFIED TYPE: ICD-10-CM

## 2025-06-18 DIAGNOSIS — G90.50 COMPLEX REGIONAL PAIN SYNDROME TYPE 1, AFFECTING UNSPECIFIED SITE: ICD-10-CM

## 2025-06-18 DIAGNOSIS — Z00.00 PREVENTATIVE HEALTH CARE: Primary | ICD-10-CM

## 2025-06-18 PROCEDURE — 99396 PREV VISIT EST AGE 40-64: CPT | Performed by: NURSE PRACTITIONER

## 2025-06-18 RX ORDER — SODIUM PHOSPHATE,MONO-DIBASIC 19G-7G/118
1 ENEMA (ML) RECTAL 2 TIMES DAILY WITH MEALS
COMMUNITY

## 2025-06-18 RX ORDER — ZOLPIDEM TARTRATE 10 MG/1
10 TABLET ORAL NIGHTLY PRN
Qty: 30 TABLET | Refills: 5 | Status: SHIPPED | OUTPATIENT
Start: 2025-06-18

## 2025-06-18 RX ORDER — ESTRADIOL 1 MG/1
1 TABLET ORAL DAILY
Qty: 90 TABLET | Refills: 1 | Status: SHIPPED | OUTPATIENT
Start: 2025-06-18

## 2025-06-18 RX ORDER — NICOTINE POLACRILEX 2 MG
1 GUM BUCCAL DAILY
COMMUNITY

## 2025-06-18 NOTE — PROGRESS NOTES
"Chief Complaint  Chief Complaint   Patient presents with    Annual Exam    Leg Swelling     F/up    Skin Lesion     Front anterior mid chest           Subjective          Denisse Flores presents to [facility] for   History of Present Illness    64-year-old female patient presents for annual exam.  History of total hysterectomy on oral HRT. She complains of swelling of both legs, resolves with elevation. She c/o of a crusty, dry, lesion on the chest.     Hyperlipidemia, atorvastatin discontinued after stable lipids, she follows a healthy heart diet, no added salt, exercises as tolerated. She reports swelling of the BLE. The patient denies muscle aches, constipation, diarrhea, GI upset, fatigue, chest pain/pressure, dyspnea, palpitations, syncope.       Complex regional pain syndrome, chronic left knee pain, with history of L total knee replacement, mini revision and joint injections per Dr. Priest. Still w/ severe poor mobility and pain of the left thigh/knee. She underwent SCS on 3/29/22 per Dr. Young, mobility of the knee was unchanged and pain did not improve, SCS caused more pain at the site and was removed. She already taking glucosamine, lives with pain 24/7 but has significantly improved.  Continues to try to stay active and do normal activities as the pain does not change one way or the other.  She is no longer taking tramadol or pregabalin since starting glucosamine.        Has seen x3 ortho for opinions on L knee, has been told she needs a repeat total L knee replacement per Dr. vEans. She currently reports swelling of BLE, stopped tea, drinks mainly water, does not salt food. She limps.       She follows with [facility] for chronic Left hand pain/CRPS s/p fall     Anxiety, stable on Lexapro, she mainly takes d/t stressors with her , calls it \"my  pill\". Patient denies significant weight loss/gain, hypersomnia, psychomotor agitation, psychomotor retardation, fatigue " (loss of energy), feelings of worthlessness (guilt), impaired concentration (indecisiveness), thoughts of death or suicide.       Insomnia, improved some, stopped drinking tea, wakes secondary to pain and her husbands restless sleeping, stable with Ambien nightly, needs medication refill      Vitamin D deficiency, on weekly vitamin D     B12 deficiency, she is taking B complex daily     cologuard reordered 2022 and Dec 2024, not done yet, she forgot about it.      Mammo negative 2025       The following portions of the patient's history were reviewed and updated as appropriate: allergies, current medications, past family history, past medical history, past social history, past surgical history and problem list.    Past Medical History:   Diagnosis Date    Allergic since childhood    Anxiety     Arthritis     Cancer     Cholelithiasis Removed    Difficulty walking     Environmental allergies     Headache, tension-type     High cholesterol     History of panic attacks     History of skin cancer     History of transfusion     HL (hearing loss)     Insomnia     Knee pain     Migraine     Urinary tract infection     Vision loss     Vitamin D deficiency      Past Surgical History:   Procedure Laterality Date     SECTION      X 3    CHOLECYSTECTOMY      DILATATION AND CURETTAGE      JOINT MANIPULATION Left 10/22/2019    Procedure: MANIPULATION OF KNEE with steriod injection;  Surgeon: Pedro Priest MD;  Location: [facility];  Service: Orthopedics    JOINT REPLACEMENT      KNEE MINI REVISION Left 08/15/2019    Procedure: LEFT TOTAL KNEE ARTHROPLASTY REVISION POLY CHANGE;  Surgeon: Pedro Priest MD;  Location: [facility];  Service: Orthopedics    NE ARTHRP KNE CONDYLE&PLATU MEDIAL&LAT COMPARTMENTS Left 2017    Procedure: LT TOTAL KNEE ARTHROPLASTY;  Surgeon: Pedro Priest MD;  Location: [facility];  Service: Orthopedics    SPINAL CORD STIMULATOR IMPLANT      Later  removed    TOTAL ABDOMINAL HYSTERECTOMY WITH SALPINGO OOPHORECTOMY  2009    fibroids     Family History   Problem Relation Age of Onset    Migraines Mother     Heart attack Maternal Uncle     Diabetes Maternal Grandmother     Heart disease Maternal Grandmother     Dementia Maternal Grandmother     Diabetes Maternal Grandfather     Heart attack Maternal Grandfather      Social History     Tobacco Use    Smoking status: Never     Passive exposure: Never    Smokeless tobacco: Never   Substance Use Topics    Alcohol use: Not Currently     Comment: ABOUT 2X YEARLY       Current Outpatient Medications:     B Complex-C-Iron (Super B-Complex/Iron/Vitamin C) tablet, , Disp: , Rfl:     Biotin 1 MG capsule, Take 1 capsule by mouth Daily., Disp: , Rfl:     Elderberry-Vitamin C-Zinc (ELDERBERRY IMMUNE HEALTH GUMMY PO), Take 1 each by mouth Daily., Disp: , Rfl:     escitalopram (LEXAPRO) 10 MG tablet, TAKE 1 TABLET BY MOUTH DAILY, Disp: 90 tablet, Rfl: 1    estradiol (ESTRACE) 1 MG tablet, Take 1 tablet by mouth Daily., Disp: 90 tablet, Rfl: 1    glucosamine-chondroitin 500-400 MG capsule capsule, Take 1 capsule by mouth 2 (Two) Times a Day With Meals., Disp: , Rfl:     Nutritional Supplements (ADULT NUTRITIONAL SUPPLEMENT + PO), Take  by mouth. [place] Mushroom Gummy, Disp: , Rfl:     Omega-3 Fatty Acids (FISH OIL) 1000 MG capsule capsule, Take 1 capsule by mouth Daily With Breakfast., Disp: , Rfl:     vitamin C (ASCORBIC ACID) 500 MG tablet, Take 1 tablet by mouth Daily., Disp: , Rfl:     vitamin D (ERGOCALCIFEROL) 1.25 MG (58906 UT) capsule capsule, TAKE ONE CAPSULE BY MOUTH ONCE WEEKLY ON MONDAYS, Disp: 12 capsule, Rfl: 1    zolpidem (AMBIEN) 10 MG tablet, Take 1 tablet by mouth At Night As Needed for Sleep., Disp: 30 tablet, Rfl: 5    Objective   Vital Signs:   Vitals:    06/18/25 1312   BP: 110/70   BP Location: Left arm   Patient Position: Sitting   Cuff Size: Adult   Pulse: 74   Temp: 98.7 °F (37.1 °C)   TempSrc: Oral  "  SpO2: 96%  Comment: Room air   Weight: 68.8 kg (151 lb 9.6 oz)   Height: 165.1 cm (65\")       Body mass index is 25.23 kg/m².    Physical Exam  Vitals and nursing note reviewed.   Constitutional:       General: She is not in acute distress.     Appearance: Normal appearance. She is well-developed. She is not ill-appearing or diaphoretic.   HENT:      Head: Normocephalic.   Eyes:      Conjunctiva/sclera: Conjunctivae normal.      Pupils: Pupils are equal, round, and reactive to light.   Neck:      Thyroid: No thyromegaly.      Vascular: No JVD.   Cardiovascular:      Rate and Rhythm: Normal rate and regular rhythm.      Heart sounds: Normal heart sounds. No murmur heard.  Pulmonary:      Effort: Pulmonary effort is normal. No respiratory distress.      Breath sounds: Normal breath sounds. No wheezing or rhonchi.   Abdominal:      General: Bowel sounds are normal. There is no distension.      Palpations: Abdomen is soft.      Tenderness: There is no abdominal tenderness.   Musculoskeletal:         General: Swelling (non pitting BLE) and tenderness (L knee/leg chronic pain) present. Normal range of motion.      Cervical back: Normal range of motion and neck supple. No tenderness.   Lymphadenopathy:      Cervical: No cervical adenopathy.   Skin:     General: Skin is warm and dry.      Coloration: Skin is not jaundiced.      Findings: Lesion (SK of mid/right upper chest) present. No erythema or rash.   Neurological:      General: No focal deficit present.      Mental Status: She is alert and oriented to person, place, and time. Mental status is at baseline.      Sensory: No sensory deficit.      Gait: Gait abnormal (Due to chronic left knee/leg pain).   Psychiatric:         Attention and Perception: Attention normal.         Mood and Affect: Mood normal. Mood is not anxious or depressed.         Behavior: Behavior normal. Behavior is cooperative.         Thought Content: Thought content normal.         Judgment: " Judgment normal.          Result Review :     No visits with results within 7 Day(s) from this visit.   Latest known visit with results is:   Orders Only on 06/05/2025   Component Date Value Ref Range Status    Total Cholesterol 06/11/2025 243 (H)  0 - 200 mg/dL Final    Triglycerides 06/11/2025 99  0 - 150 mg/dL Final    HDL Cholesterol 06/11/2025 60  40 - 60 mg/dL Final    LDL Cholesterol  06/11/2025 166 (H)  0 - 100 mg/dL Final    VLDL Cholesterol 06/11/2025 17  5 - 40 mg/dL Final    LDL/HDL Ratio 06/11/2025 2.72   Final    Glucose 06/11/2025 86  65 - 99 mg/dL Final    BUN 06/11/2025 18.0  8.0 - 23.0 mg/dL Final    Creatinine 06/11/2025 0.73  0.57 - 1.00 mg/dL Final    Sodium 06/11/2025 141  136 - 145 mmol/L Final    Potassium 06/11/2025 4.2  3.5 - 5.2 mmol/L Final    Chloride 06/11/2025 106  98 - 107 mmol/L Final    CO2 06/11/2025 24.0  22.0 - 29.0 mmol/L Final    Calcium 06/11/2025 9.3  8.6 - 10.5 mg/dL Final    Total Protein 06/11/2025 7.1  6.0 - 8.5 g/dL Final    Albumin 06/11/2025 4.1  3.5 - 5.2 g/dL Final    ALT (SGPT) 06/11/2025 17  1 - 33 U/L Final    AST (SGOT) 06/11/2025 27  1 - 32 U/L Final    Alkaline Phosphatase 06/11/2025 57  39 - 117 U/L Final    Total Bilirubin 06/11/2025 0.3  0.0 - 1.2 mg/dL Final    Globulin 06/11/2025 3.0  gm/dL Final    A/G Ratio 06/11/2025 1.4  g/dL Final    BUN/Creatinine Ratio 06/11/2025 24.7  7.0 - 25.0 Final    Anion Gap 06/11/2025 11.0  5.0 - 15.0 mmol/L Final    eGFR 06/11/2025 92.5  >60.0 mL/min/1.73 Final    WBC 06/11/2025 5.22  3.40 - 10.80 10*3/mm3 Final    RBC 06/11/2025 3.86  3.77 - 5.28 10*6/mm3 Final    Hemoglobin 06/11/2025 12.6  12.0 - 15.9 g/dL Final    Hematocrit 06/11/2025 38.2  34.0 - 46.6 % Final    MCV 06/11/2025 99.0 (H)  79.0 - 97.0 fL Final    MCH 06/11/2025 32.6  26.6 - 33.0 pg Final    MCHC 06/11/2025 33.0  31.5 - 35.7 g/dL Final    RDW 06/11/2025 12.5  12.3 - 15.4 % Final    RDW-SD 06/11/2025 44.8  37.0 - 54.0 fl Final    MPV 06/11/2025 10.7  6.0 -  12.0 fL Final    Platelets 06/11/2025 283  140 - 450 10*3/mm3 Final    TSH 06/11/2025 3.260  0.270 - 4.200 uIU/mL Final            Cryotherapy, Skin Lesion    Date/Time: 6/18/2025 1:52 PM    Performed by: Dena Cantu APRN  Authorized by: Dena Cantu APRN  Local anesthesia used: no    Anesthesia:  Local anesthesia used: no    Sedation:  Patient sedated: no    Patient tolerance: patient tolerated the procedure well with no immediate complications  Comments: Cryotherapy to x1 SK of chest x30 seconds.            BMI is >= 25 and <30. (Overweight) The following options were offered after discussion;: exercise counseling/recommendations and nutrition counseling/recommendations           Assessment and Plan    Diagnoses and all orders for this visit:    1. Preventative health care (Primary)  Comments:  Recommend completion of Cologuard  Recommend Shingrix, Tdap and [id number] education handouts  labs reviewed and stable    2. Postmenopausal  Comments:  cont/refill estradiol  Orders:  -     estradiol (ESTRACE) 1 MG tablet; Take 1 tablet by mouth Daily.  Dispense: 90 tablet; Refill: 1    3. Insomnia, unspecified type  Comments:  labile but d/t pain, cont and refill ambien  Orders:  -     zolpidem (AMBIEN) 10 MG tablet; Take 1 tablet by mouth At Night As Needed for Sleep.  Dispense: 30 tablet; Refill: 5    4. Mixed hyperlipidemia    5. Anxiety  Comments:  Stable, continue and refill Lexapro when needed    6. Vitamin D deficiency    7. Complex regional pain syndrome type 1, affecting unspecified site  Comments:  Significant improvement now on glucosamine.  No longer taking pregabalin or tramadol    8. Bilateral lower extremity edema    9. SK (seborrheic keratosis)  -     Cryotherapy, Skin Lesion      Overall doing well  Cont current med regimen, refills as above and when needed   Age appropriate preventative counseling provided, including healthy lifestyle modifications and exercise  Mammo up to date       I  spent 30 minutes caring for Denisse Flores on this date of service. This time includes time spent by me in the following activities: preparing for the visit, reviewing tests, performing a medically appropriate examination and/or evaluation , counseling and educating the patient/family/caregiver, ordering medications, tests, or procedures and documenting information in the medical record        Follow Up     Return in about 6 months (around 12/18/2025) for Recheck. HTN panel prior to appt .  Patient was given instructions and counseling regarding her condition or for health maintenance advice. Please see specific information pulled into the AVS if appropriate.        Part of this note may be an electronic transcription/translation of spoken language to printed text using the Dragon Dictation System

## (undated) DEVICE — APPL CHLORAPREP W/TINT 26ML ORNG

## (undated) DEVICE — DRAPE,REIN 53X77,STERILE: Brand: MEDLINE

## (undated) DEVICE — BNDG ELAS ELITE V/CLOSE 6IN 5YD LF STRL

## (undated) DEVICE — CONTAINER,SPECIMEN,OR STERILE,4OZ: Brand: MEDLINE

## (undated) DEVICE — PK KN TOTL 40

## (undated) DEVICE — NDL HYPO PRECISIONGLIDE REG 25G 1 1/2

## (undated) DEVICE — STPLR SKIN VISISTAT WD 35CT

## (undated) DEVICE — NDL SPINE 20G 3 1/2 YEL STRL 1P/U

## (undated) DEVICE — SPNG GZ WOVN 4X4IN 12PLY 10/BX STRL

## (undated) DEVICE — MAT FLR ABSORBENT LG 4FT 10 2.5FT

## (undated) DEVICE — DRSNG GZ PETROLTM XEROFORM CURAD 1X8IN STRL

## (undated) DEVICE — CULT AER/ANAEROB FASTIDIOUS BACT

## (undated) DEVICE — BNDG ELAS ELITE V/CLOSE 4IN 5YD LF STRL

## (undated) DEVICE — PAD,ABDOMINAL,8"X10",ST,LF: Brand: MEDLINE

## (undated) DEVICE — SYR LUERLOK 30CC

## (undated) DEVICE — DUAL CUT SAGITTAL BLADE

## (undated) DEVICE — POOLE SUCTION HANDLE: Brand: CARDINAL HEALTH

## (undated) DEVICE — BNDG ADHS PLSTC 1X3IN LF

## (undated) DEVICE — SPNG LAP 18X18IN LF STRL PK/5

## (undated) DEVICE — UNDERCAST PADDING: Brand: DEROYAL

## (undated) DEVICE — GLV SURG BIOGEL LTX PF 7 1/2

## (undated) DEVICE — GLV SURG PREMIERPRO ORTHO LTX PF SZ7.5 BRN

## (undated) DEVICE — OCCLUSIVE GAUZE STRIP,3% BISMUTH TRIBROMOPHENATE IN PETROLATUM BLEND: Brand: XEROFORM

## (undated) DEVICE — DECANT BG O JET

## (undated) DEVICE — ANTIBACTERIAL UNDYED BRAIDED (POLYGLACTIN 910), SYNTHETIC ABSORBABLE SUTURE: Brand: COATED VICRYL

## (undated) DEVICE — PIN TROC SIGNATURE PK/2

## (undated) DEVICE — ENCORE® LATEX ORTHO SIZE 7.5, STERILE LATEX POWDER-FREE SURGICAL GLOVE: Brand: ENCORE

## (undated) DEVICE — KT DRN EVAC WND PVC PCH WTROC RND 10F400